# Patient Record
Sex: FEMALE | Race: WHITE | Employment: OTHER
[De-identification: names, ages, dates, MRNs, and addresses within clinical notes are randomized per-mention and may not be internally consistent; named-entity substitution may affect disease eponyms.]

---

## 2017-01-01 ENCOUNTER — HOME CARE VISIT (OUTPATIENT)
Dept: SCHEDULING | Facility: HOME HEALTH | Age: 82
End: 2017-01-01
Payer: MEDICARE

## 2017-01-01 ENCOUNTER — APPOINTMENT (OUTPATIENT)
Dept: GENERAL RADIOLOGY | Age: 82
DRG: 252 | End: 2017-01-01
Attending: EMERGENCY MEDICINE
Payer: MEDICARE

## 2017-01-01 ENCOUNTER — HOSPITAL ENCOUNTER (INPATIENT)
Age: 82
LOS: 6 days | Discharge: HOME | DRG: 480 | End: 2017-08-01
Attending: EMERGENCY MEDICINE | Admitting: HOSPITALIST
Payer: MEDICARE

## 2017-01-01 ENCOUNTER — HOME CARE VISIT (OUTPATIENT)
Dept: HOSPICE | Facility: HOSPICE | Age: 82
End: 2017-01-01
Payer: MEDICARE

## 2017-01-01 ENCOUNTER — APPOINTMENT (OUTPATIENT)
Dept: GENERAL RADIOLOGY | Age: 82
DRG: 480 | End: 2017-01-01
Attending: ORTHOPAEDIC SURGERY
Payer: MEDICARE

## 2017-01-01 ENCOUNTER — APPOINTMENT (OUTPATIENT)
Dept: CT IMAGING | Age: 82
DRG: 252 | End: 2017-01-01
Attending: INTERNAL MEDICINE
Payer: MEDICARE

## 2017-01-01 ENCOUNTER — HOSPITAL ENCOUNTER (INPATIENT)
Age: 82
LOS: 3 days | Discharge: NURSING, REHAB OR LONG-TERM CARE | DRG: 252 | End: 2017-04-21
Attending: EMERGENCY MEDICINE | Admitting: INTERNAL MEDICINE
Payer: MEDICARE

## 2017-01-01 ENCOUNTER — APPOINTMENT (OUTPATIENT)
Dept: GENERAL RADIOLOGY | Age: 82
DRG: 480 | End: 2017-01-01
Attending: PHYSICIAN ASSISTANT
Payer: MEDICARE

## 2017-01-01 ENCOUNTER — APPOINTMENT (OUTPATIENT)
Dept: GENERAL RADIOLOGY | Age: 82
DRG: 480 | End: 2017-01-01
Attending: EMERGENCY MEDICINE
Payer: MEDICARE

## 2017-01-01 ENCOUNTER — HOSPICE ADMISSION (OUTPATIENT)
Dept: HOSPICE | Facility: HOSPICE | Age: 82
End: 2017-01-01
Payer: MEDICARE

## 2017-01-01 ENCOUNTER — APPOINTMENT (OUTPATIENT)
Dept: INTERVENTIONAL RADIOLOGY/VASCULAR | Age: 82
DRG: 252 | End: 2017-01-01
Attending: HOSPITALIST
Payer: MEDICARE

## 2017-01-01 ENCOUNTER — APPOINTMENT (OUTPATIENT)
Dept: GENERAL RADIOLOGY | Age: 82
DRG: 252 | End: 2017-01-01
Attending: ORTHOPAEDIC SURGERY
Payer: MEDICARE

## 2017-01-01 ENCOUNTER — APPOINTMENT (OUTPATIENT)
Dept: CT IMAGING | Age: 82
DRG: 480 | End: 2017-01-01
Attending: PHYSICIAN ASSISTANT
Payer: MEDICARE

## 2017-01-01 ENCOUNTER — APPOINTMENT (OUTPATIENT)
Dept: CT IMAGING | Age: 82
End: 2017-01-01
Attending: EMERGENCY MEDICINE
Payer: MEDICARE

## 2017-01-01 ENCOUNTER — HOSPITAL ENCOUNTER (OUTPATIENT)
Age: 82
Setting detail: OBSERVATION
Discharge: HOME | End: 2017-01-20
Attending: EMERGENCY MEDICINE | Admitting: HOSPITALIST
Payer: MEDICARE

## 2017-01-01 ENCOUNTER — ANESTHESIA EVENT (OUTPATIENT)
Dept: SURGERY | Age: 82
DRG: 480 | End: 2017-01-01
Payer: MEDICARE

## 2017-01-01 ENCOUNTER — ANESTHESIA (OUTPATIENT)
Dept: SURGERY | Age: 82
DRG: 480 | End: 2017-01-01
Payer: MEDICARE

## 2017-01-01 VITALS — RESPIRATION RATE: 16 BRPM | DIASTOLIC BLOOD PRESSURE: 60 MMHG | SYSTOLIC BLOOD PRESSURE: 110 MMHG | HEART RATE: 76 BPM

## 2017-01-01 VITALS
SYSTOLIC BLOOD PRESSURE: 110 MMHG | HEART RATE: 86 BPM | DIASTOLIC BLOOD PRESSURE: 60 MMHG | OXYGEN SATURATION: 96 % | RESPIRATION RATE: 18 BRPM

## 2017-01-01 VITALS
DIASTOLIC BLOOD PRESSURE: 55 MMHG | TEMPERATURE: 98.2 F | RESPIRATION RATE: 20 BRPM | BODY MASS INDEX: 20.39 KG/M2 | OXYGEN SATURATION: 99 % | HEIGHT: 60 IN | WEIGHT: 103.84 LBS | SYSTOLIC BLOOD PRESSURE: 143 MMHG | HEART RATE: 99 BPM

## 2017-01-01 VITALS
OXYGEN SATURATION: 97 % | BODY MASS INDEX: 15.36 KG/M2 | RESPIRATION RATE: 18 BRPM | SYSTOLIC BLOOD PRESSURE: 90 MMHG | HEIGHT: 64 IN | HEART RATE: 88 BPM | DIASTOLIC BLOOD PRESSURE: 62 MMHG | WEIGHT: 90 LBS | TEMPERATURE: 98.9 F

## 2017-01-01 VITALS
OXYGEN SATURATION: 98 % | DIASTOLIC BLOOD PRESSURE: 80 MMHG | TEMPERATURE: 98 F | HEART RATE: 71 BPM | SYSTOLIC BLOOD PRESSURE: 148 MMHG | WEIGHT: 101 LBS | RESPIRATION RATE: 18 BRPM | BODY MASS INDEX: 19.83 KG/M2 | HEIGHT: 60 IN

## 2017-01-01 VITALS
RESPIRATION RATE: 16 BRPM | DIASTOLIC BLOOD PRESSURE: 66 MMHG | HEART RATE: 83 BPM | TEMPERATURE: 97.7 F | SYSTOLIC BLOOD PRESSURE: 117 MMHG

## 2017-01-01 VITALS
DIASTOLIC BLOOD PRESSURE: 58 MMHG | TEMPERATURE: 97.2 F | RESPIRATION RATE: 18 BRPM | SYSTOLIC BLOOD PRESSURE: 91 MMHG | HEART RATE: 106 BPM

## 2017-01-01 VITALS
RESPIRATION RATE: 17 BRPM | HEIGHT: 64 IN | BODY MASS INDEX: 15.36 KG/M2 | TEMPERATURE: 98.7 F | WEIGHT: 90 LBS | OXYGEN SATURATION: 98 % | SYSTOLIC BLOOD PRESSURE: 121 MMHG | HEART RATE: 87 BPM | DIASTOLIC BLOOD PRESSURE: 62 MMHG

## 2017-01-01 VITALS
SYSTOLIC BLOOD PRESSURE: 122 MMHG | OXYGEN SATURATION: 98 % | HEART RATE: 72 BPM | DIASTOLIC BLOOD PRESSURE: 64 MMHG | RESPIRATION RATE: 16 BRPM

## 2017-01-01 VITALS — RESPIRATION RATE: 16 BRPM | SYSTOLIC BLOOD PRESSURE: 106 MMHG | DIASTOLIC BLOOD PRESSURE: 60 MMHG | HEART RATE: 78 BPM

## 2017-01-01 VITALS
HEART RATE: 76 BPM | OXYGEN SATURATION: 99 % | RESPIRATION RATE: 18 BRPM | SYSTOLIC BLOOD PRESSURE: 102 MMHG | DIASTOLIC BLOOD PRESSURE: 64 MMHG

## 2017-01-01 VITALS
SYSTOLIC BLOOD PRESSURE: 120 MMHG | DIASTOLIC BLOOD PRESSURE: 80 MMHG | OXYGEN SATURATION: 97 % | HEART RATE: 79 BPM | RESPIRATION RATE: 18 BRPM

## 2017-01-01 VITALS — RESPIRATION RATE: 16 BRPM | SYSTOLIC BLOOD PRESSURE: 120 MMHG | HEART RATE: 78 BPM | DIASTOLIC BLOOD PRESSURE: 70 MMHG

## 2017-01-01 VITALS
RESPIRATION RATE: 16 BRPM | OXYGEN SATURATION: 97 % | SYSTOLIC BLOOD PRESSURE: 140 MMHG | HEART RATE: 86 BPM | DIASTOLIC BLOOD PRESSURE: 80 MMHG

## 2017-01-01 VITALS — SYSTOLIC BLOOD PRESSURE: 120 MMHG | HEART RATE: 76 BPM | RESPIRATION RATE: 16 BRPM | DIASTOLIC BLOOD PRESSURE: 80 MMHG

## 2017-01-01 VITALS — HEART RATE: 74 BPM | SYSTOLIC BLOOD PRESSURE: 80 MMHG | RESPIRATION RATE: 16 BRPM | DIASTOLIC BLOOD PRESSURE: 50 MMHG

## 2017-01-01 VITALS
HEART RATE: 70 BPM | DIASTOLIC BLOOD PRESSURE: 61 MMHG | TEMPERATURE: 97.3 F | SYSTOLIC BLOOD PRESSURE: 93 MMHG | RESPIRATION RATE: 18 BRPM

## 2017-01-01 VITALS — DIASTOLIC BLOOD PRESSURE: 68 MMHG | HEART RATE: 76 BPM | SYSTOLIC BLOOD PRESSURE: 106 MMHG | RESPIRATION RATE: 16 BRPM

## 2017-01-01 VITALS — RESPIRATION RATE: 18 BRPM | DIASTOLIC BLOOD PRESSURE: 70 MMHG | SYSTOLIC BLOOD PRESSURE: 120 MMHG | HEART RATE: 78 BPM

## 2017-01-01 VITALS — DIASTOLIC BLOOD PRESSURE: 60 MMHG | SYSTOLIC BLOOD PRESSURE: 110 MMHG | HEART RATE: 74 BPM | RESPIRATION RATE: 18 BRPM

## 2017-01-01 VITALS
DIASTOLIC BLOOD PRESSURE: 78 MMHG | SYSTOLIC BLOOD PRESSURE: 120 MMHG | OXYGEN SATURATION: 98 % | RESPIRATION RATE: 18 BRPM | HEART RATE: 64 BPM

## 2017-01-01 VITALS — HEART RATE: 86 BPM | SYSTOLIC BLOOD PRESSURE: 120 MMHG | DIASTOLIC BLOOD PRESSURE: 80 MMHG | RESPIRATION RATE: 16 BRPM

## 2017-01-01 DIAGNOSIS — Z71.89 GOALS OF CARE, COUNSELING/DISCUSSION: ICD-10-CM

## 2017-01-01 DIAGNOSIS — F02.80 ALZHEIMER'S DEMENTIA WITHOUT BEHAVIORAL DISTURBANCE, UNSPECIFIED TIMING OF DEMENTIA ONSET: ICD-10-CM

## 2017-01-01 DIAGNOSIS — E86.0 DEHYDRATION: Primary | ICD-10-CM

## 2017-01-01 DIAGNOSIS — R55 SYNCOPE AND COLLAPSE: ICD-10-CM

## 2017-01-01 DIAGNOSIS — R53.81 DEBILITY: ICD-10-CM

## 2017-01-01 DIAGNOSIS — Z71.89 DNR (DO NOT RESUSCITATE) DISCUSSION: ICD-10-CM

## 2017-01-01 DIAGNOSIS — R19.7 DIARRHEA, UNSPECIFIED TYPE: ICD-10-CM

## 2017-01-01 DIAGNOSIS — G30.9 ALZHEIMER'S DEMENTIA WITHOUT BEHAVIORAL DISTURBANCE, UNSPECIFIED TIMING OF DEMENTIA ONSET: ICD-10-CM

## 2017-01-01 DIAGNOSIS — R53.1 WEAKNESS: ICD-10-CM

## 2017-01-01 DIAGNOSIS — S72.332A CLOSED DISPLACED OBLIQUE FRACTURE OF SHAFT OF LEFT FEMUR, INITIAL ENCOUNTER (HCC): Primary | ICD-10-CM

## 2017-01-01 DIAGNOSIS — S72.092A OTHER CLOSED FRACTURE OF HEAD OR NECK OF LEFT FEMUR, INITIAL ENCOUNTER (HCC): Primary | ICD-10-CM

## 2017-01-01 DIAGNOSIS — R11.10 VOMITING, INTRACTABILITY OF VOMITING NOT SPECIFIED, PRESENCE OF NAUSEA NOT SPECIFIED, UNSPECIFIED VOMITING TYPE: ICD-10-CM

## 2017-01-01 DIAGNOSIS — M79.605 LEFT LEG PAIN: ICD-10-CM

## 2017-01-01 DIAGNOSIS — N39.0 URINARY TRACT INFECTION WITHOUT HEMATURIA, SITE UNSPECIFIED: ICD-10-CM

## 2017-01-01 LAB
ABO + RH BLD: NORMAL
ALBUMIN SERPL BCP-MCNC: 1.9 G/DL (ref 3.5–5)
ALBUMIN SERPL BCP-MCNC: 1.9 G/DL (ref 3.5–5)
ALBUMIN SERPL BCP-MCNC: 2.3 G/DL (ref 3.5–5)
ALBUMIN SERPL BCP-MCNC: 3.2 G/DL (ref 3.5–5)
ALBUMIN SERPL BCP-MCNC: 4 G/DL (ref 3.5–5)
ALBUMIN/GLOB SERPL: 0.6 {RATIO} (ref 1.1–2.2)
ALBUMIN/GLOB SERPL: 0.6 {RATIO} (ref 1.1–2.2)
ALBUMIN/GLOB SERPL: 0.7 {RATIO} (ref 1.1–2.2)
ALBUMIN/GLOB SERPL: 0.8 {RATIO} (ref 1.1–2.2)
ALBUMIN/GLOB SERPL: 1.1 {RATIO} (ref 1.1–2.2)
ALP SERPL-CCNC: 111 U/L (ref 45–117)
ALP SERPL-CCNC: 136 U/L (ref 45–117)
ALP SERPL-CCNC: 172 U/L (ref 45–117)
ALP SERPL-CCNC: 189 U/L (ref 45–117)
ALP SERPL-CCNC: 93 U/L (ref 45–117)
ALT SERPL-CCNC: 24 U/L (ref 12–78)
ALT SERPL-CCNC: 25 U/L (ref 12–78)
ALT SERPL-CCNC: 26 U/L (ref 12–78)
ALT SERPL-CCNC: 30 U/L (ref 12–78)
ALT SERPL-CCNC: 37 U/L (ref 12–78)
AMORPH CRY URNS QL MICRO: ABNORMAL
AMORPH CRY URNS QL MICRO: ABNORMAL
ANION GAP BLD CALC-SCNC: 10 MMOL/L (ref 5–15)
ANION GAP BLD CALC-SCNC: 18 MMOL/L (ref 5–15)
ANION GAP BLD CALC-SCNC: 2 MMOL/L (ref 5–15)
ANION GAP BLD CALC-SCNC: 4 MMOL/L (ref 5–15)
ANION GAP BLD CALC-SCNC: 5 MMOL/L (ref 5–15)
ANION GAP BLD CALC-SCNC: 6 MMOL/L (ref 5–15)
ANION GAP BLD CALC-SCNC: 6 MMOL/L (ref 5–15)
ANION GAP BLD CALC-SCNC: 7 MMOL/L (ref 5–15)
ANION GAP BLD CALC-SCNC: 8 MMOL/L (ref 5–15)
ANION GAP BLD CALC-SCNC: 8 MMOL/L (ref 5–15)
APPEARANCE UR: ABNORMAL
AST SERPL W P-5'-P-CCNC: 24 U/L (ref 15–37)
AST SERPL W P-5'-P-CCNC: 24 U/L (ref 15–37)
AST SERPL W P-5'-P-CCNC: 26 U/L (ref 15–37)
AST SERPL W P-5'-P-CCNC: 29 U/L (ref 15–37)
AST SERPL W P-5'-P-CCNC: 51 U/L (ref 15–37)
ATRIAL RATE: 91 BPM
BACTERIA SPEC CULT: ABNORMAL
BACTERIA SPEC CULT: NORMAL
BACTERIA URNS QL MICRO: ABNORMAL /HPF
BACTERIA URNS QL MICRO: ABNORMAL /HPF
BACTERIA URNS QL MICRO: NEGATIVE /HPF
BASOPHILS # BLD AUTO: 0 K/UL (ref 0–0.1)
BASOPHILS # BLD: 0 % (ref 0–1)
BILIRUB SERPL-MCNC: 0.5 MG/DL (ref 0.2–1)
BILIRUB SERPL-MCNC: 0.6 MG/DL (ref 0.2–1)
BILIRUB SERPL-MCNC: 0.6 MG/DL (ref 0.2–1)
BILIRUB SERPL-MCNC: 0.7 MG/DL (ref 0.2–1)
BILIRUB SERPL-MCNC: 0.7 MG/DL (ref 0.2–1)
BILIRUB UR QL CFM: NEGATIVE
BILIRUB UR QL CFM: NEGATIVE
BILIRUB UR QL: NEGATIVE
BLD PROD TYP BPU: NORMAL
BLD PROD TYP BPU: NORMAL
BLOOD GROUP ANTIBODIES SERPL: NORMAL
BPU ID: NORMAL
BPU ID: NORMAL
BUN BLD-MCNC: 48 MG/DL (ref 9–20)
BUN SERPL-MCNC: 14 MG/DL (ref 6–20)
BUN SERPL-MCNC: 17 MG/DL (ref 6–20)
BUN SERPL-MCNC: 17 MG/DL (ref 6–20)
BUN SERPL-MCNC: 23 MG/DL (ref 6–20)
BUN SERPL-MCNC: 25 MG/DL (ref 6–20)
BUN SERPL-MCNC: 29 MG/DL (ref 6–20)
BUN SERPL-MCNC: 36 MG/DL (ref 6–20)
BUN SERPL-MCNC: 41 MG/DL (ref 6–20)
BUN SERPL-MCNC: 41 MG/DL (ref 6–20)
BUN SERPL-MCNC: 46 MG/DL (ref 6–20)
BUN SERPL-MCNC: 59 MG/DL (ref 6–20)
BUN SERPL-MCNC: 9 MG/DL (ref 6–20)
BUN/CREAT SERPL: 20 (ref 12–20)
BUN/CREAT SERPL: 36 (ref 12–20)
BUN/CREAT SERPL: 41 (ref 12–20)
BUN/CREAT SERPL: 44 (ref 12–20)
BUN/CREAT SERPL: 44 (ref 12–20)
BUN/CREAT SERPL: 47 (ref 12–20)
BUN/CREAT SERPL: 62 (ref 12–20)
BUN/CREAT SERPL: 67 (ref 12–20)
BUN/CREAT SERPL: 68 (ref 12–20)
BUN/CREAT SERPL: 81 (ref 12–20)
BUN/CREAT SERPL: 85 (ref 12–20)
BUN/CREAT SERPL: 98 (ref 12–20)
CA-I BLD-MCNC: 1.16 MMOL/L (ref 1.12–1.32)
CALCIUM SERPL-MCNC: 7.7 MG/DL (ref 8.5–10.1)
CALCIUM SERPL-MCNC: 7.8 MG/DL (ref 8.5–10.1)
CALCIUM SERPL-MCNC: 7.9 MG/DL (ref 8.5–10.1)
CALCIUM SERPL-MCNC: 8 MG/DL (ref 8.5–10.1)
CALCIUM SERPL-MCNC: 8.1 MG/DL (ref 8.5–10.1)
CALCIUM SERPL-MCNC: 8.3 MG/DL (ref 8.5–10.1)
CALCIUM SERPL-MCNC: 8.6 MG/DL (ref 8.5–10.1)
CALCIUM SERPL-MCNC: 8.8 MG/DL (ref 8.5–10.1)
CALCIUM SERPL-MCNC: 9.5 MG/DL (ref 8.5–10.1)
CALCULATED P AXIS, ECG09: -6 DEGREES
CALCULATED R AXIS, ECG10: -13 DEGREES
CALCULATED T AXIS, ECG11: 14 DEGREES
CC UR VC: ABNORMAL
CC UR VC: NORMAL
CHLORIDE BLD-SCNC: 113 MMOL/L (ref 98–107)
CHLORIDE SERPL-SCNC: 100 MMOL/L (ref 97–108)
CHLORIDE SERPL-SCNC: 106 MMOL/L (ref 97–108)
CHLORIDE SERPL-SCNC: 108 MMOL/L (ref 97–108)
CHLORIDE SERPL-SCNC: 108 MMOL/L (ref 97–108)
CHLORIDE SERPL-SCNC: 110 MMOL/L (ref 97–108)
CHLORIDE SERPL-SCNC: 111 MMOL/L (ref 97–108)
CHLORIDE SERPL-SCNC: 111 MMOL/L (ref 97–108)
CHLORIDE SERPL-SCNC: 112 MMOL/L (ref 97–108)
CHLORIDE SERPL-SCNC: 114 MMOL/L (ref 97–108)
CHLORIDE SERPL-SCNC: 119 MMOL/L (ref 97–108)
CHLORIDE SERPL-SCNC: 119 MMOL/L (ref 97–108)
CHLORIDE SERPL-SCNC: 121 MMOL/L (ref 97–108)
CK MB CFR SERPL CALC: 1.6 % (ref 0–2.5)
CK MB CFR SERPL CALC: 1.9 % (ref 0–2.5)
CK MB SERPL-MCNC: 4 NG/ML (ref 5–25)
CK MB SERPL-MCNC: 6.4 NG/ML (ref 5–25)
CK SERPL-CCNC: 211 U/L (ref 26–192)
CK SERPL-CCNC: 392 U/L (ref 26–192)
CO2 BLD-SCNC: 25 MMOL/L (ref 21–32)
CO2 SERPL-SCNC: 26 MMOL/L (ref 21–32)
CO2 SERPL-SCNC: 27 MMOL/L (ref 21–32)
CO2 SERPL-SCNC: 27 MMOL/L (ref 21–32)
CO2 SERPL-SCNC: 28 MMOL/L (ref 21–32)
CO2 SERPL-SCNC: 30 MMOL/L (ref 21–32)
CO2 SERPL-SCNC: 32 MMOL/L (ref 21–32)
CO2 SERPL-SCNC: 35 MMOL/L (ref 21–32)
COLOR UR: ABNORMAL
CREAT BLD-MCNC: 0.7 MG/DL (ref 0.6–1.3)
CREAT SERPL-MCNC: 0.32 MG/DL (ref 0.55–1.02)
CREAT SERPL-MCNC: 0.36 MG/DL (ref 0.55–1.02)
CREAT SERPL-MCNC: 0.37 MG/DL (ref 0.55–1.02)
CREAT SERPL-MCNC: 0.43 MG/DL (ref 0.55–1.02)
CREAT SERPL-MCNC: 0.44 MG/DL (ref 0.55–1.02)
CREAT SERPL-MCNC: 0.47 MG/DL (ref 0.55–1.02)
CREAT SERPL-MCNC: 0.47 MG/DL (ref 0.55–1.02)
CREAT SERPL-MCNC: 0.48 MG/DL (ref 0.55–1.02)
CREAT SERPL-MCNC: 0.52 MG/DL (ref 0.55–1.02)
CREAT SERPL-MCNC: 0.58 MG/DL (ref 0.55–1.02)
CREAT SERPL-MCNC: 0.73 MG/DL (ref 0.55–1.02)
CREAT SERPL-MCNC: 0.99 MG/DL (ref 0.55–1.02)
CROSSMATCH RESULT,%XM: NORMAL
CROSSMATCH RESULT,%XM: NORMAL
DIAGNOSIS, 93000: NORMAL
DIFFERENTIAL METHOD BLD: ABNORMAL
EOSINOPHIL # BLD: 0 K/UL (ref 0–0.4)
EOSINOPHIL # BLD: 0.1 K/UL (ref 0–0.4)
EOSINOPHIL NFR BLD: 0 % (ref 0–7)
EOSINOPHIL NFR BLD: 1 % (ref 0–7)
EOSINOPHIL NFR BLD: 2 % (ref 0–7)
EOSINOPHIL NFR BLD: 2 % (ref 0–7)
EPITH CASTS URNS QL MICRO: ABNORMAL /LPF
ERYTHROCYTE [DISTWIDTH] IN BLOOD BY AUTOMATED COUNT: 14.8 % (ref 11.5–14.5)
ERYTHROCYTE [DISTWIDTH] IN BLOOD BY AUTOMATED COUNT: 14.9 % (ref 11.5–14.5)
ERYTHROCYTE [DISTWIDTH] IN BLOOD BY AUTOMATED COUNT: 15.9 % (ref 11.5–14.5)
ERYTHROCYTE [DISTWIDTH] IN BLOOD BY AUTOMATED COUNT: 16 % (ref 11.5–14.5)
ERYTHROCYTE [DISTWIDTH] IN BLOOD BY AUTOMATED COUNT: 16.5 % (ref 11.5–14.5)
ERYTHROCYTE [DISTWIDTH] IN BLOOD BY AUTOMATED COUNT: 16.5 % (ref 11.5–14.5)
ERYTHROCYTE [DISTWIDTH] IN BLOOD BY AUTOMATED COUNT: 17.5 % (ref 11.5–14.5)
ERYTHROCYTE [DISTWIDTH] IN BLOOD BY AUTOMATED COUNT: 17.6 % (ref 11.5–14.5)
ERYTHROCYTE [DISTWIDTH] IN BLOOD BY AUTOMATED COUNT: 18.5 % (ref 11.5–14.5)
ERYTHROCYTE [DISTWIDTH] IN BLOOD BY AUTOMATED COUNT: 19.6 % (ref 11.5–14.5)
FERRITIN SERPL-MCNC: 178 NG/ML (ref 8–252)
FOLATE SERPL-MCNC: 11.8 NG/ML (ref 5–21)
GLOBULIN SER CALC-MCNC: 2.7 G/DL (ref 2–4)
GLOBULIN SER CALC-MCNC: 3.3 G/DL (ref 2–4)
GLOBULIN SER CALC-MCNC: 3.8 G/DL (ref 2–4)
GLOBULIN SER CALC-MCNC: 3.9 G/DL (ref 2–4)
GLOBULIN SER CALC-MCNC: 4.1 G/DL (ref 2–4)
GLUCOSE BLD-MCNC: 121 MG/DL (ref 65–100)
GLUCOSE SERPL-MCNC: 104 MG/DL (ref 65–100)
GLUCOSE SERPL-MCNC: 117 MG/DL (ref 65–100)
GLUCOSE SERPL-MCNC: 123 MG/DL (ref 65–100)
GLUCOSE SERPL-MCNC: 125 MG/DL (ref 65–100)
GLUCOSE SERPL-MCNC: 144 MG/DL (ref 65–100)
GLUCOSE SERPL-MCNC: 74 MG/DL (ref 65–100)
GLUCOSE SERPL-MCNC: 82 MG/DL (ref 65–100)
GLUCOSE SERPL-MCNC: 88 MG/DL (ref 65–100)
GLUCOSE SERPL-MCNC: 88 MG/DL (ref 65–100)
GLUCOSE SERPL-MCNC: 91 MG/DL (ref 65–100)
GLUCOSE SERPL-MCNC: 95 MG/DL (ref 65–100)
GLUCOSE SERPL-MCNC: 96 MG/DL (ref 65–100)
GLUCOSE UR STRIP.AUTO-MCNC: NEGATIVE MG/DL
GRAN CASTS URNS QL MICRO: ABNORMAL /LPF
HCT VFR BLD AUTO: 21.5 % (ref 35–47)
HCT VFR BLD AUTO: 21.7 % (ref 35–47)
HCT VFR BLD AUTO: 28.8 % (ref 35–47)
HCT VFR BLD AUTO: 28.9 % (ref 35–47)
HCT VFR BLD AUTO: 29.9 % (ref 35–47)
HCT VFR BLD AUTO: 30.8 % (ref 35–47)
HCT VFR BLD AUTO: 32.8 % (ref 35–47)
HCT VFR BLD AUTO: 34.4 % (ref 35–47)
HCT VFR BLD AUTO: 39.3 % (ref 35–47)
HCT VFR BLD AUTO: 41.5 % (ref 35–47)
HCT VFR BLD AUTO: 43.9 % (ref 35–47)
HCT VFR BLD CALC: 23 % (ref 35–47)
HEMOCCULT STL QL: NEGATIVE
HGB BLD-MCNC: 10.5 G/DL (ref 11.5–16)
HGB BLD-MCNC: 11.1 G/DL (ref 11.5–16)
HGB BLD-MCNC: 12.3 G/DL (ref 11.5–16)
HGB BLD-MCNC: 13.3 G/DL (ref 11.5–16)
HGB BLD-MCNC: 14.3 G/DL (ref 11.5–16)
HGB BLD-MCNC: 6.6 G/DL (ref 11.5–16)
HGB BLD-MCNC: 6.7 G/DL (ref 11.5–16)
HGB BLD-MCNC: 7.8 GM/DL (ref 11.5–16)
HGB BLD-MCNC: 9 G/DL (ref 11.5–16)
HGB BLD-MCNC: 9.5 G/DL (ref 11.5–16)
HGB BLD-MCNC: 9.6 G/DL (ref 11.5–16)
HGB BLD-MCNC: 9.7 G/DL (ref 11.5–16)
HGB BLD-MCNC: 9.9 G/DL (ref 11.5–16)
HGB UR QL STRIP: ABNORMAL
HGB UR QL STRIP: ABNORMAL
HGB UR QL STRIP: NEGATIVE
HYALINE CASTS URNS QL MICRO: ABNORMAL /LPF (ref 0–5)
INR PPP: 1 (ref 0.9–1.1)
IRON SATN MFR SERPL: 17 % (ref 20–50)
IRON SERPL-MCNC: 34 UG/DL (ref 35–150)
IRON SERPL-MCNC: 35 UG/DL (ref 35–150)
KETONES UR QL STRIP.AUTO: ABNORMAL MG/DL
KETONES UR QL STRIP.AUTO: NEGATIVE MG/DL
KETONES UR QL STRIP.AUTO: NEGATIVE MG/DL
LEUKOCYTE ESTERASE UR QL STRIP.AUTO: ABNORMAL
LEUKOCYTE ESTERASE UR QL STRIP.AUTO: NEGATIVE
LEUKOCYTE ESTERASE UR QL STRIP.AUTO: NEGATIVE
LIPASE SERPL-CCNC: 184 U/L (ref 73–393)
LYMPHOCYTES # BLD AUTO: 1 % (ref 12–49)
LYMPHOCYTES # BLD AUTO: 11 % (ref 12–49)
LYMPHOCYTES # BLD AUTO: 17 % (ref 12–49)
LYMPHOCYTES # BLD AUTO: 20 % (ref 12–49)
LYMPHOCYTES # BLD AUTO: 25 % (ref 12–49)
LYMPHOCYTES # BLD AUTO: 33 % (ref 12–49)
LYMPHOCYTES # BLD: 0.1 K/UL (ref 0.8–3.5)
LYMPHOCYTES # BLD: 1.1 K/UL (ref 0.8–3.5)
LYMPHOCYTES # BLD: 1.3 K/UL (ref 0.8–3.5)
LYMPHOCYTES # BLD: 1.5 K/UL (ref 0.8–3.5)
LYMPHOCYTES # BLD: 1.6 K/UL (ref 0.8–3.5)
LYMPHOCYTES # BLD: 2 K/UL (ref 0.8–3.5)
LYMPHOCYTES # BLD: 2.2 K/UL (ref 0.8–3.5)
LYMPHOCYTES # BLD: 2.9 K/UL (ref 0.8–3.5)
MAGNESIUM SERPL-MCNC: 2.2 MG/DL (ref 1.6–2.4)
MAGNESIUM SERPL-MCNC: 2.6 MG/DL (ref 1.6–2.4)
MCH RBC QN AUTO: 29.2 PG (ref 26–34)
MCH RBC QN AUTO: 29.5 PG (ref 26–34)
MCH RBC QN AUTO: 29.7 PG (ref 26–34)
MCH RBC QN AUTO: 30.3 PG (ref 26–34)
MCH RBC QN AUTO: 30.5 PG (ref 26–34)
MCH RBC QN AUTO: 30.7 PG (ref 26–34)
MCH RBC QN AUTO: 30.8 PG (ref 26–34)
MCH RBC QN AUTO: 30.9 PG (ref 26–34)
MCH RBC QN AUTO: 31.2 PG (ref 26–34)
MCH RBC QN AUTO: 31.2 PG (ref 26–34)
MCHC RBC AUTO-ENTMCNC: 31.2 G/DL (ref 30–36.5)
MCHC RBC AUTO-ENTMCNC: 31.3 G/DL (ref 30–36.5)
MCHC RBC AUTO-ENTMCNC: 31.3 G/DL (ref 30–36.5)
MCHC RBC AUTO-ENTMCNC: 31.5 G/DL (ref 30–36.5)
MCHC RBC AUTO-ENTMCNC: 31.8 G/DL (ref 30–36.5)
MCHC RBC AUTO-ENTMCNC: 32 G/DL (ref 30–36.5)
MCHC RBC AUTO-ENTMCNC: 32 G/DL (ref 30–36.5)
MCHC RBC AUTO-ENTMCNC: 32.3 G/DL (ref 30–36.5)
MCHC RBC AUTO-ENTMCNC: 32.6 G/DL (ref 30–36.5)
MCHC RBC AUTO-ENTMCNC: 33.2 G/DL (ref 30–36.5)
MCV RBC AUTO: 92.6 FL (ref 80–99)
MCV RBC AUTO: 92.8 FL (ref 80–99)
MCV RBC AUTO: 92.9 FL (ref 80–99)
MCV RBC AUTO: 94.8 FL (ref 80–99)
MCV RBC AUTO: 94.8 FL (ref 80–99)
MCV RBC AUTO: 94.9 FL (ref 80–99)
MCV RBC AUTO: 96.6 FL (ref 80–99)
MCV RBC AUTO: 97.4 FL (ref 80–99)
MCV RBC AUTO: 97.7 FL (ref 80–99)
MCV RBC AUTO: 98.3 FL (ref 80–99)
MONOCYTES # BLD: 0.1 K/UL (ref 0–1)
MONOCYTES # BLD: 0.4 K/UL (ref 0–1)
MONOCYTES # BLD: 0.5 K/UL (ref 0–1)
MONOCYTES # BLD: 0.5 K/UL (ref 0–1)
MONOCYTES # BLD: 0.6 K/UL (ref 0–1)
MONOCYTES # BLD: 0.6 K/UL (ref 0–1)
MONOCYTES # BLD: 0.7 K/UL (ref 0–1)
MONOCYTES # BLD: 0.7 K/UL (ref 0–1)
MONOCYTES NFR BLD AUTO: 1 % (ref 5–13)
MONOCYTES NFR BLD AUTO: 5 % (ref 5–13)
MONOCYTES NFR BLD AUTO: 6 % (ref 5–13)
MONOCYTES NFR BLD AUTO: 8 % (ref 5–13)
MONOCYTES NFR BLD AUTO: 9 % (ref 5–13)
MUCOUS THREADS URNS QL MICRO: ABNORMAL /LPF
NEUTS BAND NFR BLD MANUAL: 9 % (ref 0–6)
NEUTS SEG # BLD: 12.5 K/UL (ref 1.8–8)
NEUTS SEG # BLD: 4 K/UL (ref 1.8–8)
NEUTS SEG # BLD: 5.4 K/UL (ref 1.8–8)
NEUTS SEG # BLD: 5.8 K/UL (ref 1.8–8)
NEUTS SEG # BLD: 5.9 K/UL (ref 1.8–8)
NEUTS SEG # BLD: 6.8 K/UL (ref 1.8–8)
NEUTS SEG # BLD: 7.6 K/UL (ref 1.8–8)
NEUTS SEG # BLD: 9.4 K/UL (ref 1.8–8)
NEUTS SEG NFR BLD AUTO: 61 % (ref 32–75)
NEUTS SEG NFR BLD AUTO: 67 % (ref 32–75)
NEUTS SEG NFR BLD AUTO: 69 % (ref 32–75)
NEUTS SEG NFR BLD AUTO: 71 % (ref 32–75)
NEUTS SEG NFR BLD AUTO: 73 % (ref 32–75)
NEUTS SEG NFR BLD AUTO: 77 % (ref 32–75)
NEUTS SEG NFR BLD AUTO: 83 % (ref 32–75)
NEUTS SEG NFR BLD AUTO: 89 % (ref 32–75)
NITRITE UR QL STRIP.AUTO: NEGATIVE
NITRITE UR QL STRIP.AUTO: NEGATIVE
NITRITE UR QL STRIP.AUTO: POSITIVE
NRBC # BLD: 0 K/UL (ref 0–0.01)
NRBC BLD-RTO: 0 PER 100 WBC
P-R INTERVAL, ECG05: 162 MS
PH UR STRIP: 5 [PH] (ref 5–8)
PH UR STRIP: 5 [PH] (ref 5–8)
PH UR STRIP: 5.5 [PH] (ref 5–8)
PHOSPHATE SERPL-MCNC: 2.4 MG/DL (ref 2.6–4.7)
PHOSPHATE SERPL-MCNC: 2.9 MG/DL (ref 2.6–4.7)
PLATELET # BLD AUTO: 143 K/UL (ref 150–400)
PLATELET # BLD AUTO: 155 K/UL (ref 150–400)
PLATELET # BLD AUTO: 170 K/UL (ref 150–400)
PLATELET # BLD AUTO: 187 K/UL (ref 150–400)
PLATELET # BLD AUTO: 207 K/UL (ref 150–400)
PLATELET # BLD AUTO: 244 K/UL (ref 150–400)
PLATELET # BLD AUTO: 419 K/UL (ref 150–400)
PLATELET # BLD AUTO: 426 K/UL (ref 150–400)
PLATELET # BLD AUTO: 438 K/UL (ref 150–400)
PLATELET # BLD AUTO: 545 K/UL (ref 150–400)
POTASSIUM BLD-SCNC: 3.9 MMOL/L (ref 3.5–5.1)
POTASSIUM SERPL-SCNC: 2.9 MMOL/L (ref 3.5–5.1)
POTASSIUM SERPL-SCNC: 3.1 MMOL/L (ref 3.5–5.1)
POTASSIUM SERPL-SCNC: 3.4 MMOL/L (ref 3.5–5.1)
POTASSIUM SERPL-SCNC: 3.5 MMOL/L (ref 3.5–5.1)
POTASSIUM SERPL-SCNC: 3.6 MMOL/L (ref 3.5–5.1)
POTASSIUM SERPL-SCNC: 3.6 MMOL/L (ref 3.5–5.1)
POTASSIUM SERPL-SCNC: 3.7 MMOL/L (ref 3.5–5.1)
POTASSIUM SERPL-SCNC: 3.7 MMOL/L (ref 3.5–5.1)
POTASSIUM SERPL-SCNC: 3.8 MMOL/L (ref 3.5–5.1)
POTASSIUM SERPL-SCNC: 3.9 MMOL/L (ref 3.5–5.1)
POTASSIUM SERPL-SCNC: 3.9 MMOL/L (ref 3.5–5.1)
POTASSIUM SERPL-SCNC: 4.2 MMOL/L (ref 3.5–5.1)
PROT SERPL-MCNC: 4.6 G/DL (ref 6.4–8.2)
PROT SERPL-MCNC: 5.2 G/DL (ref 6.4–8.2)
PROT SERPL-MCNC: 6.4 G/DL (ref 6.4–8.2)
PROT SERPL-MCNC: 7.1 G/DL (ref 6.4–8.2)
PROT SERPL-MCNC: 7.8 G/DL (ref 6.4–8.2)
PROT UR STRIP-MCNC: 100 MG/DL
PROT UR STRIP-MCNC: 30 MG/DL
PROT UR STRIP-MCNC: ABNORMAL MG/DL
PROTHROMBIN TIME: 10.2 SEC (ref 9–11.1)
Q-T INTERVAL, ECG07: 332 MS
QRS DURATION, ECG06: 72 MS
QTC CALCULATION (BEZET), ECG08: 408 MS
RBC # BLD AUTO: 2.2 M/UL (ref 3.8–5.2)
RBC # BLD AUTO: 2.93 M/UL (ref 3.8–5.2)
RBC # BLD AUTO: 3.12 M/UL (ref 3.8–5.2)
RBC # BLD AUTO: 3.22 M/UL (ref 3.8–5.2)
RBC # BLD AUTO: 3.32 M/UL (ref 3.8–5.2)
RBC # BLD AUTO: 3.46 M/UL (ref 3.8–5.2)
RBC # BLD AUTO: 3.56 M/UL (ref 3.8–5.2)
RBC # BLD AUTO: 4.14 M/UL (ref 3.8–5.2)
RBC # BLD AUTO: 4.26 M/UL (ref 3.8–5.2)
RBC # BLD AUTO: 4.63 M/UL (ref 3.8–5.2)
RBC #/AREA URNS HPF: ABNORMAL /HPF (ref 0–5)
RBC MORPH BLD: ABNORMAL
SERVICE CMNT-IMP: ABNORMAL
SERVICE CMNT-IMP: ABNORMAL
SERVICE CMNT-IMP: NORMAL
SODIUM BLD-SCNC: 151 MMOL/L (ref 136–145)
SODIUM SERPL-SCNC: 137 MMOL/L (ref 136–145)
SODIUM SERPL-SCNC: 141 MMOL/L (ref 136–145)
SODIUM SERPL-SCNC: 142 MMOL/L (ref 136–145)
SODIUM SERPL-SCNC: 143 MMOL/L (ref 136–145)
SODIUM SERPL-SCNC: 144 MMOL/L (ref 136–145)
SODIUM SERPL-SCNC: 145 MMOL/L (ref 136–145)
SODIUM SERPL-SCNC: 146 MMOL/L (ref 136–145)
SODIUM SERPL-SCNC: 148 MMOL/L (ref 136–145)
SODIUM SERPL-SCNC: 148 MMOL/L (ref 136–145)
SODIUM SERPL-SCNC: 152 MMOL/L (ref 136–145)
SODIUM SERPL-SCNC: 152 MMOL/L (ref 136–145)
SODIUM SERPL-SCNC: 153 MMOL/L (ref 136–145)
SP GR UR REFRACTOMETRY: 1.03 (ref 1–1.03)
SP GR UR REFRACTOMETRY: 1.03 (ref 1–1.03)
SP GR UR REFRACTOMETRY: >1.03 (ref 1–1.03)
SPECIMEN EXP DATE BLD: NORMAL
STATUS OF UNIT,%ST: NORMAL
STATUS OF UNIT,%ST: NORMAL
TIBC SERPL-MCNC: 205 UG/DL (ref 250–450)
TROPONIN I SERPL-MCNC: 0.07 NG/ML
TROPONIN I SERPL-MCNC: <0.04 NG/ML
TSH SERPL DL<=0.05 MIU/L-ACNC: 1.42 UIU/ML (ref 0.36–3.74)
UA: UC IF INDICATED,UAUC: ABNORMAL
UNIT DIVISION, %UDIV: 0
UNIT DIVISION, %UDIV: 0
UROBILINOGEN UR QL STRIP.AUTO: 0.2 EU/DL (ref 0.2–1)
UROBILINOGEN UR QL STRIP.AUTO: 0.2 EU/DL (ref 0.2–1)
UROBILINOGEN UR QL STRIP.AUTO: 1 EU/DL (ref 0.2–1)
VENTRICULAR RATE, ECG03: 91 BPM
VIT B12 SERPL-MCNC: 248 PG/ML (ref 211–911)
WBC # BLD AUTO: 10.3 K/UL (ref 3.6–11)
WBC # BLD AUTO: 11.2 K/UL (ref 3.6–11)
WBC # BLD AUTO: 11.4 K/UL (ref 3.6–11)
WBC # BLD AUTO: 12.7 K/UL (ref 3.6–11)
WBC # BLD AUTO: 5.7 K/UL (ref 3.6–11)
WBC # BLD AUTO: 7.2 K/UL (ref 3.6–11)
WBC # BLD AUTO: 8.2 K/UL (ref 3.6–11)
WBC # BLD AUTO: 8.7 K/UL (ref 3.6–11)
WBC # BLD AUTO: 8.8 K/UL (ref 3.6–11)
WBC # BLD AUTO: 8.9 K/UL (ref 3.6–11)
WBC URNS QL MICRO: ABNORMAL /HPF (ref 0–4)

## 2017-01-01 PROCEDURE — HOSPICE MEDICATION HC HH HOSPICE MEDICATION

## 2017-01-01 PROCEDURE — 65270000029 HC RM PRIVATE

## 2017-01-01 PROCEDURE — 82553 CREATINE MB FRACTION: CPT | Performed by: EMERGENCY MEDICINE

## 2017-01-01 PROCEDURE — 74011250636 HC RX REV CODE- 250/636: Performed by: EMERGENCY MEDICINE

## 2017-01-01 PROCEDURE — 0651 HSPC ROUTINE HOME CARE

## 2017-01-01 PROCEDURE — A6250 SKIN SEAL PROTECT MOISTURIZR: HCPCS

## 2017-01-01 PROCEDURE — 96374 THER/PROPH/DIAG INJ IV PUSH: CPT

## 2017-01-01 PROCEDURE — 97166 OT EVAL MOD COMPLEX 45 MIN: CPT

## 2017-01-01 PROCEDURE — G0299 HHS/HOSPICE OF RN EA 15 MIN: HCPCS

## 2017-01-01 PROCEDURE — 74011250636 HC RX REV CODE- 250/636: Performed by: PHYSICIAN ASSISTANT

## 2017-01-01 PROCEDURE — 84484 ASSAY OF TROPONIN QUANT: CPT | Performed by: INTERNAL MEDICINE

## 2017-01-01 PROCEDURE — 74011250636 HC RX REV CODE- 250/636: Performed by: HOSPITALIST

## 2017-01-01 PROCEDURE — 74011250637 HC RX REV CODE- 250/637: Performed by: HOSPITALIST

## 2017-01-01 PROCEDURE — A4927 NON-STERILE GLOVES: HCPCS

## 2017-01-01 PROCEDURE — 74011250636 HC RX REV CODE- 250/636: Performed by: INTERNAL MEDICINE

## 2017-01-01 PROCEDURE — 99285 EMERGENCY DEPT VISIT HI MDM: CPT

## 2017-01-01 PROCEDURE — 77030018846 HC SOL IRR STRL H20 ICUM -A: Performed by: ORTHOPAEDIC SURGERY

## 2017-01-01 PROCEDURE — 77030026438 HC STYL ET INTUB CARD -A: Performed by: ANESTHESIOLOGY

## 2017-01-01 PROCEDURE — 74011250636 HC RX REV CODE- 250/636: Performed by: ORTHOPAEDIC SURGERY

## 2017-01-01 PROCEDURE — A9150 MISC/EXPER NON-PRESCRIPT DRU: HCPCS

## 2017-01-01 PROCEDURE — 99218 HC RM OBSERVATION: CPT

## 2017-01-01 PROCEDURE — 86900 BLOOD TYPING SEROLOGIC ABO: CPT | Performed by: HOSPITALIST

## 2017-01-01 PROCEDURE — A9270 NON-COVERED ITEM OR SERVICE: HCPCS

## 2017-01-01 PROCEDURE — T4523 ADULT SIZE BRIEF/DIAPER LG: HCPCS

## 2017-01-01 PROCEDURE — T4541 LARGE DISPOSABLE UNDERPAD: HCPCS

## 2017-01-01 PROCEDURE — 72170 X-RAY EXAM OF PELVIS: CPT

## 2017-01-01 PROCEDURE — 73552 X-RAY EXAM OF FEMUR 2/>: CPT

## 2017-01-01 PROCEDURE — 83540 ASSAY OF IRON: CPT | Performed by: INTERNAL MEDICINE

## 2017-01-01 PROCEDURE — 82550 ASSAY OF CK (CPK): CPT | Performed by: EMERGENCY MEDICINE

## 2017-01-01 PROCEDURE — 30233N1 TRANSFUSION OF NONAUTOLOGOUS RED BLOOD CELLS INTO PERIPHERAL VEIN, PERCUTANEOUS APPROACH: ICD-10-PCS | Performed by: HOSPITALIST

## 2017-01-01 PROCEDURE — A4928 SURGICAL MASK: HCPCS

## 2017-01-01 PROCEDURE — G0155 HHCP-SVS OF CSW,EA 15 MIN: HCPCS

## 2017-01-01 PROCEDURE — 96375 TX/PRO/DX INJ NEW DRUG ADDON: CPT

## 2017-01-01 PROCEDURE — S9470 NUTRITIONAL COUNSELING, DIET: HCPCS

## 2017-01-01 PROCEDURE — 74011250637 HC RX REV CODE- 250/637: Performed by: NURSE PRACTITIONER

## 2017-01-01 PROCEDURE — 36415 COLL VENOUS BLD VENIPUNCTURE: CPT | Performed by: EMERGENCY MEDICINE

## 2017-01-01 PROCEDURE — 82746 ASSAY OF FOLIC ACID SERUM: CPT | Performed by: INTERNAL MEDICINE

## 2017-01-01 PROCEDURE — G8979 MOBILITY GOAL STATUS: HCPCS

## 2017-01-01 PROCEDURE — 74011000250 HC RX REV CODE- 250: Performed by: INTERNAL MEDICINE

## 2017-01-01 PROCEDURE — 77030008684 HC TU ET CUF COVD -B: Performed by: ANESTHESIOLOGY

## 2017-01-01 PROCEDURE — 36430 TRANSFUSION BLD/BLD COMPNT: CPT

## 2017-01-01 PROCEDURE — 84484 ASSAY OF TROPONIN QUANT: CPT | Performed by: EMERGENCY MEDICINE

## 2017-01-01 PROCEDURE — G8997 SWALLOW GOAL STATUS: HCPCS | Performed by: SPEECH-LANGUAGE PATHOLOGIST

## 2017-01-01 PROCEDURE — 65660000000 HC RM CCU STEPDOWN

## 2017-01-01 PROCEDURE — C9113 INJ PANTOPRAZOLE SODIUM, VIA: HCPCS | Performed by: INTERNAL MEDICINE

## 2017-01-01 PROCEDURE — 74011250637 HC RX REV CODE- 250/637: Performed by: PHYSICIAN ASSISTANT

## 2017-01-01 PROCEDURE — 85025 COMPLETE CBC W/AUTO DIFF WBC: CPT | Performed by: INTERNAL MEDICINE

## 2017-01-01 PROCEDURE — 74011250637 HC RX REV CODE- 250/637: Performed by: ORTHOPAEDIC SURGERY

## 2017-01-01 PROCEDURE — 83735 ASSAY OF MAGNESIUM: CPT | Performed by: INTERNAL MEDICINE

## 2017-01-01 PROCEDURE — HHS11660

## 2017-01-01 PROCEDURE — G0300 HHS/HOSPICE OF LPN EA 15 MIN: HCPCS

## 2017-01-01 PROCEDURE — 80048 BASIC METABOLIC PNL TOTAL CA: CPT | Performed by: INTERNAL MEDICINE

## 2017-01-01 PROCEDURE — 74011250637 HC RX REV CODE- 250/637: Performed by: INTERNAL MEDICINE

## 2017-01-01 PROCEDURE — HHS11496

## 2017-01-01 PROCEDURE — 65660000001 HC RM ICU INTERMED STEPDOWN

## 2017-01-01 PROCEDURE — 74011000258 HC RX REV CODE- 258: Performed by: HOSPITALIST

## 2017-01-01 PROCEDURE — 06H03DZ INSERTION OF INTRALUMINAL DEVICE INTO INFERIOR VENA CAVA, PERCUTANEOUS APPROACH: ICD-10-PCS | Performed by: RADIOLOGY

## 2017-01-01 PROCEDURE — 80048 BASIC METABOLIC PNL TOTAL CA: CPT | Performed by: NURSE PRACTITIONER

## 2017-01-01 PROCEDURE — 51702 INSERT TEMP BLADDER CATH: CPT

## 2017-01-01 PROCEDURE — 84443 ASSAY THYROID STIM HORMONE: CPT | Performed by: INTERNAL MEDICINE

## 2017-01-01 PROCEDURE — T4526 ADULT SIZE PULL-ON MED: HCPCS

## 2017-01-01 PROCEDURE — 37191 INS ENDOVAS VENA CAVA FILTR: CPT

## 2017-01-01 PROCEDURE — 36415 COLL VENOUS BLD VENIPUNCTURE: CPT | Performed by: INTERNAL MEDICINE

## 2017-01-01 PROCEDURE — 73502 X-RAY EXAM HIP UNI 2-3 VIEWS: CPT

## 2017-01-01 PROCEDURE — 74011000250 HC RX REV CODE- 250: Performed by: RADIOLOGY

## 2017-01-01 PROCEDURE — 77030000031 HC BIT DRL QC SYNT -C: Performed by: ORTHOPAEDIC SURGERY

## 2017-01-01 PROCEDURE — 87077 CULTURE AEROBIC IDENTIFY: CPT | Performed by: HOSPITALIST

## 2017-01-01 PROCEDURE — 74011000258 HC RX REV CODE- 258: Performed by: ORTHOPAEDIC SURGERY

## 2017-01-01 PROCEDURE — 51701 INSERT BLADDER CATHETER: CPT

## 2017-01-01 PROCEDURE — 80053 COMPREHEN METABOLIC PANEL: CPT | Performed by: EMERGENCY MEDICINE

## 2017-01-01 PROCEDURE — G8978 MOBILITY CURRENT STATUS: HCPCS

## 2017-01-01 PROCEDURE — 80047 BASIC METABLC PNL IONIZED CA: CPT

## 2017-01-01 PROCEDURE — 70450 CT HEAD/BRAIN W/O DYE: CPT

## 2017-01-01 PROCEDURE — 86923 COMPATIBILITY TEST ELECTRIC: CPT | Performed by: HOSPITALIST

## 2017-01-01 PROCEDURE — 76060000035 HC ANESTHESIA 2 TO 2.5 HR: Performed by: ORTHOPAEDIC SURGERY

## 2017-01-01 PROCEDURE — 77030008467 HC STPLR SKN COVD -B: Performed by: ORTHOPAEDIC SURGERY

## 2017-01-01 PROCEDURE — C1892 INTRO/SHEATH,FIXED,PEEL-AWAY: HCPCS

## 2017-01-01 PROCEDURE — 77030018836 HC SOL IRR NACL ICUM -A: Performed by: ORTHOPAEDIC SURGERY

## 2017-01-01 PROCEDURE — 87086 URINE CULTURE/COLONY COUNT: CPT | Performed by: EMERGENCY MEDICINE

## 2017-01-01 PROCEDURE — 36415 COLL VENOUS BLD VENIPUNCTURE: CPT | Performed by: HOSPITALIST

## 2017-01-01 PROCEDURE — 85025 COMPLETE CBC W/AUTO DIFF WBC: CPT | Performed by: EMERGENCY MEDICINE

## 2017-01-01 PROCEDURE — 74011636320 HC RX REV CODE- 636/320: Performed by: RADIOLOGY

## 2017-01-01 PROCEDURE — 85025 COMPLETE CBC W/AUTO DIFF WBC: CPT | Performed by: HOSPITALIST

## 2017-01-01 PROCEDURE — 84100 ASSAY OF PHOSPHORUS: CPT | Performed by: HOSPITALIST

## 2017-01-01 PROCEDURE — C1713 ANCHOR/SCREW BN/BN,TIS/BN: HCPCS | Performed by: ORTHOPAEDIC SURGERY

## 2017-01-01 PROCEDURE — 77030018903: Performed by: ORTHOPAEDIC SURGERY

## 2017-01-01 PROCEDURE — G8996 SWALLOW CURRENT STATUS: HCPCS | Performed by: SPEECH-LANGUAGE PATHOLOGIST

## 2017-01-01 PROCEDURE — 80053 COMPREHEN METABOLIC PANEL: CPT | Performed by: HOSPITALIST

## 2017-01-01 PROCEDURE — 76010000131 HC OR TIME 2 TO 2.5 HR: Performed by: ORTHOPAEDIC SURGERY

## 2017-01-01 PROCEDURE — 77030005514 HC CATH URETH FOL14 BARD -A

## 2017-01-01 PROCEDURE — 87086 URINE CULTURE/COLONY COUNT: CPT | Performed by: HOSPITALIST

## 2017-01-01 PROCEDURE — G8998 SWALLOW D/C STATUS: HCPCS | Performed by: SPEECH-LANGUAGE PATHOLOGIST

## 2017-01-01 PROCEDURE — 94762 N-INVAS EAR/PLS OXIMTRY CONT: CPT

## 2017-01-01 PROCEDURE — 74011250636 HC RX REV CODE- 250/636

## 2017-01-01 PROCEDURE — 80048 BASIC METABOLIC PNL TOTAL CA: CPT | Performed by: HOSPITALIST

## 2017-01-01 PROCEDURE — G8988 SELF CARE GOAL STATUS: HCPCS

## 2017-01-01 PROCEDURE — P9016 RBC LEUKOCYTES REDUCED: HCPCS | Performed by: HOSPITALIST

## 2017-01-01 PROCEDURE — 77030006619 HC BLD FEM RETRO SYNT -F: Performed by: ORTHOPAEDIC SURGERY

## 2017-01-01 PROCEDURE — 85027 COMPLETE CBC AUTOMATED: CPT | Performed by: HOSPITALIST

## 2017-01-01 PROCEDURE — 81001 URINALYSIS AUTO W/SCOPE: CPT | Performed by: EMERGENCY MEDICINE

## 2017-01-01 PROCEDURE — 76210000006 HC OR PH I REC 0.5 TO 1 HR: Performed by: ORTHOPAEDIC SURGERY

## 2017-01-01 PROCEDURE — 90471 IMMUNIZATION ADMIN: CPT

## 2017-01-01 PROCEDURE — 85018 HEMOGLOBIN: CPT | Performed by: HOSPITALIST

## 2017-01-01 PROCEDURE — C1880 VENA CAVA FILTER: HCPCS

## 2017-01-01 PROCEDURE — 96365 THER/PROPH/DIAG IV INF INIT: CPT

## 2017-01-01 PROCEDURE — 96361 HYDRATE IV INFUSION ADD-ON: CPT

## 2017-01-01 PROCEDURE — 97535 SELF CARE MNGMENT TRAINING: CPT

## 2017-01-01 PROCEDURE — 90686 IIV4 VACC NO PRSV 0.5 ML IM: CPT | Performed by: HOSPITALIST

## 2017-01-01 PROCEDURE — 0QS906Z REPOSITION LEFT FEMORAL SHAFT WITH INTRAMEDULLARY INTERNAL FIXATION DEVICE, OPEN APPROACH: ICD-10-PCS | Performed by: ORTHOPAEDIC SURGERY

## 2017-01-01 PROCEDURE — 74011000250 HC RX REV CODE- 250

## 2017-01-01 PROCEDURE — 74011000258 HC RX REV CODE- 258: Performed by: EMERGENCY MEDICINE

## 2017-01-01 PROCEDURE — 36415 COLL VENOUS BLD VENIPUNCTURE: CPT | Performed by: NURSE PRACTITIONER

## 2017-01-01 PROCEDURE — 74177 CT ABD & PELVIS W/CONTRAST: CPT

## 2017-01-01 PROCEDURE — 77030016474 HC BIT DRL QC3 SYNT -C: Performed by: ORTHOPAEDIC SURGERY

## 2017-01-01 PROCEDURE — 74011000258 HC RX REV CODE- 258: Performed by: INTERNAL MEDICINE

## 2017-01-01 PROCEDURE — 71010 XR CHEST SNGL V: CPT

## 2017-01-01 PROCEDURE — 73700 CT LOWER EXTREMITY W/O DYE: CPT

## 2017-01-01 PROCEDURE — 74011250636 HC RX REV CODE- 250/636: Performed by: NURSE PRACTITIONER

## 2017-01-01 PROCEDURE — 77030011640 HC PAD GRND REM COVD -A: Performed by: ORTHOPAEDIC SURGERY

## 2017-01-01 PROCEDURE — 77030028224 HC PDNG CST BSNM -A: Performed by: ORTHOPAEDIC SURGERY

## 2017-01-01 PROCEDURE — 77030014405 HC GD ROD RMR SYNT -C: Performed by: ORTHOPAEDIC SURGERY

## 2017-01-01 PROCEDURE — 85610 PROTHROMBIN TIME: CPT | Performed by: EMERGENCY MEDICINE

## 2017-01-01 PROCEDURE — 77030029131 HC ADMN ST IV BLD N DEHP ICUM -B

## 2017-01-01 PROCEDURE — 77030005563 HC CATH URETH INT MMGH -A

## 2017-01-01 PROCEDURE — G8987 SELF CARE CURRENT STATUS: HCPCS

## 2017-01-01 PROCEDURE — 97165 OT EVAL LOW COMPLEX 30 MIN: CPT

## 2017-01-01 PROCEDURE — 73590 X-RAY EXAM OF LOWER LEG: CPT

## 2017-01-01 PROCEDURE — HHS10554 SHAMPOO/BODY WASH 8 OZ ALOE VESTA

## 2017-01-01 PROCEDURE — 75810000301 HC ER LEVEL 1 CLOSED TREATMNT FRACTURE/DISLOCATION

## 2017-01-01 PROCEDURE — 93306 TTE W/DOPPLER COMPLETE: CPT

## 2017-01-01 PROCEDURE — 77030031139 HC SUT VCRL2 J&J -A: Performed by: ORTHOPAEDIC SURGERY

## 2017-01-01 PROCEDURE — 83735 ASSAY OF MAGNESIUM: CPT | Performed by: HOSPITALIST

## 2017-01-01 PROCEDURE — 97161 PT EVAL LOW COMPLEX 20 MIN: CPT

## 2017-01-01 PROCEDURE — 80053 COMPREHEN METABOLIC PANEL: CPT | Performed by: INTERNAL MEDICINE

## 2017-01-01 PROCEDURE — C1769 GUIDE WIRE: HCPCS

## 2017-01-01 PROCEDURE — 3336500001 HSPC ELECTION

## 2017-01-01 PROCEDURE — A6260 WOUND CLEANSER ANY TYPE/SIZE: HCPCS

## 2017-01-01 PROCEDURE — 92610 EVALUATE SWALLOWING FUNCTION: CPT | Performed by: SPEECH-LANGUAGE PATHOLOGIST

## 2017-01-01 PROCEDURE — 82728 ASSAY OF FERRITIN: CPT | Performed by: INTERNAL MEDICINE

## 2017-01-01 PROCEDURE — 77030019908 HC STETH ESOPH SIMS -A: Performed by: ANESTHESIOLOGY

## 2017-01-01 PROCEDURE — 82272 OCCULT BLD FECES 1-3 TESTS: CPT | Performed by: INTERNAL MEDICINE

## 2017-01-01 PROCEDURE — C1769 GUIDE WIRE: HCPCS | Performed by: ORTHOPAEDIC SURGERY

## 2017-01-01 PROCEDURE — 76450000000

## 2017-01-01 PROCEDURE — 76001 XR FLUOROSCOPY OVER 60 MINUTES: CPT

## 2017-01-01 PROCEDURE — 93971 EXTREMITY STUDY: CPT

## 2017-01-01 PROCEDURE — 97530 THERAPEUTIC ACTIVITIES: CPT

## 2017-01-01 PROCEDURE — 82550 ASSAY OF CK (CPK): CPT | Performed by: INTERNAL MEDICINE

## 2017-01-01 PROCEDURE — 85025 COMPLETE CBC W/AUTO DIFF WBC: CPT | Performed by: NURSE PRACTITIONER

## 2017-01-01 PROCEDURE — 2W3MX1Z IMMOBILIZATION OF LEFT LOWER EXTREMITY USING SPLINT: ICD-10-PCS | Performed by: EMERGENCY MEDICINE

## 2017-01-01 PROCEDURE — 82607 VITAMIN B-12: CPT | Performed by: INTERNAL MEDICINE

## 2017-01-01 PROCEDURE — 87186 SC STD MICRODIL/AGAR DIL: CPT | Performed by: HOSPITALIST

## 2017-01-01 PROCEDURE — 74011636320 HC RX REV CODE- 636/320: Performed by: EMERGENCY MEDICINE

## 2017-01-01 PROCEDURE — 93005 ELECTROCARDIOGRAM TRACING: CPT

## 2017-01-01 PROCEDURE — 72100 X-RAY EXAM L-S SPINE 2/3 VWS: CPT

## 2017-01-01 PROCEDURE — 83690 ASSAY OF LIPASE: CPT | Performed by: EMERGENCY MEDICINE

## 2017-01-01 DEVICE — SCREW BNE L66MM DIA5MM TIB LT GRN TI ST CANN LOK FULL THRD: Type: IMPLANTABLE DEVICE | Site: LEG | Status: FUNCTIONAL

## 2017-01-01 DEVICE — SCREW BNE L32MM DIA5MM NONSTERILE TIB LT GRN TI ST CANN LOK: Type: IMPLANTABLE DEVICE | Site: LEG | Status: FUNCTIONAL

## 2017-01-01 DEVICE — ENDCAP ORTH DIA12MM 0MM EXTN ST TI SPRL BLDE LOK T40: Type: IMPLANTABLE DEVICE | Site: LEG | Status: FUNCTIONAL

## 2017-01-01 RX ORDER — LIDOCAINE HYDROCHLORIDE 20 MG/ML
20 INJECTION, SOLUTION INFILTRATION; PERINEURAL
Status: COMPLETED | OUTPATIENT
Start: 2017-01-01 | End: 2017-01-01

## 2017-01-01 RX ORDER — MORPHINE SULFATE 2 MG/ML
1-2 INJECTION, SOLUTION INTRAMUSCULAR; INTRAVENOUS
Status: DISCONTINUED | OUTPATIENT
Start: 2017-01-01 | End: 2017-01-01 | Stop reason: HOSPADM

## 2017-01-01 RX ORDER — SODIUM CHLORIDE 9 MG/ML
75 INJECTION, SOLUTION INTRAVENOUS CONTINUOUS
Status: DISCONTINUED | OUTPATIENT
Start: 2017-01-01 | End: 2017-01-01

## 2017-01-01 RX ORDER — SODIUM CHLORIDE 9 MG/ML
250 INJECTION, SOLUTION INTRAVENOUS AS NEEDED
Status: DISCONTINUED | OUTPATIENT
Start: 2017-01-01 | End: 2017-01-01 | Stop reason: HOSPADM

## 2017-01-01 RX ORDER — TRAMADOL HYDROCHLORIDE 50 MG/1
50 TABLET ORAL
Status: DISCONTINUED | OUTPATIENT
Start: 2017-01-01 | End: 2017-01-01 | Stop reason: HOSPADM

## 2017-01-01 RX ORDER — CEFAZOLIN SODIUM IN 0.9 % NACL 2 G/100 ML
2 PLASTIC BAG, INJECTION (ML) INTRAVENOUS
Status: DISCONTINUED | OUTPATIENT
Start: 2017-01-01 | End: 2017-01-01 | Stop reason: HOSPADM

## 2017-01-01 RX ORDER — ONDANSETRON 2 MG/ML
4 INJECTION INTRAMUSCULAR; INTRAVENOUS
Status: DISCONTINUED | OUTPATIENT
Start: 2017-01-01 | End: 2017-01-01 | Stop reason: HOSPADM

## 2017-01-01 RX ORDER — MORPHINE SULFATE 10 MG/ML
2 INJECTION, SOLUTION INTRAMUSCULAR; INTRAVENOUS
Status: DISCONTINUED | OUTPATIENT
Start: 2017-01-01 | End: 2017-01-01 | Stop reason: HOSPADM

## 2017-01-01 RX ORDER — DEXTROSE MONOHYDRATE AND SODIUM CHLORIDE 5; .45 G/100ML; G/100ML
100 INJECTION, SOLUTION INTRAVENOUS AS NEEDED
Status: DISCONTINUED | OUTPATIENT
Start: 2017-01-01 | End: 2017-01-01 | Stop reason: HOSPADM

## 2017-01-01 RX ORDER — PROPOFOL 10 MG/ML
0.5 INJECTION, EMULSION INTRAVENOUS
Status: COMPLETED | OUTPATIENT
Start: 2017-01-01 | End: 2017-01-01

## 2017-01-01 RX ORDER — ENOXAPARIN SODIUM 150 MG/ML
111 INJECTION SUBCUTANEOUS EVERY 12 HOURS
Status: DISCONTINUED | OUTPATIENT
Start: 2017-01-01 | End: 2017-01-01

## 2017-01-01 RX ORDER — POLYETHYLENE GLYCOL 3350 17 G/17G
17 POWDER, FOR SOLUTION ORAL DAILY
Qty: 30 EACH | Refills: 1 | Status: SHIPPED | OUTPATIENT
Start: 2017-01-01

## 2017-01-01 RX ORDER — DEXTROSE, SODIUM CHLORIDE, AND POTASSIUM CHLORIDE 5; .45; .075 G/100ML; G/100ML; G/100ML
75 INJECTION INTRAVENOUS CONTINUOUS
Status: DISCONTINUED | OUTPATIENT
Start: 2017-01-01 | End: 2017-01-01 | Stop reason: HOSPADM

## 2017-01-01 RX ORDER — PANTOPRAZOLE SODIUM 40 MG/10ML
40 INJECTION, POWDER, LYOPHILIZED, FOR SOLUTION INTRAVENOUS DAILY
Status: DISCONTINUED | OUTPATIENT
Start: 2017-01-01 | End: 2017-01-01 | Stop reason: SDUPTHER

## 2017-01-01 RX ORDER — FENTANYL CITRATE 50 UG/ML
INJECTION, SOLUTION INTRAMUSCULAR; INTRAVENOUS AS NEEDED
Status: DISCONTINUED | OUTPATIENT
Start: 2017-01-01 | End: 2017-01-01

## 2017-01-01 RX ORDER — CEFUROXIME AXETIL 500 MG/1
500 TABLET ORAL 2 TIMES DAILY
Qty: 10 TAB | Refills: 0 | Status: SHIPPED | OUTPATIENT
Start: 2017-01-01 | End: 2017-01-01

## 2017-01-01 RX ORDER — ONDANSETRON 2 MG/ML
4 INJECTION INTRAMUSCULAR; INTRAVENOUS
Status: DISCONTINUED | OUTPATIENT
Start: 2017-01-01 | End: 2017-01-01 | Stop reason: SDUPTHER

## 2017-01-01 RX ORDER — NEOSTIGMINE METHYLSULFATE 1 MG/ML
INJECTION INTRAVENOUS AS NEEDED
Status: DISCONTINUED | OUTPATIENT
Start: 2017-01-01 | End: 2017-01-01 | Stop reason: HOSPADM

## 2017-01-01 RX ORDER — SODIUM CHLORIDE 0.9 % (FLUSH) 0.9 %
10 SYRINGE (ML) INJECTION
Status: COMPLETED | OUTPATIENT
Start: 2017-01-01 | End: 2017-01-01

## 2017-01-01 RX ORDER — SODIUM CHLORIDE 9 MG/ML
75 INJECTION, SOLUTION INTRAVENOUS CONTINUOUS
Status: DISCONTINUED | OUTPATIENT
Start: 2017-01-01 | End: 2017-01-01 | Stop reason: HOSPADM

## 2017-01-01 RX ORDER — DEXTROSE MONOHYDRATE AND SODIUM CHLORIDE 5; .9 G/100ML; G/100ML
75 INJECTION, SOLUTION INTRAVENOUS CONTINUOUS
Status: DISCONTINUED | OUTPATIENT
Start: 2017-01-01 | End: 2017-01-01

## 2017-01-01 RX ORDER — OXYCODONE HYDROCHLORIDE 5 MG/1
2.5 TABLET ORAL
Status: DISCONTINUED | OUTPATIENT
Start: 2017-01-01 | End: 2017-01-01 | Stop reason: HOSPADM

## 2017-01-01 RX ORDER — SODIUM CHLORIDE 0.9 % (FLUSH) 0.9 %
5-10 SYRINGE (ML) INJECTION EVERY 8 HOURS
Status: DISCONTINUED | OUTPATIENT
Start: 2017-01-01 | End: 2017-01-01 | Stop reason: HOSPADM

## 2017-01-01 RX ORDER — NALOXONE HYDROCHLORIDE 0.4 MG/ML
0.4 INJECTION, SOLUTION INTRAMUSCULAR; INTRAVENOUS; SUBCUTANEOUS AS NEEDED
Status: DISCONTINUED | OUTPATIENT
Start: 2017-01-01 | End: 2017-01-01 | Stop reason: HOSPADM

## 2017-01-01 RX ORDER — FENTANYL CITRATE 50 UG/ML
INJECTION, SOLUTION INTRAMUSCULAR; INTRAVENOUS AS NEEDED
Status: DISCONTINUED | OUTPATIENT
Start: 2017-01-01 | End: 2017-01-01 | Stop reason: HOSPADM

## 2017-01-01 RX ORDER — SODIUM CHLORIDE 0.9 % (FLUSH) 0.9 %
5-10 SYRINGE (ML) INJECTION EVERY 8 HOURS
Status: DISCONTINUED | OUTPATIENT
Start: 2017-01-01 | End: 2017-01-01 | Stop reason: SDUPTHER

## 2017-01-01 RX ORDER — POLYETHYLENE GLYCOL 3350 17 G/17G
17 POWDER, FOR SOLUTION ORAL DAILY
Status: DISCONTINUED | OUTPATIENT
Start: 2017-01-01 | End: 2017-01-01 | Stop reason: HOSPADM

## 2017-01-01 RX ORDER — FERROUS SULFATE, DRIED 160(50) MG
1 TABLET, EXTENDED RELEASE ORAL
Qty: 90 TAB | Refills: 0 | Status: SHIPPED | OUTPATIENT
Start: 2017-01-01

## 2017-01-01 RX ORDER — DONEPEZIL HYDROCHLORIDE 5 MG/1
5 TABLET, FILM COATED ORAL
Status: DISCONTINUED | OUTPATIENT
Start: 2017-01-01 | End: 2017-01-01 | Stop reason: HOSPADM

## 2017-01-01 RX ORDER — DOCUSATE SODIUM 100 MG/1
100 CAPSULE, LIQUID FILLED ORAL 2 TIMES DAILY
Qty: 60 CAP | Refills: 0 | Status: SHIPPED | OUTPATIENT
Start: 2017-01-01 | End: 2017-01-01

## 2017-01-01 RX ORDER — DIPHENHYDRAMINE HYDROCHLORIDE 50 MG/ML
12.5 INJECTION, SOLUTION INTRAMUSCULAR; INTRAVENOUS AS NEEDED
Status: DISCONTINUED | OUTPATIENT
Start: 2017-01-01 | End: 2017-01-01 | Stop reason: HOSPADM

## 2017-01-01 RX ORDER — ACETAMINOPHEN 500 MG
500 TABLET ORAL
Status: DISCONTINUED | OUTPATIENT
Start: 2017-01-01 | End: 2017-01-01 | Stop reason: HOSPADM

## 2017-01-01 RX ORDER — AMOXICILLIN 250 MG
1 CAPSULE ORAL
COMMUNITY
End: 2017-01-01

## 2017-01-01 RX ORDER — ONDANSETRON 2 MG/ML
2 INJECTION INTRAMUSCULAR; INTRAVENOUS ONCE
Status: COMPLETED | OUTPATIENT
Start: 2017-01-01 | End: 2017-01-01

## 2017-01-01 RX ORDER — ACETAMINOPHEN 325 MG/1
650 TABLET ORAL EVERY 6 HOURS
Status: DISCONTINUED | OUTPATIENT
Start: 2017-01-01 | End: 2017-01-01 | Stop reason: HOSPADM

## 2017-01-01 RX ORDER — ACETAMINOPHEN 325 MG/1
650 TABLET ORAL EVERY 6 HOURS
Status: DISCONTINUED | OUTPATIENT
Start: 2017-01-01 | End: 2017-01-01

## 2017-01-01 RX ORDER — TRAMADOL HYDROCHLORIDE 50 MG/1
50 TABLET ORAL
Qty: 20 TAB | Refills: 0 | Status: SHIPPED | OUTPATIENT
Start: 2017-01-01 | End: 2017-01-01

## 2017-01-01 RX ORDER — MORPHINE SULFATE 4 MG/ML
1 INJECTION, SOLUTION INTRAMUSCULAR; INTRAVENOUS
Status: DISPENSED | OUTPATIENT
Start: 2017-01-01 | End: 2017-01-01

## 2017-01-01 RX ORDER — OXYCODONE HYDROCHLORIDE 5 MG/1
5 TABLET ORAL
Status: DISCONTINUED | OUTPATIENT
Start: 2017-01-01 | End: 2017-01-01 | Stop reason: HOSPADM

## 2017-01-01 RX ORDER — FENTANYL CITRATE 50 UG/ML
25 INJECTION, SOLUTION INTRAMUSCULAR; INTRAVENOUS
Status: COMPLETED | OUTPATIENT
Start: 2017-01-01 | End: 2017-01-01

## 2017-01-01 RX ORDER — SODIUM CHLORIDE, SODIUM LACTATE, POTASSIUM CHLORIDE, CALCIUM CHLORIDE 600; 310; 30; 20 MG/100ML; MG/100ML; MG/100ML; MG/100ML
INJECTION, SOLUTION INTRAVENOUS
Status: DISCONTINUED | OUTPATIENT
Start: 2017-01-01 | End: 2017-01-01 | Stop reason: HOSPADM

## 2017-01-01 RX ORDER — FENTANYL CITRATE 50 UG/ML
25 INJECTION, SOLUTION INTRAMUSCULAR; INTRAVENOUS ONCE
Status: COMPLETED | OUTPATIENT
Start: 2017-01-01 | End: 2017-01-01

## 2017-01-01 RX ORDER — SODIUM CHLORIDE, SODIUM LACTATE, POTASSIUM CHLORIDE, CALCIUM CHLORIDE 600; 310; 30; 20 MG/100ML; MG/100ML; MG/100ML; MG/100ML
25 INJECTION, SOLUTION INTRAVENOUS CONTINUOUS
Status: DISCONTINUED | OUTPATIENT
Start: 2017-01-01 | End: 2017-01-01 | Stop reason: HOSPADM

## 2017-01-01 RX ORDER — SODIUM CHLORIDE 0.9 % (FLUSH) 0.9 %
5-10 SYRINGE (ML) INJECTION AS NEEDED
Status: DISCONTINUED | OUTPATIENT
Start: 2017-01-01 | End: 2017-01-01 | Stop reason: HOSPADM

## 2017-01-01 RX ORDER — AMOXICILLIN 250 MG
1 CAPSULE ORAL 2 TIMES DAILY
Status: DISCONTINUED | OUTPATIENT
Start: 2017-01-01 | End: 2017-01-01

## 2017-01-01 RX ORDER — ROCURONIUM BROMIDE 10 MG/ML
INJECTION, SOLUTION INTRAVENOUS AS NEEDED
Status: DISCONTINUED | OUTPATIENT
Start: 2017-01-01 | End: 2017-01-01 | Stop reason: HOSPADM

## 2017-01-01 RX ORDER — OXYCODONE HYDROCHLORIDE 5 MG/1
5 TABLET ORAL
Qty: 30 TAB | Refills: 0 | Status: SHIPPED | OUTPATIENT
Start: 2017-01-01 | End: 2017-01-01

## 2017-01-01 RX ORDER — SODIUM CHLORIDE 9 MG/ML
1000 INJECTION, SOLUTION INTRAVENOUS
Status: COMPLETED | OUTPATIENT
Start: 2017-01-01 | End: 2017-01-01

## 2017-01-01 RX ORDER — METOPROLOL TARTRATE 25 MG/1
25 TABLET, FILM COATED ORAL 2 TIMES DAILY
COMMUNITY
End: 2017-01-01

## 2017-01-01 RX ORDER — POTASSIUM CHLORIDE 7.45 MG/ML
10 INJECTION INTRAVENOUS
Status: DISCONTINUED | OUTPATIENT
Start: 2017-01-01 | End: 2017-01-01 | Stop reason: SDUPTHER

## 2017-01-01 RX ORDER — FAMOTIDINE 20 MG/1
20 TABLET, FILM COATED ORAL 2 TIMES DAILY
Status: DISCONTINUED | OUTPATIENT
Start: 2017-01-01 | End: 2017-01-01 | Stop reason: HOSPADM

## 2017-01-01 RX ORDER — ACETAMINOPHEN 325 MG/1
650 TABLET ORAL
Qty: 40 TAB | Refills: 0 | Status: SHIPPED | OUTPATIENT
Start: 2017-01-01

## 2017-01-01 RX ORDER — DONEPEZIL HYDROCHLORIDE 5 MG/1
10 TABLET, FILM COATED ORAL
COMMUNITY
Start: 2017-01-01

## 2017-01-01 RX ORDER — HEPARIN SODIUM 5000 [USP'U]/ML
5000 INJECTION, SOLUTION INTRAVENOUS; SUBCUTANEOUS EVERY 8 HOURS
Status: DISCONTINUED | OUTPATIENT
Start: 2017-01-01 | End: 2017-01-01

## 2017-01-01 RX ORDER — HYDROMORPHONE HYDROCHLORIDE 1 MG/ML
.2-.5 INJECTION, SOLUTION INTRAMUSCULAR; INTRAVENOUS; SUBCUTANEOUS
Status: DISCONTINUED | OUTPATIENT
Start: 2017-01-01 | End: 2017-01-01 | Stop reason: HOSPADM

## 2017-01-01 RX ORDER — PROPOFOL 10 MG/ML
INJECTION, EMULSION INTRAVENOUS AS NEEDED
Status: DISCONTINUED | OUTPATIENT
Start: 2017-01-01 | End: 2017-01-01 | Stop reason: HOSPADM

## 2017-01-01 RX ORDER — SODIUM CHLORIDE 0.9 % (FLUSH) 0.9 %
SYRINGE (ML) INJECTION
Status: DISCONTINUED
Start: 2017-01-01 | End: 2017-01-01 | Stop reason: HOSPADM

## 2017-01-01 RX ORDER — ENOXAPARIN SODIUM 100 MG/ML
20 INJECTION SUBCUTANEOUS DAILY
Status: DISCONTINUED | OUTPATIENT
Start: 2017-01-01 | End: 2017-01-01 | Stop reason: HOSPADM

## 2017-01-01 RX ORDER — LIDOCAINE HYDROCHLORIDE 20 MG/ML
INJECTION, SOLUTION EPIDURAL; INFILTRATION; INTRACAUDAL; PERINEURAL AS NEEDED
Status: DISCONTINUED | OUTPATIENT
Start: 2017-01-01 | End: 2017-01-01 | Stop reason: HOSPADM

## 2017-01-01 RX ORDER — POTASSIUM CHLORIDE 7.45 MG/ML
10 INJECTION INTRAVENOUS
Status: COMPLETED | OUTPATIENT
Start: 2017-01-01 | End: 2017-01-01

## 2017-01-01 RX ORDER — GLYCOPYRROLATE 0.2 MG/ML
INJECTION INTRAMUSCULAR; INTRAVENOUS AS NEEDED
Status: DISCONTINUED | OUTPATIENT
Start: 2017-01-01 | End: 2017-01-01 | Stop reason: HOSPADM

## 2017-01-01 RX ORDER — LIDOCAINE HYDROCHLORIDE 10 MG/ML
0.1 INJECTION, SOLUTION EPIDURAL; INFILTRATION; INTRACAUDAL; PERINEURAL AS NEEDED
Status: DISCONTINUED | OUTPATIENT
Start: 2017-01-01 | End: 2017-01-01 | Stop reason: HOSPADM

## 2017-01-01 RX ORDER — METOPROLOL SUCCINATE 25 MG/1
25 TABLET, EXTENDED RELEASE ORAL DAILY
Status: DISCONTINUED | OUTPATIENT
Start: 2017-01-01 | End: 2017-01-01 | Stop reason: HOSPADM

## 2017-01-01 RX ORDER — FERROUS SULFATE, DRIED 160(50) MG
1 TABLET, EXTENDED RELEASE ORAL
Status: DISCONTINUED | OUTPATIENT
Start: 2017-01-01 | End: 2017-01-01 | Stop reason: HOSPADM

## 2017-01-01 RX ORDER — CEFAZOLIN SODIUM IN 0.9 % NACL 2 G/100 ML
PLASTIC BAG, INJECTION (ML) INTRAVENOUS AS NEEDED
Status: DISCONTINUED | OUTPATIENT
Start: 2017-01-01 | End: 2017-01-01 | Stop reason: HOSPADM

## 2017-01-01 RX ORDER — SODIUM CHLORIDE 9 MG/ML
125 INJECTION, SOLUTION INTRAVENOUS CONTINUOUS
Status: DISCONTINUED | OUTPATIENT
Start: 2017-01-01 | End: 2017-01-01

## 2017-01-01 RX ORDER — PHENYLEPHRINE HCL IN 0.9% NACL 0.4MG/10ML
SYRINGE (ML) INTRAVENOUS AS NEEDED
Status: DISCONTINUED | OUTPATIENT
Start: 2017-01-01 | End: 2017-01-01 | Stop reason: HOSPADM

## 2017-01-01 RX ORDER — DOCUSATE SODIUM 100 MG/1
100 CAPSULE, LIQUID FILLED ORAL 2 TIMES DAILY
Status: DISCONTINUED | OUTPATIENT
Start: 2017-01-01 | End: 2017-01-01 | Stop reason: HOSPADM

## 2017-01-01 RX ORDER — DONEPEZIL HYDROCHLORIDE 5 MG/1
10 TABLET, FILM COATED ORAL
Status: DISCONTINUED | OUTPATIENT
Start: 2017-01-01 | End: 2017-01-01 | Stop reason: HOSPADM

## 2017-01-01 RX ORDER — FACIAL-BODY WIPES
10 EACH TOPICAL DAILY PRN
Status: DISCONTINUED | OUTPATIENT
Start: 2017-01-01 | End: 2017-01-01 | Stop reason: HOSPADM

## 2017-01-01 RX ORDER — FACIAL-BODY WIPES
10 EACH TOPICAL DAILY PRN
Status: DISCONTINUED | OUTPATIENT
Start: 2017-01-01 | End: 2017-01-01 | Stop reason: SDUPTHER

## 2017-01-01 RX ORDER — ACETAMINOPHEN 500 MG
500 TABLET ORAL
COMMUNITY
End: 2017-01-01

## 2017-01-01 RX ORDER — HYDROMORPHONE HYDROCHLORIDE 2 MG/ML
INJECTION, SOLUTION INTRAMUSCULAR; INTRAVENOUS; SUBCUTANEOUS AS NEEDED
Status: DISCONTINUED | OUTPATIENT
Start: 2017-01-01 | End: 2017-01-01 | Stop reason: HOSPADM

## 2017-01-01 RX ORDER — ACETAMINOPHEN 10 MG/ML
INJECTION, SOLUTION INTRAVENOUS AS NEEDED
Status: DISCONTINUED | OUTPATIENT
Start: 2017-01-01 | End: 2017-01-01 | Stop reason: HOSPADM

## 2017-01-01 RX ORDER — FENTANYL CITRATE 50 UG/ML
25 INJECTION, SOLUTION INTRAMUSCULAR; INTRAVENOUS
Status: DISCONTINUED | OUTPATIENT
Start: 2017-01-01 | End: 2017-01-01 | Stop reason: HOSPADM

## 2017-01-01 RX ORDER — HEPARIN SODIUM 5000 [USP'U]/ML
INJECTION, SOLUTION INTRAVENOUS; SUBCUTANEOUS
Status: COMPLETED
Start: 2017-01-01 | End: 2017-01-01

## 2017-01-01 RX ORDER — FAMOTIDINE 20 MG/1
20 TABLET, FILM COATED ORAL 2 TIMES DAILY
Qty: 60 TAB | Refills: 1 | Status: SHIPPED | OUTPATIENT
Start: 2017-01-01

## 2017-01-01 RX ORDER — FENTANYL CITRATE 50 UG/ML
50 INJECTION, SOLUTION INTRAMUSCULAR; INTRAVENOUS AS NEEDED
Status: DISCONTINUED | OUTPATIENT
Start: 2017-01-01 | End: 2017-01-01 | Stop reason: HOSPADM

## 2017-01-01 RX ORDER — OXYCODONE HYDROCHLORIDE 5 MG/1
2.5 TABLET ORAL EVERY 12 HOURS
Status: DISCONTINUED | OUTPATIENT
Start: 2017-01-01 | End: 2017-01-01 | Stop reason: HOSPADM

## 2017-01-01 RX ORDER — MIDAZOLAM HYDROCHLORIDE 1 MG/ML
0.5 INJECTION, SOLUTION INTRAMUSCULAR; INTRAVENOUS
Status: DISCONTINUED | OUTPATIENT
Start: 2017-01-01 | End: 2017-01-01 | Stop reason: HOSPADM

## 2017-01-01 RX ORDER — AMOXICILLIN 250 MG
2 CAPSULE ORAL 2 TIMES DAILY
Status: DISCONTINUED | OUTPATIENT
Start: 2017-01-01 | End: 2017-01-01 | Stop reason: HOSPADM

## 2017-01-01 RX ORDER — OXYCODONE HYDROCHLORIDE 5 MG/1
2.5 TABLET ORAL
Qty: 30 TAB | Refills: 0 | Status: SHIPPED | OUTPATIENT
Start: 2017-01-01

## 2017-01-01 RX ORDER — ENOXAPARIN SODIUM 100 MG/ML
20 INJECTION SUBCUTANEOUS DAILY
Qty: 4.2 ML | Refills: 0 | Status: SHIPPED | OUTPATIENT
Start: 2017-01-01 | End: 2017-01-01

## 2017-01-01 RX ORDER — CEFAZOLIN SODIUM IN 0.9 % NACL 2 G/50 ML
2 INTRAVENOUS SOLUTION, PIGGYBACK (ML) INTRAVENOUS ONCE
Status: CANCELLED | OUTPATIENT
Start: 2017-01-01 | End: 2017-01-01

## 2017-01-01 RX ORDER — METOPROLOL SUCCINATE 25 MG/1
25 TABLET, EXTENDED RELEASE ORAL
COMMUNITY
End: 2017-01-01

## 2017-01-01 RX ORDER — ONDANSETRON 4 MG/1
4 TABLET, ORALLY DISINTEGRATING ORAL
Status: DISCONTINUED | OUTPATIENT
Start: 2017-01-01 | End: 2017-01-01 | Stop reason: SDUPTHER

## 2017-01-01 RX ORDER — POLYETHYLENE GLYCOL 3350 17 G/17G
17 POWDER, FOR SOLUTION ORAL DAILY
Status: DISCONTINUED | OUTPATIENT
Start: 2017-01-01 | End: 2017-01-01 | Stop reason: SDUPTHER

## 2017-01-01 RX ORDER — SODIUM CHLORIDE 9 MG/ML
75 INJECTION, SOLUTION INTRAVENOUS CONTINUOUS
Status: DISPENSED | OUTPATIENT
Start: 2017-01-01 | End: 2017-01-01

## 2017-01-01 RX ADMIN — ENOXAPARIN SODIUM 20 MG: 60 INJECTION SUBCUTANEOUS at 08:15

## 2017-01-01 RX ADMIN — Medication 10 ML: at 17:41

## 2017-01-01 RX ADMIN — POTASSIUM CHLORIDE 10 MEQ: 10 INJECTION, SOLUTION INTRAVENOUS at 22:10

## 2017-01-01 RX ADMIN — SODIUM CHLORIDE 75 ML/HR: 900 INJECTION, SOLUTION INTRAVENOUS at 12:50

## 2017-01-01 RX ADMIN — ENOXAPARIN SODIUM 111 MG: 120 INJECTION SUBCUTANEOUS at 05:53

## 2017-01-01 RX ADMIN — SODIUM CHLORIDE 125 ML/HR: 900 INJECTION, SOLUTION INTRAVENOUS at 21:40

## 2017-01-01 RX ADMIN — HEPARIN SODIUM 5000 UNITS: 5000 INJECTION, SOLUTION INTRAVENOUS; SUBCUTANEOUS at 01:33

## 2017-01-01 RX ADMIN — SODIUM CHLORIDE 250 ML: 900 INJECTION, SOLUTION INTRAVENOUS at 12:11

## 2017-01-01 RX ADMIN — IOPAMIDOL 100 ML: 755 INJECTION, SOLUTION INTRAVENOUS at 08:04

## 2017-01-01 RX ADMIN — ACETAMINOPHEN 650 MG: 325 TABLET, FILM COATED ORAL at 13:19

## 2017-01-01 RX ADMIN — CALCIUM CARBONATE 500 MG (1,250 MG)-VITAMIN D3 200 UNIT TABLET 1 TABLET: at 09:10

## 2017-01-01 RX ADMIN — TRAMADOL HYDROCHLORIDE 50 MG: 50 TABLET, FILM COATED ORAL at 10:20

## 2017-01-01 RX ADMIN — DONEPEZIL HYDROCHLORIDE 10 MG: 5 TABLET, FILM COATED ORAL at 21:58

## 2017-01-01 RX ADMIN — FAMOTIDINE 20 MG: 20 TABLET ORAL at 09:10

## 2017-01-01 RX ADMIN — ACETAMINOPHEN 650 MG: 325 TABLET, FILM COATED ORAL at 13:34

## 2017-01-01 RX ADMIN — Medication 10 ML: at 21:44

## 2017-01-01 RX ADMIN — FAMOTIDINE 20 MG: 20 TABLET ORAL at 18:10

## 2017-01-01 RX ADMIN — ACETAMINOPHEN 650 MG: 325 TABLET, FILM COATED ORAL at 13:23

## 2017-01-01 RX ADMIN — Medication 100 MCG: at 17:00

## 2017-01-01 RX ADMIN — ACETAMINOPHEN 650 MG: 325 TABLET, FILM COATED ORAL at 00:29

## 2017-01-01 RX ADMIN — SODIUM CHLORIDE, SODIUM LACTATE, POTASSIUM CHLORIDE, CALCIUM CHLORIDE: 600; 310; 30; 20 INJECTION, SOLUTION INTRAVENOUS at 16:19

## 2017-01-01 RX ADMIN — FENTANYL CITRATE 25 MCG: 50 INJECTION, SOLUTION INTRAMUSCULAR; INTRAVENOUS at 16:45

## 2017-01-01 RX ADMIN — OXYCODONE HYDROCHLORIDE 2.5 MG: 5 TABLET ORAL at 09:11

## 2017-01-01 RX ADMIN — DONEPEZIL HYDROCHLORIDE 10 MG: 5 TABLET, FILM COATED ORAL at 21:15

## 2017-01-01 RX ADMIN — TRAMADOL HYDROCHLORIDE 50 MG: 50 TABLET, FILM COATED ORAL at 17:00

## 2017-01-01 RX ADMIN — PROPOFOL 120 MG: 10 INJECTION, EMULSION INTRAVENOUS at 16:15

## 2017-01-01 RX ADMIN — ACETAMINOPHEN 500 MG: 500 TABLET, FILM COATED ORAL at 20:04

## 2017-01-01 RX ADMIN — DOCUSATE SODIUM AND SENNOSIDES 2 TABLET: 8.6; 5 TABLET, FILM COATED ORAL at 10:19

## 2017-01-01 RX ADMIN — ENOXAPARIN SODIUM 111 MG: 120 INJECTION SUBCUTANEOUS at 19:42

## 2017-01-01 RX ADMIN — FENTANYL CITRATE 50 MCG: 50 INJECTION, SOLUTION INTRAMUSCULAR; INTRAVENOUS at 16:24

## 2017-01-01 RX ADMIN — ACETAMINOPHEN 650 MG: 325 TABLET, FILM COATED ORAL at 05:37

## 2017-01-01 RX ADMIN — DOCUSATE SODIUM 100 MG: 100 CAPSULE, LIQUID FILLED ORAL at 17:34

## 2017-01-01 RX ADMIN — DOCUSATE SODIUM 100 MG: 100 CAPSULE, LIQUID FILLED ORAL at 10:19

## 2017-01-01 RX ADMIN — Medication 10 ML: at 13:15

## 2017-01-01 RX ADMIN — POLYETHYLENE GLYCOL 3350 17 G: 17 POWDER, FOR SOLUTION ORAL at 08:13

## 2017-01-01 RX ADMIN — ACETAMINOPHEN 650 MG: 325 TABLET, FILM COATED ORAL at 01:06

## 2017-01-01 RX ADMIN — OXYCODONE HYDROCHLORIDE 2.5 MG: 5 TABLET ORAL at 17:00

## 2017-01-01 RX ADMIN — CALCIUM CARBONATE 500 MG (1,250 MG)-VITAMIN D3 200 UNIT TABLET 1 TABLET: at 17:00

## 2017-01-01 RX ADMIN — LIDOCAINE HYDROCHLORIDE 200 MG: 20 INJECTION, SOLUTION INFILTRATION; PERINEURAL at 17:23

## 2017-01-01 RX ADMIN — ACETAMINOPHEN 650 MG: 325 TABLET, FILM COATED ORAL at 07:50

## 2017-01-01 RX ADMIN — SODIUM CHLORIDE 40 MG: 9 INJECTION INTRAMUSCULAR; INTRAVENOUS; SUBCUTANEOUS at 09:18

## 2017-01-01 RX ADMIN — Medication 10 ML: at 05:37

## 2017-01-01 RX ADMIN — POTASSIUM CHLORIDE, DEXTROSE MONOHYDRATE AND SODIUM CHLORIDE 75 ML/HR: 75; 5; 450 INJECTION, SOLUTION INTRAVENOUS at 18:37

## 2017-01-01 RX ADMIN — ACETAMINOPHEN 650 MG: 325 TABLET, FILM COATED ORAL at 06:10

## 2017-01-01 RX ADMIN — ROCURONIUM BROMIDE 10 MG: 10 INJECTION, SOLUTION INTRAVENOUS at 17:44

## 2017-01-01 RX ADMIN — CALCIUM CARBONATE 500 MG (1,250 MG)-VITAMIN D3 200 UNIT TABLET 1 TABLET: at 18:11

## 2017-01-01 RX ADMIN — CEFTRIAXONE 1 G: 1 INJECTION, POWDER, FOR SOLUTION INTRAMUSCULAR; INTRAVENOUS at 17:20

## 2017-01-01 RX ADMIN — DOCUSATE SODIUM AND SENNOSIDES 1 TABLET: 8.6; 5 TABLET, FILM COATED ORAL at 08:40

## 2017-01-01 RX ADMIN — Medication 200 MCG: at 16:26

## 2017-01-01 RX ADMIN — DONEPEZIL HYDROCHLORIDE 10 MG: 5 TABLET, FILM COATED ORAL at 21:17

## 2017-01-01 RX ADMIN — Medication 10 ML: at 21:19

## 2017-01-01 RX ADMIN — POTASSIUM CHLORIDE, DEXTROSE MONOHYDRATE AND SODIUM CHLORIDE 75 ML/HR: 75; 5; 450 INJECTION, SOLUTION INTRAVENOUS at 05:13

## 2017-01-01 RX ADMIN — CALCIUM CARBONATE 500 MG (1,250 MG)-VITAMIN D3 200 UNIT TABLET 1 TABLET: at 08:14

## 2017-01-01 RX ADMIN — Medication 10 ML: at 06:25

## 2017-01-01 RX ADMIN — POTASSIUM CHLORIDE, DEXTROSE MONOHYDRATE AND SODIUM CHLORIDE 75 ML/HR: 75; 5; 450 INJECTION, SOLUTION INTRAVENOUS at 07:09

## 2017-01-01 RX ADMIN — POTASSIUM CHLORIDE 10 MEQ: 10 INJECTION, SOLUTION INTRAVENOUS at 23:36

## 2017-01-01 RX ADMIN — DEXTROSE MONOHYDRATE AND SODIUM CHLORIDE 75 ML/HR: 5; .9 INJECTION, SOLUTION INTRAVENOUS at 07:17

## 2017-01-01 RX ADMIN — DEXTROSE MONOHYDRATE AND SODIUM CHLORIDE 75 ML/HR: 5; .9 INJECTION, SOLUTION INTRAVENOUS at 19:11

## 2017-01-01 RX ADMIN — FENTANYL CITRATE 25 MCG: 50 INJECTION, SOLUTION INTRAMUSCULAR; INTRAVENOUS at 15:07

## 2017-01-01 RX ADMIN — ROCURONIUM BROMIDE 30 MG: 10 INJECTION, SOLUTION INTRAVENOUS at 16:24

## 2017-01-01 RX ADMIN — OXYCODONE HYDROCHLORIDE 2.5 MG: 5 TABLET ORAL at 12:40

## 2017-01-01 RX ADMIN — CALCIUM CARBONATE 500 MG (1,250 MG)-VITAMIN D3 200 UNIT TABLET 1 TABLET: at 08:40

## 2017-01-01 RX ADMIN — ACETAMINOPHEN 650 MG: 325 TABLET, FILM COATED ORAL at 06:20

## 2017-01-01 RX ADMIN — DOCUSATE SODIUM AND SENNOSIDES 1 TABLET: 8.6; 5 TABLET, FILM COATED ORAL at 17:41

## 2017-01-01 RX ADMIN — DOCUSATE SODIUM 100 MG: 100 CAPSULE, LIQUID FILLED ORAL at 09:18

## 2017-01-01 RX ADMIN — SODIUM CHLORIDE 75 ML/HR: 900 INJECTION, SOLUTION INTRAVENOUS at 03:44

## 2017-01-01 RX ADMIN — CALCIUM CARBONATE 500 MG (1,250 MG)-VITAMIN D3 200 UNIT TABLET 1 TABLET: at 17:41

## 2017-01-01 RX ADMIN — DONEPEZIL HYDROCHLORIDE 10 MG: 5 TABLET, FILM COATED ORAL at 21:21

## 2017-01-01 RX ADMIN — Medication 10 ML: at 15:40

## 2017-01-01 RX ADMIN — Medication 10 ML: at 21:24

## 2017-01-01 RX ADMIN — Medication 100 MCG: at 17:02

## 2017-01-01 RX ADMIN — ACETAMINOPHEN 650 MG: 325 TABLET, FILM COATED ORAL at 17:00

## 2017-01-01 RX ADMIN — ENOXAPARIN SODIUM 20 MG: 60 INJECTION SUBCUTANEOUS at 09:11

## 2017-01-01 RX ADMIN — ACETAMINOPHEN 650 MG: 325 TABLET, FILM COATED ORAL at 17:41

## 2017-01-01 RX ADMIN — CALCIUM CARBONATE 500 MG (1,250 MG)-VITAMIN D3 200 UNIT TABLET 1 TABLET: at 09:11

## 2017-01-01 RX ADMIN — IOPAMIDOL 20 ML: 612 INJECTION, SOLUTION INTRAVENOUS at 17:25

## 2017-01-01 RX ADMIN — CALCIUM CARBONATE 500 MG (1,250 MG)-VITAMIN D3 200 UNIT TABLET 1 TABLET: at 17:32

## 2017-01-01 RX ADMIN — Medication 1 MG: at 23:50

## 2017-01-01 RX ADMIN — ACETAMINOPHEN 650 MG: 325 TABLET, FILM COATED ORAL at 17:32

## 2017-01-01 RX ADMIN — ONDANSETRON 2 MG: 2 INJECTION INTRAMUSCULAR; INTRAVENOUS at 06:29

## 2017-01-01 RX ADMIN — FENTANYL CITRATE 25 MCG: 50 INJECTION, SOLUTION INTRAMUSCULAR; INTRAVENOUS at 08:12

## 2017-01-01 RX ADMIN — CALCIUM CARBONATE 500 MG (1,250 MG)-VITAMIN D3 200 UNIT TABLET 1 TABLET: at 13:19

## 2017-01-01 RX ADMIN — ACETAMINOPHEN 650 MG: 325 TABLET, FILM COATED ORAL at 12:39

## 2017-01-01 RX ADMIN — DOCUSATE SODIUM 100 MG: 100 CAPSULE, LIQUID FILLED ORAL at 22:25

## 2017-01-01 RX ADMIN — Medication 10 ML: at 13:24

## 2017-01-01 RX ADMIN — FAMOTIDINE 20 MG: 20 TABLET ORAL at 17:41

## 2017-01-01 RX ADMIN — CEFTRIAXONE 1 G: 1 INJECTION, POWDER, FOR SOLUTION INTRAMUSCULAR; INTRAVENOUS at 18:11

## 2017-01-01 RX ADMIN — SODIUM CHLORIDE 75 ML/HR: 900 INJECTION, SOLUTION INTRAVENOUS at 15:05

## 2017-01-01 RX ADMIN — FAMOTIDINE 20 MG: 20 TABLET ORAL at 08:48

## 2017-01-01 RX ADMIN — HYDROMORPHONE HYDROCHLORIDE 0.2 MG: 2 INJECTION, SOLUTION INTRAMUSCULAR; INTRAVENOUS; SUBCUTANEOUS at 18:20

## 2017-01-01 RX ADMIN — TRAMADOL HYDROCHLORIDE 50 MG: 50 TABLET, FILM COATED ORAL at 21:36

## 2017-01-01 RX ADMIN — TRAMADOL HYDROCHLORIDE 50 MG: 50 TABLET, FILM COATED ORAL at 17:34

## 2017-01-01 RX ADMIN — ENOXAPARIN SODIUM 20 MG: 60 INJECTION SUBCUTANEOUS at 08:12

## 2017-01-01 RX ADMIN — Medication 10 ML: at 06:47

## 2017-01-01 RX ADMIN — ACETAMINOPHEN 650 MG: 325 TABLET, FILM COATED ORAL at 15:39

## 2017-01-01 RX ADMIN — Medication 10 ML: at 15:39

## 2017-01-01 RX ADMIN — MORPHINE SULFATE 1 MG: 4 INJECTION, SOLUTION INTRAMUSCULAR; INTRAVENOUS at 20:23

## 2017-01-01 RX ADMIN — CEFTRIAXONE 1 G: 1 INJECTION, POWDER, FOR SOLUTION INTRAMUSCULAR; INTRAVENOUS at 17:41

## 2017-01-01 RX ADMIN — ACETAMINOPHEN 650 MG: 325 TABLET, FILM COATED ORAL at 18:10

## 2017-01-01 RX ADMIN — CALCIUM CARBONATE 500 MG (1,250 MG)-VITAMIN D3 200 UNIT TABLET 1 TABLET: at 13:23

## 2017-01-01 RX ADMIN — FAMOTIDINE 20 MG: 20 TABLET ORAL at 08:14

## 2017-01-01 RX ADMIN — Medication 1 MG: at 19:35

## 2017-01-01 RX ADMIN — DOCUSATE SODIUM 100 MG: 100 CAPSULE, LIQUID FILLED ORAL at 08:15

## 2017-01-01 RX ADMIN — APIXABAN 10 MG: 5 TABLET, FILM COATED ORAL at 09:02

## 2017-01-01 RX ADMIN — DEXTROSE MONOHYDRATE AND SODIUM CHLORIDE 75 ML/HR: 5; .9 INJECTION, SOLUTION INTRAVENOUS at 01:28

## 2017-01-01 RX ADMIN — Medication 1 MG: at 17:04

## 2017-01-01 RX ADMIN — APIXABAN 10 MG: 5 TABLET, FILM COATED ORAL at 17:34

## 2017-01-01 RX ADMIN — DOCUSATE SODIUM AND SENNOSIDES 1 TABLET: 8.6; 5 TABLET, FILM COATED ORAL at 08:13

## 2017-01-01 RX ADMIN — MORPHINE SULFATE 1 MG: 4 INJECTION, SOLUTION INTRAMUSCULAR; INTRAVENOUS at 12:01

## 2017-01-01 RX ADMIN — Medication 10 ML: at 07:51

## 2017-01-01 RX ADMIN — ACETAMINOPHEN 650 MG: 325 TABLET, FILM COATED ORAL at 23:44

## 2017-01-01 RX ADMIN — OXYCODONE HYDROCHLORIDE 2.5 MG: 5 TABLET ORAL at 21:21

## 2017-01-01 RX ADMIN — OXYCODONE HYDROCHLORIDE 5 MG: 5 TABLET ORAL at 20:23

## 2017-01-01 RX ADMIN — DOCUSATE SODIUM 100 MG: 100 CAPSULE, LIQUID FILLED ORAL at 10:20

## 2017-01-01 RX ADMIN — CALCIUM CARBONATE 500 MG (1,250 MG)-VITAMIN D3 200 UNIT TABLET 1 TABLET: at 13:35

## 2017-01-01 RX ADMIN — DONEPEZIL HYDROCHLORIDE 5 MG: 5 TABLET, FILM COATED ORAL at 21:36

## 2017-01-01 RX ADMIN — LIDOCAINE HYDROCHLORIDE 60 MG: 20 INJECTION, SOLUTION EPIDURAL; INFILTRATION; INTRACAUDAL; PERINEURAL at 16:24

## 2017-01-01 RX ADMIN — SODIUM CHLORIDE 1000 ML: 900 INJECTION, SOLUTION INTRAVENOUS at 23:04

## 2017-01-01 RX ADMIN — OXYCODONE HYDROCHLORIDE 2.5 MG: 5 TABLET ORAL at 17:40

## 2017-01-01 RX ADMIN — DOCUSATE SODIUM AND SENNOSIDES 1 TABLET: 8.6; 5 TABLET, FILM COATED ORAL at 21:17

## 2017-01-01 RX ADMIN — CEFTRIAXONE 1 G: 1 INJECTION, POWDER, FOR SOLUTION INTRAMUSCULAR; INTRAVENOUS at 17:50

## 2017-01-01 RX ADMIN — Medication 10 ML: at 21:15

## 2017-01-01 RX ADMIN — NEOSTIGMINE METHYLSULFATE 2 MG: 1 INJECTION INTRAVENOUS at 18:16

## 2017-01-01 RX ADMIN — POTASSIUM CHLORIDE 10 MEQ: 10 INJECTION, SOLUTION INTRAVENOUS at 20:49

## 2017-01-01 RX ADMIN — OXYCODONE HYDROCHLORIDE 2.5 MG: 5 TABLET ORAL at 21:18

## 2017-01-01 RX ADMIN — FENTANYL CITRATE 25 MCG: 50 INJECTION, SOLUTION INTRAMUSCULAR; INTRAVENOUS at 17:52

## 2017-01-01 RX ADMIN — Medication 100 MCG: at 17:19

## 2017-01-01 RX ADMIN — DOCUSATE SODIUM 100 MG: 100 CAPSULE, LIQUID FILLED ORAL at 08:40

## 2017-01-01 RX ADMIN — ACETAMINOPHEN 500 MG: 500 TABLET, FILM COATED ORAL at 06:34

## 2017-01-01 RX ADMIN — SODIUM CHLORIDE 75 ML/HR: 900 INJECTION, SOLUTION INTRAVENOUS at 15:07

## 2017-01-01 RX ADMIN — ENOXAPARIN SODIUM 20 MG: 60 INJECTION SUBCUTANEOUS at 09:09

## 2017-01-01 RX ADMIN — FAMOTIDINE 20 MG: 20 TABLET ORAL at 09:11

## 2017-01-01 RX ADMIN — OXYCODONE HYDROCHLORIDE 5 MG: 5 TABLET ORAL at 07:09

## 2017-01-01 RX ADMIN — DONEPEZIL HYDROCHLORIDE 10 MG: 5 TABLET, FILM COATED ORAL at 21:19

## 2017-01-01 RX ADMIN — GLYCOPYRROLATE 0.3 MG: 0.2 INJECTION INTRAMUSCULAR; INTRAVENOUS at 18:16

## 2017-01-01 RX ADMIN — Medication 10 ML: at 06:11

## 2017-01-01 RX ADMIN — ACETAMINOPHEN 650 MG: 325 TABLET, FILM COATED ORAL at 20:19

## 2017-01-01 RX ADMIN — ACETAMINOPHEN 650 MG: 325 TABLET, FILM COATED ORAL at 23:47

## 2017-01-01 RX ADMIN — SODIUM CHLORIDE 40 MG: 9 INJECTION INTRAMUSCULAR; INTRAVENOUS; SUBCUTANEOUS at 08:59

## 2017-01-01 RX ADMIN — SODIUM CHLORIDE 100 ML: 900 INJECTION, SOLUTION INTRAVENOUS at 08:04

## 2017-01-01 RX ADMIN — Medication 10 ML: at 08:15

## 2017-01-01 RX ADMIN — ACETAMINOPHEN 1000 MG: 10 INJECTION, SOLUTION INTRAVENOUS at 17:51

## 2017-01-01 RX ADMIN — POTASSIUM CHLORIDE 10 MEQ: 10 INJECTION, SOLUTION INTRAVENOUS at 00:41

## 2017-01-01 RX ADMIN — CALCIUM CARBONATE 500 MG (1,250 MG)-VITAMIN D3 200 UNIT TABLET 1 TABLET: at 15:39

## 2017-01-01 RX ADMIN — SODIUM CHLORIDE 40 MG: 9 INJECTION INTRAMUSCULAR; INTRAVENOUS; SUBCUTANEOUS at 11:12

## 2017-01-01 RX ADMIN — SODIUM CHLORIDE 1000 ML/HR: 900 INJECTION, SOLUTION INTRAVENOUS at 01:29

## 2017-01-01 RX ADMIN — OXYCODONE HYDROCHLORIDE 2.5 MG: 5 TABLET ORAL at 08:14

## 2017-01-01 RX ADMIN — OXYCODONE HYDROCHLORIDE 5 MG: 5 TABLET ORAL at 10:19

## 2017-01-01 RX ADMIN — CEFAZOLIN SODIUM 1 G: 1 INJECTION, POWDER, FOR SOLUTION INTRAMUSCULAR; INTRAVENOUS at 08:39

## 2017-01-01 RX ADMIN — DONEPEZIL HYDROCHLORIDE 5 MG: 5 TABLET, FILM COATED ORAL at 21:02

## 2017-01-01 RX ADMIN — ACETAMINOPHEN 650 MG: 325 TABLET, FILM COATED ORAL at 08:14

## 2017-01-01 RX ADMIN — METOPROLOL SUCCINATE 25 MG: 25 TABLET, FILM COATED, EXTENDED RELEASE ORAL at 10:50

## 2017-01-01 RX ADMIN — ACETAMINOPHEN 650 MG: 325 TABLET, FILM COATED ORAL at 01:44

## 2017-01-01 RX ADMIN — Medication 10 ML: at 13:19

## 2017-01-01 RX ADMIN — SODIUM CHLORIDE 40 MG: 9 INJECTION INTRAMUSCULAR; INTRAVENOUS; SUBCUTANEOUS at 08:29

## 2017-01-01 RX ADMIN — DOCUSATE SODIUM 100 MG: 100 CAPSULE, LIQUID FILLED ORAL at 12:40

## 2017-01-01 RX ADMIN — DONEPEZIL HYDROCHLORIDE 10 MG: 5 TABLET, FILM COATED ORAL at 21:44

## 2017-01-01 RX ADMIN — DOCUSATE SODIUM 100 MG: 100 CAPSULE, LIQUID FILLED ORAL at 08:59

## 2017-01-01 RX ADMIN — FAMOTIDINE 20 MG: 20 TABLET ORAL at 17:32

## 2017-01-01 RX ADMIN — POLYETHYLENE GLYCOL 3350 17 G: 17 POWDER, FOR SOLUTION ORAL at 08:39

## 2017-01-01 RX ADMIN — PROPOFOL 20 MG: 10 INJECTION, EMULSION INTRAVENOUS at 18:44

## 2017-01-01 RX ADMIN — FAMOTIDINE 20 MG: 20 TABLET ORAL at 17:50

## 2017-01-01 RX ADMIN — CALCIUM CARBONATE 500 MG (1,250 MG)-VITAMIN D3 200 UNIT TABLET 1 TABLET: at 12:39

## 2017-01-01 RX ADMIN — CEFAZOLIN SODIUM 1 G: 1 INJECTION, POWDER, FOR SOLUTION INTRAMUSCULAR; INTRAVENOUS at 23:47

## 2017-01-01 RX ADMIN — ENOXAPARIN SODIUM 20 MG: 60 INJECTION SUBCUTANEOUS at 08:40

## 2017-01-01 RX ADMIN — DONEPEZIL HYDROCHLORIDE 5 MG: 5 TABLET, FILM COATED ORAL at 22:25

## 2017-01-01 RX ADMIN — INFLUENZA VIRUS VACCINE 0.5 ML: 15; 15; 15; 15 SUSPENSION INTRAMUSCULAR at 14:02

## 2017-01-01 RX ADMIN — SODIUM CHLORIDE 1000 ML: 900 INJECTION, SOLUTION INTRAVENOUS at 05:48

## 2017-01-01 RX ADMIN — Medication 10 ML: at 01:44

## 2017-01-01 RX ADMIN — ACETAMINOPHEN 650 MG: 325 TABLET, FILM COATED ORAL at 00:30

## 2017-01-01 RX ADMIN — ACETAMINOPHEN 650 MG: 325 TABLET, FILM COATED ORAL at 06:46

## 2017-01-01 RX ADMIN — Medication 2 G: at 16:25

## 2017-01-01 RX ADMIN — DONEPEZIL HYDROCHLORIDE 5 MG: 5 TABLET, FILM COATED ORAL at 22:27

## 2017-01-01 RX ADMIN — Medication 10 ML: at 13:38

## 2017-01-01 RX ADMIN — Medication 200 MCG: at 16:28

## 2017-01-01 RX ADMIN — ONDANSETRON HYDROCHLORIDE 4 MG: 2 INJECTION, SOLUTION INTRAVENOUS at 17:12

## 2017-01-01 RX ADMIN — SODIUM CHLORIDE 75 ML/HR: 900 INJECTION, SOLUTION INTRAVENOUS at 20:12

## 2017-01-01 RX ADMIN — Medication 10 ML: at 08:04

## 2017-01-01 RX ADMIN — POTASSIUM CHLORIDE, DEXTROSE MONOHYDRATE AND SODIUM CHLORIDE 75 ML/HR: 75; 5; 450 INJECTION, SOLUTION INTRAVENOUS at 09:09

## 2017-01-19 PROBLEM — F03.90 DEMENTIA (HCC): Chronic | Status: ACTIVE | Noted: 2017-01-01

## 2017-01-19 PROBLEM — R19.7 NAUSEA VOMITING AND DIARRHEA: Status: ACTIVE | Noted: 2017-01-01

## 2017-01-19 PROBLEM — R11.2 NAUSEA VOMITING AND DIARRHEA: Status: ACTIVE | Noted: 2017-01-01

## 2017-01-19 NOTE — IP AVS SNAPSHOT
Current Discharge Medication List  
  
Take these medications at their scheduled times Dose & Instructions Dispensing Information Comments Morning Noon Evening Bedtime CENTRUM SILVER PO Your next dose is: Today, Tomorrow Other:  ____________ Dose:  1 Tab Take 1 Tab by mouth daily. Refills:  0  
     
   
   
   
  
 donepezil 5 mg tablet Commonly known as:  ARICEPT Your next dose is: Today, Tomorrow Other:  ____________ Dose:  5 mg Take 5 mg by mouth nightly. Refills:  0  
     
   
   
   
  
 ENSURE PO Your next dose is: Today, Tomorrow Other:  ____________ Take  by mouth three (3) times daily (with meals). Refills:  0  
     
   
   
   
  
 metoprolol succinate 25 mg XL tablet Commonly known as:  TOPROL-XL Your next dose is: Today, Tomorrow Other:  ____________ Dose:  25 mg Take 25 mg by mouth daily. Refills:  0

## 2017-01-19 NOTE — ED PROVIDER NOTES
HPI Comments: 80 y.o. female with past medical history significant for Alzheimer's, HTN, colon lipoma, colonic polyps who presents accompanied by niece with chief complaint of abdominal pain. Niece states patient woke up this morning with sudden onset of vomiting \"6 or 7 times,\" diarrhea \"at least 10 times,\" and mid abdominal pain. Niece states patient was hunched over from the pain and needing to vomit. Niece states patient was having simultaneous episodes of vomiting and diarrhea though she has not had any since being in the ED. Niece states patient's emesis and stool have all been nonbloody. Niece states patient was complaining of chills and was very pale at onset though her pallor has since improved. Niece states patient lives at home with her. Niece states she and her  were sick with similar sx 1 week ago. Per niece, patient is oriented to person only at baseline. Niece denies any fever. There are no other acute medical concerns at this time. Social hx: never smoker, no alcohol  PCP: George Liriano MD    Full history, physical exam, and ROS unable to be obtained due to:  dementia. Note written by Bull Jensen, as dictated by Madina Tyler MD 6:23 AM     The history is provided by a relative (niece). No  was used. Past Medical History:   Diagnosis Date    Alzheimer's dementia      Diagnosed while in rehab recovering from colon resection.  Chronic pain      lower back    History of colonic polyps      Tubular adenoma excised colonoscopically on 2016.  Hypertension     Lipoma of colon      Resected on 2016. Past Surgical History:   Procedure Laterality Date    Hx  section      Hx colonoscopy  2016     Dr. Gerry Corado.     Hx other surgical  2016     Reduction of intussusception, sigmoid colon resection, and creation of end colostomy and Osito's pouch to treat colonic obstruction secondary to colorectal intussusception of a sigmoid colon lipoma; Dr. Jackie Darnell. Family History:   Problem Relation Age of Onset    Cancer Sister      pancreatic    Anesth Problems Neg Hx        Social History     Social History    Marital status:      Spouse name: N/A    Number of children: N/A    Years of education: N/A     Occupational History    Not on file. Social History Main Topics    Smoking status: Never Smoker    Smokeless tobacco: Never Used    Alcohol use No    Drug use: No    Sexual activity: Not on file     Other Topics Concern    Not on file     Social History Narrative         ALLERGIES: Review of patient's allergies indicates no known allergies. Review of Systems   Unable to perform ROS: Dementia       Vitals:    01/19/17 0730 01/19/17 0745 01/19/17 0830 01/19/17 0900   BP: 109/52 115/53 116/42 112/58   Pulse:       Resp:       Temp:       SpO2: 97% 97% 100% 99%   Weight:       Height:                Physical Exam   Constitutional: She is oriented to person, place, and time. No distress. elderly   HENT:   Head: Normocephalic and atraumatic. Mouth/Throat: Oropharynx is clear and moist. No oropharyngeal exudate. Eyes: Conjunctivae are normal. Right eye exhibits no discharge. Left eye exhibits no discharge. No scleral icterus. Neck: Normal range of motion. Neck supple. Cardiovascular: Normal rate, regular rhythm and normal heart sounds. Exam reveals no gallop and no friction rub. No murmur heard. Pulmonary/Chest: Effort normal and breath sounds normal. No respiratory distress. She has no wheezes. She has no rales. Abdominal: Soft. Bowel sounds are normal. She exhibits no distension. There is tenderness (mild periumbilical). There is no guarding. Musculoskeletal: Normal range of motion. She exhibits no edema or tenderness. Lymphadenopathy:     She has no cervical adenopathy. Neurological: She is alert and oriented to person, place, and time. No cranial nerve deficit. Coordination normal.   Skin: Skin is warm and dry. No rash noted. No pallor. Nursing note and vitals reviewed. MDM  Number of Diagnoses or Management Options  Diagnosis management comments: 72-year-old female with a prior history of partial colectomy for T2: Lipoma and other medical problems presents today with nausea, vomiting, diarrhea. Emesis and diarrhea is not bloody. She complained earlier of mid abdominal pain. Patient is a poor historian given her dementia. She does have some mild periumbilical tenderness. Differential diagnosis includes viral gastroenteritis, enteric pathogens, partial obstruction, C. difficile, electrolytes and others. Check labs, give IV fluids, give Zofran, CT abdomen and pelvis for the abdominal pain given her past medical history. ED Course       Procedures    CONSULT NOTE:  10:18 AM Rhys Carmona MD spoke with Dr. Alfonso Esparza, Consult for Hospitalist.  Discussed available diagnostic tests and clinical findings. He is in agreement with care plans as outlined. Dr. Alfonso Esparza will admit for dehydration. Pt with elevated BUN, frail and clearly dehydrated on exam.  Would benefit from continued IVF hydration.

## 2017-01-19 NOTE — ED TRIAGE NOTES
Patient arriving for c/o N/V/D starting 0330. Pain is mid abdomen. No medications PTA. Patient AOx0, (alzheimers at baseline). Accompanied by daughter.

## 2017-01-19 NOTE — H&P
History & Physical  Clinical Observation Unit    Date of admission: 1/19/2017    Patient name: Dede Allen  MRN: [id number]  YOB: 1935  Age: 80 y.o. Primary care provider:  Laurence Keller MD     Source of Information: patient, medical records and family                                  Chief complain: n/v/d    History of present illness  Dede Allen is a 80 y.o. female with pmh of dementia, hypertension, colon  resection from sigmoid lipoma with colostomy take down and resection done by Dr Jason Hackett on 6/14/216 who presented with n/v/d. Patient has history of dementia and at baseline is only oriented to person and place and is a limited historian. Per nephew's wife who the patient lives with this morning patient has acute onset of n/v/d. Pt reportly woke up this morning with vomiting \"6 or 7 times\" with diarrhea \"at least 10\". During episodes patient did complain of abdominal pain however it was relieved by the v/d. Family denies any blood in the stool. Pt reportingly had [place] food last night however family had the same thing with no current sxs. Niece reports that her and her  had the stomach bug last week and their daughter has had diarrhea for the last few days. Denies any chills or fevers, difficulty with urination, chest pain, abdominal pain, blood in the stool or emesis, or any other acute sxs. Ct of the abdomen in ED showed no acute findings. Per ED staff, patient has had no recurrent sxs since arrival to the ED. Patient admitted for observation. Past Medical History   Diagnosis Date    Alzheimer's dementia      Diagnosed while in rehab recovering from colon resection.  Chronic pain      lower back    History of colonic polyps      Tubular adenoma excised colonoscopically on 4/20/2016.  Hypertension     Lipoma of colon      Resected on 1/5/2016.       Past Surgical History Procedure Laterality Date    Hx  section      Hx colonoscopy  2016     Dr. Mortimer Capes.  Hx other surgical  2016     Reduction of intussusception, sigmoid colon resection, and creation of end colostomy and Osito's pouch to treat colonic obstruction secondary to colorectal intussusception of a sigmoid colon lipoma; Dr. Mortimer Capes. Prior to Admission medications    Medication Sig Start Date End Date Taking? Authorizing Provider   metoprolol succinate (TOPROL-XL) 25 mg XL tablet Take 25 mg by mouth daily. Yes Historical Provider   FOLIC ACID/MULTIVIT-MIN/LUTEIN (CENTRUM SILVER PO) Take 1 Tab by mouth daily. Yes Historical Provider   donepezil (ARICEPT) 5 mg tablet Take 5 mg by mouth nightly. Yes Historical Provider   LACTOSE-REDUCED FOOD (ENSURE PO) Take  by mouth three (3) times daily (with meals). Yes Phys Other, MD     No Known Allergies   Family History   Problem Relation Age of Onset    Cancer Sister      pancreatic    Anesth Problems Neg Hx       Family history reviewed and non-contributory. Social history  Patient resides    Independently    X  With family care- lives with nephew and his wife     Assisted living      SNF    Ambulates  X  Independently      With cane       Assisted walker         Alcohol history     None     Social     Chronic   Smoking history    None     Former smoker     Current smoker       Code status  X  Full code     DNR/DNI        Code status discussed with the patient/caregivers. Prior    Review of systems  The patient unable to provide ROS d/t orientation states     Per family pt has denied any fever, chills, chest pain, cough, congestion, recent illness, palpitations, or dysuria. Gastrointestinal: positive for nausea, vomiting, diarrhea and abdominal pain   The remainder of the review of systems was reviewed and is noncontributory.     Physical Examination   Visit Vitals    /58    Pulse 92    Temp 98 °F (36.7 °C)    Resp 16  Ht 5' (1.524 m)    Wt 45.8 kg (101 lb)    SpO2 99%    BMI 19.73 kg/m2          O2 Device: Room air    General:  Thin elderly female, Alert, cooperative, no distress   Head:  Normocephalic, without obvious abnormality, atraumatic   Eyes:  Conjunctivae/corneas clear. PERRL, EOMs intact   E/N/M/T: Nares normal. Septum midline. No nasal drainage or sinus tenderness  Lips, mucosa, and tongue normal   Teeth and gums normal  Clear oropharynx   Neck: Normal appearance and movements, symmetrical, trachea midline  No palpable adenopathy  No thyroid enlargement, tenderness or nodules  No carotid bruit   Normal JVP   Lungs:   Symmetrical chest expansion and respiratory effort  Clear to auscultation bilaterally   Chest wall:  No tenderness or deformity   Heart:  Regular rhythm   Sounds normal; no murmur, click, rub or gallop   Abdomen:   Soft, no tenderness  Bowel sounds normal  No masses or hepatosplenomegaly.  Old scarring  No hernias present   Back: No CVA tenderness   Extremities: Extremities normal, atraumatic  No cyanosis or edema  No DVT signs   Pulses 2+ and symmetric all extremities   Skin: No rashes or ulcers   Musculo-      skeletal: Gait not tested  Normal symmetry, ROM, strength and tone   Neuro: Normal cranial nerves  Normal reflexes and sensation   Psych: Alert, oriented to person and place, disoriented to time (per family this is baseline)  Normal affect         Data Review    24 Hour Results:  Recent Results (from the past 24 hour(s))   CBC WITH AUTOMATED DIFF    Collection Time: 01/19/17  5:49 AM   Result Value Ref Range    WBC 12.7 (H) 3.6 - 11.0 K/uL    RBC 4.26 3.80 - 5.20 M/uL    HGB 13.3 11.5 - 16.0 g/dL    HCT 41.5 35.0 - 47.0 %    MCV 97.4 80.0 - 99.0 FL    MCH 31.2 26.0 - 34.0 PG    MCHC 32.0 30.0 - 36.5 g/dL    RDW 14.9 (H) 11.5 - 14.5 %    PLATELET 625 781 - 994 K/uL    NEUTROPHILS 89 (H) 32 - 75 %    BANDS 9 (H) 0 - 6 %    LYMPHOCYTES 1 (L) 12 - 49 %    MONOCYTES 1 (L) 5 - 13 %    EOSINOPHILS 0 0 - 7 %    BASOPHILS 0 0 - 1 %    ABS. NEUTROPHILS 12.5 (H) 1.8 - 8.0 K/UL    ABS. LYMPHOCYTES 0.1 (L) 0.8 - 3.5 K/UL    ABS. MONOCYTES 0.1 0.0 - 1.0 K/UL    ABS. EOSINOPHILS 0.0 0.0 - 0.4 K/UL    ABS. BASOPHILS 0.0 0.0 - 0.1 K/UL    DF MANUAL      RBC COMMENTS ANISOCYTOSIS  1+       METABOLIC PANEL, COMPREHENSIVE    Collection Time: 01/19/17  5:49 AM   Result Value Ref Range    Sodium 146 (H) 136 - 145 mmol/L    Potassium 3.4 (L) 3.5 - 5.1 mmol/L    Chloride 106 97 - 108 mmol/L    CO2 30 21 - 32 mmol/L    Anion gap 10 5 - 15 mmol/L    Glucose 125 (H) 65 - 100 mg/dL    BUN 41 (H) 6 - 20 MG/DL    Creatinine 0.99 0.55 - 1.02 MG/DL    BUN/Creatinine ratio 41 (H) 12 - 20      GFR est AA >60 >60 ml/min/1.73m2    GFR est non-AA 54 (L) >60 ml/min/1.73m2    Calcium 9.5 8.5 - 10.1 MG/DL    Bilirubin, total 0.7 0.2 - 1.0 MG/DL    ALT 25 12 - 78 U/L    AST 24 15 - 37 U/L    Alk.  phosphatase 93 45 - 117 U/L    Protein, total 7.8 6.4 - 8.2 g/dL    Albumin 4.0 3.5 - 5.0 g/dL    Globulin 3.8 2.0 - 4.0 g/dL    A-G Ratio 1.1 1.1 - 2.2     LIPASE    Collection Time: 01/19/17  5:49 AM   Result Value Ref Range    Lipase 184 73 - 393 U/L   URINALYSIS W/ REFLEX CULTURE    Collection Time: 01/19/17  8:24 AM   Result Value Ref Range    Color YELLOW/STRAW      Appearance CLOUDY (A) CLEAR      Specific gravity >1.030 (H) 1.003 - 1.030    pH (UA) 5.0 5.0 - 8.0      Protein 30 (A) NEG mg/dL    Glucose NEGATIVE  NEG mg/dL    Ketone NEGATIVE  NEG mg/dL    Blood NEGATIVE  NEG      Urobilinogen 0.2 0.2 - 1.0 EU/dL    Nitrites NEGATIVE  NEG      Leukocyte Esterase NEGATIVE  NEG      Bilirubin UA, confirm NEGATIVE  NEG      WBC 0-4 0 - 4 /hpf    RBC 0-5 0 - 5 /hpf    Epithelial cells FEW FEW /lpf    Bacteria 4+ (A) NEG /hpf    UA:UC IF INDICATED URINE CULTURE ORDERED (A) CNI      Mucus 3+ (A) NEG /lpf    Hyaline Cast 5-10 0 - 5 /lpf    Granular Cast 0-2 (A) NEG /lpf    Epithelial Cast 0-2 (A) NEG /lpf     Recent Labs      01/19/17   0549   WBC 12.7*   HGB  13.3   HCT  41.5   PLT  244     Recent Labs      01/19/17   0549   NA  146*   K  3.4*   CL  106   CO2  30   GLU  125*   BUN  41*   CREA  0.99   CA  9.5   ALB  4.0   TBILI  0.7   SGOT  24   ALT  25       Imaging  TECHNIQUE:   Following the uneventful intravenous administration of 100 cc Isovue-370, thin  axial images were obtained through the abdomen and pelvis. Coronal and sagittal  reconstructions were generated. Oral contrast was not administered. CT dose  reduction was achieved through use of a standardized protocol tailored for this  examination and automatic exposure control for dose modulation.     FINDINGS:   LUNG BASES: Clear. INCIDENTALLY IMAGED HEART AND MEDIASTINUM: Unremarkable. LIVER: No mass or biliary dilatation. GALLBLADDER: There is a gallstone. SPLEEN: No mass. PANCREAS: No mass or ductal dilatation. ADRENALS: Unremarkable. KIDNEYS: No mass, , or hydronephrosis. There are few tiny low densities in the  kidneys most likely subcentimeter cyst. There is a tiny nonobstructing calculus  left kidney  STOMACH: Unremarkable. SMALL BOWEL: No dilatation or wall thickening. COLON: Patient status post sigmoid colectomy. APPENDIX: Is not identified. A distended appendix is not visualized. PERITONEUM: No ascites or pneumoperitoneum. RETROPERITONEUM: No lymphadenopathy or aortic aneurysm. REPRODUCTIVE ORGANS:  URINARY BLADDER: No mass or calculus. BONES: No destructive bone lesion. There is a left rib fracture. ADDITIONAL COMMENTS: N/A     IMPRESSION  IMPRESSION:  No acute abnormality is identified. Assessment and Plan   1. N/V/D likely gastroenteritis  -admit for observation   -sxs currently resolved   -gentle hydrate   -send stool for cdiff, culture, and ova and parasites   -zofran prn  -likely am discharge. 2. Hypertension-POA   -continue home Metoprolol  -monitor BP     3.  Dementia   -continue home meds   -supportive care  -consult PT   -consult CM- pt rakesh Kahn Kristopher Polk at [phone]) asking about power of /medical proxy information.       Diet: Clear liquid advance as tolerated  Activity: up with assistance  Consultations: none  Anticipated disposition: Appropriate for COU observation       Signed by: Jamas Alpers, NP    January 19, 2017 at 10:49 AM

## 2017-01-19 NOTE — ED NOTES
Pt and niece counseled on the need for a urine and stool specimen. Adult briefs checked to see if fecal occult test could be done; briefs were not soiled. Instructed patient to use call bell if she feels the need to use the restroom. Specimen containers and fecal occult test in pt's room.

## 2017-01-19 NOTE — PROGRESS NOTES
Admission Medication Reconciliation:    Information obtained from: Family member (niece) and Rx Query    Significant PMH/Disease States:   Past Medical History   Diagnosis Date    Alzheimer's dementia      Diagnosed while in rehab recovering from colon resection.  Chronic pain      lower back    History of colonic polyps      Tubular adenoma excised colonoscopically on 4/20/2016.  Hypertension     Lipoma of colon      Resected on 1/5/2016. Chief Complaint for this Admission:    Chief Complaint   Patient presents with    Abdominal Pain         Allergies:  Review of patient's allergies indicates no known allergies. Prior to Admission Medications:   Prior to Admission Medications   Prescriptions Last Dose Informant Patient Reported? Taking? FOLIC ACID/MULTIVIT-MIN/LUTEIN (CENTRUM SILVER PO) 1/18/2017 at Unknown time  Yes Yes   Sig: Take 1 Tab by mouth daily. LACTOSE-REDUCED FOOD (ENSURE PO) 1/18/2017 at Unknown time  Yes Yes   Sig: Take  by mouth three (3) times daily (with meals). donepezil (ARICEPT) 5 mg tablet 1/18/2017 at Unknown time  Yes Yes   Sig: Take 5 mg by mouth nightly. metoprolol succinate (TOPROL-XL) 25 mg XL tablet 1/18/2017 at Unknown time  Yes Yes   Sig: Take 25 mg by mouth daily. Facility-Administered Medications: None         Comments/Recommendations: Updated PTA meds and confirmed NKDA. 1. Updated directions on metoprolol  2.  Removed oxycodone

## 2017-01-19 NOTE — ED NOTES
Patient returned from CT and placed back on monitor x2. Call bell within reach and family at bedside. No further needs at this time. 1054: Patient assisted on and off the bedpan.

## 2017-01-19 NOTE — IP AVS SNAPSHOT
[address] [facility] [address] [place] [address] 
[phone] Patient: Aline De La Fuente MRN: [id number] ZPN:[id number]/50/9445 You are allergic to the following No active allergies Immunizations Administered for This Admission Name Date Influenza Vaccine (Quad) PF 1/20/2017 Recent Documentation Height Weight Breastfeeding? BMI OB Status Smoking Status 1.524 m 45.8 kg No 19.73 kg/m2 Menopause Never Smoker Emergency Contacts Name Discharge Info Relation Home Work Mobile Franchesca Kilpatrick DISCHARGE CAREGIVER [3] Other Relative [6]   [phone] Abdirashid Dallas  Other Relative [6]   [phone] About your hospitalization You were admitted on:  January 19, 2017 You last received care in the:  [address] You were discharged on:  January 20, 2017 Unit phone number:  [phone] Why you were hospitalized Your primary diagnosis was:  Nausea Vomiting And Diarrhea Your diagnoses also included:  Dementia Providers Seen During Your Hospitalizations Provider Role Specialty Primary office phone Madina Tyler MD Attending Provider Emergency Medicine [phone] Amalia Llamas MD Attending Provider Internal Medicine [phone] Cyril Avina MD Attending Provider Internal Medicine [phone] Your Primary Care Physician (PCP) Primary Care Physician Office Phone Office Fax Laurent Call [phone] Follow-up Information Follow up With Details Comments Contact Info George Liriano MD In 1 week Hospital follow up 2301 [facility] [address] 
[phone] Current Discharge Medication List  
  
CONTINUE these medications which have NOT CHANGED Dose & Instructions Dispensing Information Comments Morning Noon Evening Bedtime  CENTRUM SILVER PO  
   
 Your next dose is: Today, Tomorrow Other:  _________ Dose:  1 Tab Take 1 Tab by mouth daily. Refills:  0  
     
   
   
   
  
 donepezil 5 mg tablet Commonly known as:  ARICEPT Your next dose is: Today, Tomorrow Other:  _________ Dose:  5 mg Take 5 mg by mouth nightly. Refills:  0  
     
   
   
   
  
 ENSURE PO Your next dose is: Today, Tomorrow Other:  _________ Take  by mouth three (3) times daily (with meals). Refills:  0  
     
   
   
   
  
 metoprolol succinate 25 mg XL tablet Commonly known as:  TOPROL-XL Your next dose is: Today, Tomorrow Other:  _________ Dose:  25 mg Take 25 mg by mouth daily. Refills:  0 Discharge Instructions Discharge Instructions PATIENT ID: Rosa Chong MRN: [id number] YOB: 1935 DATE OF ADMISSION: 1/19/2017  5:34 AM   
DATE OF DISCHARGE: 1/20/2017 PRIMARY CARE PROVIDER: Ward Reynoso MD  
 
ATTENDING PHYSICIAN: Rafael Gomez MD 
DISCHARGING PROVIDER: Pat Ramos NP To contact this individual call [phone] and ask the  to page. If unavailable ask to be transferred the Adult Hospitalist Department. DISCHARGE DIAGNOSES Gastroenteritis CONSULTATIONS: IP CONSULT TO HOSPITALIST 
 
PROCEDURES/SURGERIES: * No surgery found * PENDING TEST RESULTS:  
At the time of discharge the following test results are still pending: none FOLLOW UP APPOINTMENTS:  
Follow-up Information Follow up With Details Comments Contact Los Reynoso MD In 1 week Hospital follow up 2301 [facility] [address] [place] [address] 
[phone] ADDITIONAL CARE RECOMMENDATIONS:  
1. Continue your home medications as prescribed. 2. Follow up with your primary care provider within 1 week. DIET: Gi lite diet. Drink plenty of fluid ACTIVITY: as tolerated WOUND CARE: none EQUIPMENT needed: none DISCHARGE MEDICATIONS: 
 See Medication Reconciliation Form · It is important that you take the medication exactly as they are prescribed. · Keep your medication in the bottles provided by the pharmacist and keep a list of the medication names, dosages, and times to be taken in your wallet. · Do not take other medications without consulting your doctor. NOTIFY YOUR PHYSICIAN FOR ANY OF THE FOLLOWING:  
Fever over 101 degrees for 24 hours. Chest pain, shortness of breath, fever, chills, nausea, vomiting, diarrhea, change in mentation, falling, weakness, bleeding. Severe pain or pain not relieved by medications. Or, any other signs or symptoms that you may have questions about. DISPOSITION: 
 X Home With: 
 OT  PT  [facility]  RN  
  
 SNF/Inpatient Rehab/LTAC Independent/assisted living Hospice Other: CDMP Checked: Yes X PROBLEM LIST Updated: Yes X Signed:  
Sherren Craven, NP 
1/20/2017 
8:27 AM 
 
 
Discharge Instructions Attachments/References GASTROENTERITIS (ENGLISH) Discharge Orders None [employer] Announcement We are excited to announce that we are making your provider's discharge notes available to you in Wandrian. You will see these notes when they are completed and signed by the physician that discharged you from your recent hospital stay. If you have any questions or concerns about any information you see in Wandrian, please call the Health Information Department where you were seen or reach out to your Primary Care Provider for more information about your plan of care. Introducing [facility]! Татьяна Alvarado introduces Wandrian patient portal. Now you can access parts of your medical record, email your doctor's office, and request medication refills online. 1. In your internet browser, go to [email or web address] 2. Click on the First Time User? Click Here link in the Sign In box. You will see the New Member Sign Up page. 3. Enter your Top Rops Access Code exactly as it appears below. You will not need to use this code after youve completed the sign-up process. If you do not sign up before the expiration date, you must request a new code. · Top Rops Access Code: [email or web address] Expires: 4/19/2017  6:27 AM 
 
4. Enter the last four digits of your Social Security Number (xxxx) and Date of Birth (mm/dd/yyyy) as indicated and click Submit. You will be taken to the next sign-up page. 5. Create a Top Rops ID. This will be your Top Rops login ID and cannot be changed, so think of one that is secure and easy to remember. 6. Create a Top Rops password. You can change your password at any time. 7. Enter your Password Reset Question and Answer. This can be used at a later time if you forget your password. 8. Enter your e-mail address. You will receive e-mail notification when new information is available in [address] 9. Click Sign Up. You can now view and download portions of your medical record. 10. Click the Download Summary menu link to download a portable copy of your medical information. If you have questions, please visit the Frequently Asked Questions section of the Top Rops website. Remember, [employer]ops is NOT to be used for urgent needs. For medical emergencies, dial 911. Now available from your iPhone and Android! General Information Please provide this summary of care documentation to your next provider. Patient Signature:  ____________________________________________________________ Date:  ____________________________________________________________  
  
Zulma Lewisck Provider Signature:  ____________________________________________________________ Date:  ____________________________________________________________ More Information Gastroenteritis: Care Instructions Your Care Instructions Gastroenteritis is an illness that may cause nausea, vomiting, and diarrhea. It is sometimes called \"stomach flu. \" It can be caused by bacteria or a virus. You will probably begin to feel better in 1 to 2 days. In the meantime, get plenty of rest and make sure you do not become dehydrated. Dehydration occurs when your body loses too much fluid. Follow-up care is a key part of your treatment and safety. Be sure to make and go to all appointments, and call your doctor if you are having problems. Its also a good idea to know your test results and keep a list of the medicines you take. How can you care for yourself at home? · If your doctor prescribed antibiotics, take them as directed. Do not stop taking them just because you feel better. You need to take the full course of antibiotics. · Drink plenty of fluids to prevent dehydration, enough so that your urine is light yellow or clear like water. Choose water and other caffeine-free clear liquids until you feel better. If you have kidney, heart, or liver disease and have to limit fluids, talk with your doctor before you increase your fluid intake. · Drink fluids slowly, in frequent, small amounts, because drinking too much too fast can cause vomiting. · Begin eating mild foods, such as dry toast, yogurt, applesauce, bananas, and rice. Avoid spicy, hot, or high-fat foods, and do not drink alcohol or caffeine for a day or two. Do not drink milk or eat ice cream until you are feeling better. How to prevent gastroenteritis · Keep hot foods hot and cold foods cold. · Do not eat meats, dressings, salads, or other foods that have been kept at room temperature for more than 2 hours. · Use a thermometer to check your refrigerator. It should be between 34°F and 40°F. 
· Defrost meats in the refrigerator or microwave, not on the kitchen counter. · Keep your hands and your kitchen clean. Wash your hands, cutting boards, and countertops with hot soapy water frequently. · Cook meat until it is well done. · Do not eat raw eggs or uncooked sauces made with raw eggs. · Do not take chances. If food looks or tastes spoiled, throw it out. When should you call for help? Call 911 anytime you think you may need emergency care. For example, call if: 
· You vomit blood or what looks like coffee grounds. · You passed out (lost consciousness). · You pass maroon or very bloody stools. Call your doctor now or seek immediate medical care if: 
· You have severe belly pain. · You have signs of needing more fluids. You have sunken eyes, a dry mouth, and pass only a little dark urine. · You feel like you are going to faint. · You have increased belly pain that does not go away in 1 to 2 days. · You have new or increased nausea, or you are vomiting. · You have a new or higher fever. · Your stools are black and tarlike or have streaks of blood. Watch closely for changes in your health, and be sure to contact your doctor if: 
· You are dizzy or lightheaded. · You urinate less than usual, or your urine is dark yellow or brown. · You do not feel better with each day that goes by. Where can you learn more? Go to http://eros-isidro.info/. Enter N142 in the search box to learn more about \"Gastroenteritis: Care Instructions. \" Current as of: May 24, 2016 Content Version: 11.1 © 1770-3644 [employer]. Care instructions adapted under license by [employer] (which disclaims liability or warranty for this information). If you have questions about a medical condition or this instruction, always ask your healthcare professional. [address] any warranty or liability for your use of this information.

## 2017-01-19 NOTE — ROUTINE PROCESS
TRANSFER - OUT REPORT:    Verbal report given to [facility] RN(name) on [employer]  being transferred to Room 350(unit) for routine progression of care       Report consisted of patients Situation, Background, Assessment and   Recommendations(SBAR). Information from the following report(s) SBAR, Kardex, ED Summary, [facility] and Recent Results was reviewed with the receiving nurse. Lines:   Peripheral IV 01/19/17 Left Antecubital (Active)   Site Assessment Clean, dry, & intact 1/19/2017  6:01 AM   Phlebitis Assessment 0 1/19/2017  6:01 AM   Infiltration Assessment 0 1/19/2017  6:01 AM   Dressing Status Clean, dry, & intact 1/19/2017  6:01 AM   Dressing Type Transparent 1/19/2017  6:01 AM   Hub Color/Line Status Pink 1/19/2017  6:01 AM   Action Taken Catheter retaped 1/19/2017  6:01 AM        Opportunity for questions and clarification was provided.       Patient transported with:   [employer]

## 2017-01-20 NOTE — PROGRESS NOTES
Cm reviewed chart and received consult that family was asking about becoming POA. Pt lives with her nephew and his wife, cm spoke with them and explained that pt must be oriented to appoint and signed [facility] paperwork. At this time, due to pt's confusion, family would have to get a  and go through the courts to get guardianship or conservator if needed. CM explained order of designated healthcare proxy. Family expressed understanding. Pt is being discharged home today, family confirmed that they would provide transportation home.   Sandra Larios, BELINDAW, ACM

## 2017-01-20 NOTE — PROGRESS NOTES
Called Cj Mcnulty, patient's family about the time frame that they will  the patient. Stated \"trying to get there,\" and also mentioned nursing that she already spoke to the case management this morning. Notified Cj Mcnulty that patient will be ready by noon.

## 2017-01-20 NOTE — PROGRESS NOTES
Discharge instructions discussed with patient's son. Verbalized understanding. Opportunity to ask questions given. Signature obtained. Copies given.

## 2017-01-20 NOTE — DISCHARGE INSTRUCTIONS
Discharge Instructions       PATIENT ID: Gregory Mejía  MRN: [id number]   YOB: 1935    DATE OF ADMISSION: 1/19/2017  5:34 AM    DATE OF DISCHARGE: 1/20/2017    PRIMARY CARE PROVIDER: Donny Magaña MD     ATTENDING PHYSICIAN: Katherine Etienne MD  DISCHARGING PROVIDER: Tahir Pandey NP    To contact this individual call [phone] and ask the  to page. If unavailable ask to be transferred the Adult Hospitalist Department. DISCHARGE DIAGNOSES Gastroenteritis     CONSULTATIONS: IP CONSULT TO HOSPITALIST    PROCEDURES/SURGERIES: * No surgery found *    PENDING TEST RESULTS:   At the time of discharge the following test results are still pending: none    FOLLOW UP APPOINTMENTS:   Follow-up Information     Follow up With Details Comments [address] Melendez MD In 1 week Hospital follow up [facility] [facility]  [phone]             ADDITIONAL CARE RECOMMENDATIONS:   1. Continue your home medications as prescribed. 2. Follow up with your primary care provider within 1 week. DIET: Gi lite diet. Drink plenty of fluid    ACTIVITY: as tolerated    WOUND CARE: none    EQUIPMENT needed: none    DISCHARGE MEDICATIONS:   See Medication Reconciliation Form    · It is important that you take the medication exactly as they are prescribed. · Keep your medication in the bottles provided by the pharmacist and keep a list of the medication names, dosages, and times to be taken in your wallet. · Do not take other medications without consulting your doctor. NOTIFY YOUR PHYSICIAN FOR ANY OF THE FOLLOWING:   Fever over 101 degrees for 24 hours. Chest pain, shortness of breath, fever, chills, nausea, vomiting, diarrhea, change in mentation, falling, weakness, bleeding. Severe pain or pain not relieved by medications. Or, any other signs or symptoms that you may have questions about.       DISPOSITION:   X Home With:   OT  PT  [facility]  RN SNF/Inpatient Rehab/LTAC    Independent/assisted living    Hospice    Other:     CDMP Checked: Yes X     PROBLEM LIST Updated:   Yes X       Signed:   Tahir Pandey NP  1/20/2017  8:27 AM

## 2017-04-17 PROBLEM — S72.142A CLOSED INTERTROCHANTERIC FRACTURE OF LEFT FEMUR (HCC): Status: ACTIVE | Noted: 2017-01-01

## 2017-04-17 NOTE — ED TRIAGE NOTES
Pt nephew reports pt has had left leg swelling for the past week. Pt has dementia and had had several falls with last one being 2 seeks ago.   Nephew also reports pt has been passing out a lot and has been told she needs to see a cardiologist.

## 2017-04-17 NOTE — ED PROVIDER NOTES
Patient is a 80 y.o. female presenting with leg pain, foot pain, and swelling. Leg Pain      Foot Pain      Swelling          81y F with hx of dementia and HTN here with LLE swelling, inability to walk, and syncope. Family reports that over the past 2-3 weeks, she's had episodes where she just passed out and falls down. Has seen her neurologist for this who advised she see a cardiologist. Family also reports that for the past week her LLE has been swollen and she is no longer able to put weight on the LLE. Pt doesn't have any complaints but doesn't usually say much due to underlying dementia. Past Medical History:   Diagnosis Date    Alzheimer's dementia     Diagnosed while in rehab recovering from colon resection.  Chronic pain     lower back    History of colonic polyps     Tubular adenoma excised colonoscopically on 2016.  Hypertension     Lipoma of colon     Resected on 2016. Past Surgical History:   Procedure Laterality Date    HX  SECTION      HX COLONOSCOPY  2016    Dr. Cynthia Roy.  HX OTHER SURGICAL  2016    Reduction of intussusception, sigmoid colon resection, and creation of end colostomy and Osito's pouch to treat colonic obstruction secondary to colorectal intussusception of a sigmoid colon lipoma; Dr. Cynthia Roy. Family History:   Problem Relation Age of Onset    Cancer Sister      pancreatic    Anesth Problems Neg Hx        Social History     Social History    Marital status:      Spouse name: N/A    Number of children: N/A    Years of education: N/A     Occupational History    Not on file. Social History Main Topics    Smoking status: Never Smoker    Smokeless tobacco: Never Used    Alcohol use No    Drug use: No    Sexual activity: Not on file     Other Topics Concern    Not on file     Social History Narrative         ALLERGIES: Review of patient's allergies indicates no known allergies.     Review of Systems  See hpi, otherwise unable to obtain due to pt's dementia    Vitals:    04/17/17 1540   BP: 95/51   Pulse: 96   Resp: 16   Temp: 97.5 °F (36.4 °C)   SpO2: 100%   Weight: 4.536 kg (10 lb)   Height: 5' (1.524 m)            Physical Exam Nursing note and vitals reviewed. Constitutional: demented (baseline) appears elderly and frail. No distress. Head: Normocephalic and atraumatic. Sclera anicteric  Nose: No rhinorrhea  Mouth/Throat: Oropharynx is clear and moist. Pharynx normal  Eyes: Conjunctivae are normal. Pupils are equal, round, and reactive to light. Right eye exhibits no discharge. Left eye exhibits no discharge. Neck: Painless normal range of motion. Neck supple. No LAD. Cardiovascular: Normal rate, regular rhythm, normal heart sounds and intact distal pulses. Exam reveals no gallop and no friction rub. No murmur heard. Pulmonary/Chest:  No respiratory distress. No wheezes. No rales. No rhonchi. No increased work of breathing. No accessory muscle use. Good air exchange throughout. Abdominal: soft, non-tender, no rebound or guarding. No hepatosplenomegaly. Normal bowel sounds throughout. Back: no tenderness to palpation, no deformities, no CVA tenderness  Extremities/Musculoskeletal: Normal range of motion. no tenderness. LLE with marked edema compared to R extending the entire length of the leg. Distal extremities are neurovasc intact. Lymphadenopathy:   No adenopathy. Neurological:  Awake. Coordination normal. CN 2-12 intact. Motor and sensory function intact. Skin: Skin is warm and dry. No rash noted. No pallor. MDM 68y F here with LLE swelling, difficulty with ambulation, and syncope. Will need labs, ultrasound, ecg. Likely admit. ED Course       Procedures        ED EKG interpretation:  Rhythm: normal sinus rhythm; and regular . Rate (approx.): 91;  Axis: normal; P wave: normal; QRS interval: normal ; ST/T wave: normal;  This EKG was interpreted by Italo Lo MD,ED Provider.

## 2017-04-17 NOTE — IP AVS SNAPSHOT
[address] [place] [address] 
[phone] Patient: Ac Adrian MRN: [id number] You are allergic to the following No active allergies Recent Documentation Height Weight BMI OB Status Smoking Status 1.524 m 47.1 kg 20.28 kg/m2 Menopause Never Smoker Unresulted Labs Order Current Status SAMPLE TO BLOOD BANK In process Emergency Contacts Name Discharge Info Relation Home Work Mobile [facility] DISCHARGE CAREGIVER [3] Other Relative [6] [phone] Shen Ramirez  Other Relative [6]   [phone] About your hospitalization You were admitted on:  April 18, 2017 You last received care in the:  [address] You were discharged on:  April 21, 2017 Unit phone number:  [phone] Why you were hospitalized Your primary diagnosis was:  Closed Intertrochanteric Fracture Of Left Femur (Hcc) Your diagnoses also included:  Hip Fracture Requiring Operative Repair (Hcc) Providers Seen During Your Hospitalizations Provider Role Specialty Primary office phone Jonathon Rdz MD Attending Provider Emergency Medicine [phone] Gunnar Beasley MD Attending Provider Internal Medicine [phone] Denisha Sánchez MD Attending Provider Internal Medicine [phone] Giovanni Villegas MD Attending Provider Internal Medicine [phone] Your Primary Care Physician (PCP) Primary Care Physician Office Phone Office Fax Steph Bloton [phone] Follow-up Information Follow up With Details Comments Contact Info Panda Espinoza MD In 3 days  [address] [facility] [address] [place] [address] 
[phone] Current Discharge Medication List  
  
START taking these medications Dose & Instructions Dispensing Information Comments Morning Noon Evening Bedtime traMADol 50 mg tablet Commonly known as:  ULTRAM  
   
Your last dose was: Your next dose is:    
   
   
 Dose:  50 mg Take 1 Tab by mouth every six (6) hours as needed. Max Daily Amount: 200 mg. Quantity:  20 Tab Refills:  0 CONTINUE these medications which have NOT CHANGED Dose & Instructions Dispensing Information Comments Morning Noon Evening Bedtime CENTRUM SILVER PO Your last dose was: Your next dose is:    
   
   
 Dose:  1 Tab Take 1 Tab by mouth nightly. Refills:  0  
     
   
   
   
  
 donepezil 5 mg tablet Commonly known as:  ARICEPT Your last dose was: Your next dose is:    
   
   
 Dose:  5 mg Take 5 mg by mouth nightly. Refills:  0 STOP taking these medications   
 metoprolol succinate 25 mg XL tablet Commonly known as:  TOPROL-XL Where to Get Your Medications Information on where to get these meds will be given to you by the nurse or doctor. ! Ask your nurse or doctor about these medications  
  traMADol 50 mg tablet Discharge Instructions Discharge SNF/Rehab Instructions/LTAC  
 
 
PATIENT ID: David Winslow MRN: [id number] YOB: 1935 DATE OF ADMISSION: 4/17/2017  6:43 PM   
DATE OF DISCHARGE: 4/21/2017 PRIMARY CARE PROVIDER: Ranulfo Gómez MD  
 
 
ATTENDING PHYSICIAN: Tim Alcantara MD 
DISCHARGING PROVIDER: Tim Alcantara MD    
To contact this individual call [phone] and ask the  to page. If unavailable ask to be transferred the Adult Hospitalist Department. CONSULTATIONS: IP CONSULT TO ORTHOPEDIC SURGERY 
IP CONSULT TO HOSPITALIST 
IP CONSULT TO CARDIOLOGY 
IP CONSULT TO INTERVENTIONAL RADIOLOGY PROCEDURES/SURGERIES: * No surgery found * [address] COURSE:  
Left hip,left  proximal fibular fracture likely due to fall 
-Pain control -Ortho consulted. They think the hip fracture is subacute. Planning to treat this concervatively 
-d/c maciel 
-pt /ot 
  
Left leg acute DVT 
-Held anticoagulation initially due to severe anemia 
-Temporary IVC filter placed,4/18  
-No GI bleed, HB stable. -4/20, started on eliquis (by previous hospitalist). She has removable IVC filter. With the history of dementia and recent falls I feel that the patient should be off anticoagulation. Tried to reach the patient's PMD as well as mPOA on phone but could not. Will let PMD address that as an outpatient. Will not discharge the patient on any anticoagulation 
  
Acute blood loss anemia,suspect bleeding due to the farcture 
-HB 6.6 
-Received 2 units. Hb 8-9. Stool occult negative 
-No evidence of bleeding. 
-now h/h stable 
  
  
Reported recurrent syncope 
-Head CT negative 
-Echocardiogram is normal 
-Consulted cardiology, suggested very anemic and low normal BP likely cause of syncope. Cardiology signed off 
  
Hypernatremia: due to poor oral intake 
-improving. switch to hypotonic fluid. 
-Start diet 
  
Dehydration: continue iv hydration 
-improving  
  
Alzheimer dementia.  
  
Hypertension,would resume home BB when able Thrombocytosis due to dehydration, improving with fluids.  
  
PT/OT SNF FULL CODE. Outpatient address with PMD 
 
 
 
DISCHARGE DIAGNOSES / PLAN:   
 
1. Left DVT s/p IVC filter. Not on anticoagulation 2. Left hip/left proximal fibular fracture medical management PENDING TEST RESULTS:  
At the time of discharge the following test results are still pending: none FOLLOW UP APPOINTMENTS:   
Follow-up Information Follow up With Details Comments Contact Info Fabiola Cesar MD In 3 days  [address] [facility] [address] [facility] 
[phone] ADDITIONAL CARE RECOMMENDATIONS:  
Fall precautions Please readdress the issue of anticoagulation/blood thinners with your PMD 
 Please address the issue of code status with your PMD 
 
DIET: Cardiac Diet ACTIVITY: Activity as tolerated and as recommended by PT/OT 
 
 
DISCHARGE MEDICATIONS: 
 See Medication Reconciliation Form NOTIFY YOUR PHYSICIAN FOR ANY OF THE FOLLOWING:  
Fever over 101 degrees for 24 hours. Chest pain, shortness of breath, fever, chills, nausea, vomiting, diarrhea, change in mentation, falling, weakness, bleeding. Severe pain or pain not relieved by medications. Or, any other signs or symptoms that you may have questions about. DISPOSITION: 
  Home With: 
 OT  PT  HH  RN  
  
x SNF/Inpatient Rehab/LTAC Independent/assisted living Hospice Other:  
 
 
PATIENT CONDITION AT DISCHARGE:  
 
Functional status  
x Poor Deconditioned Independent Cognition David Ovens Forgetful   
x Dementia Catheters/lines (plus indication) Don PICC   
 PEG   
x None Code status  
x  Full code DNR   
 
PHYSICAL EXAMINATION AT DISCHARGE: 
Please see progress note CHRONIC MEDICAL DIAGNOSES: 
Problem List as of 4/21/2017  Date Reviewed: 4/18/2017 Codes Class Noted - Resolved Hip fracture requiring operative repair [facility]) ICD-10-CM: F46.871E ICD-9-CM: 820.8  4/18/2017 - Present * (Principal)Closed intertrochanteric fracture of left femur ([facility] [address] ICD-10-CM: W65.397Q ICD-9-CM: 820.21  4/17/2017 - Present Nausea vomiting and diarrhea ICD-10-CM: R11.2, R19.7 ICD-9-CM: 787.91, 787.01  1/19/2017 - Present Dementia (Chronic) ICD-10-CM: F03.90 ICD-9-CM: 294.20  1/19/2017 - Present Status post Osito's procedure (HCC) (Chronic) ICD-10-CM: Z93.3 ICD-9-CM: V44.3  6/14/2016 - Present Hypertension (Chronic) ICD-10-CM: I10 
ICD-9-CM: 401.9  6/14/2016 - Present CDMP Checked:  
Yes x PROBLEM LIST Updated: 
Yes x Signed:  
Kadeem Ribeiro MD 
4/21/2017 
9:12 AM 
 
  
 
Discharge Orders None MyChart Announcement We are excited to announce that we are making your provider's discharge notes available to you in [facility]. You will see these notes when they are completed and signed by the physician that discharged you from your recent hospital stay. If you have any questions or concerns about any information you see in [facility], please call the Health Information Department where you were seen or reach out to your Primary Care Provider for more information about your plan of care. Introducing [facility] & HEALTH SERVICES! [employer] introduces [employer] patient portal. Now you can access parts of your medical record, email your doctor's office, and request medication refills online. 1. In your internet browser, go to https://Physician Referral Network (PRN). [facility]/Physician Referral Network (PRN) 2. Click on the First Time User? Click Here link in the Sign In box. You will see the New Member Sign Up page. 3. Enter your [facility] Access Code exactly as it appears below. You will not need to use this code after youve completed the sign-up process. If you do not sign up before the expiration date, you must request a new code. · [facility] Access Code: [email or web address] Expires: 7/20/2017  9:14 AM 
 
4. Enter the last four digits of your Social Security Number (xxxx) and Date of Birth (mm/dd/yyyy) as indicated and click Submit. You will be taken to the next sign-up page. 5. Create a [facility] ID. This will be your [facility] login ID and cannot be changed, so think of one that is secure and easy to remember. 6. Create a [facility] password. You can change your password at any time. 7. Enter your Password Reset Question and Answer. This can be used at a later time if you forget your password. 8. Enter your e-mail address. You will receive e-mail notification when new information is available in [address] 9. Click Sign Up. You can now view and download portions of your medical record.  
10. Click the Download Summary menu link to download a portable copy of your medical information. If you have questions, please visit the Frequently Asked Questions section of the [employer] website. Remember, MyChart is NOT to be used for urgent needs. For medical emergencies, dial 911. Now available from your iPhone and Android! General Information Please provide this summary of care documentation to your next provider. Patient Signature:  ____________________________________________________________ Date:  ____________________________________________________________  
  
Linda Cons Provider Signature:  ____________________________________________________________ Date:  ____________________________________________________________

## 2017-04-18 PROBLEM — S72.009A HIP FRACTURE REQUIRING OPERATIVE REPAIR (HCC): Status: ACTIVE | Noted: 2017-01-01

## 2017-04-18 NOTE — PROGRESS NOTES
[id number]  SBAR report given to Luisana Mantilla RN of IR.   1945H  CLAUS, MICHAEL GEORGE report given to [place].

## 2017-04-18 NOTE — CONSULTS
FRACTURE CONSULT NOTE    Subjective:     Date of Consultation:  2017      Breann Greene is a 80 y.o. female who is being seen for left hip fracture. Injury occurred perhaps several days to a week ago when she had a witnessed ground level fall at home. She had been able to get up but has had a steady decline in her function and increase in her level of pain. Today her pain was intolerable and therefore was brought to [facility] ED for evaluation. Workup has revealed a left  Intertrochanteric hip fracture with varus displacement . Pt.denies head trauma/LOC during injury. She resides in a home with her family, accompanied by son. She walks independently. Patient Active Problem List    Diagnosis Date Noted    Closed intertrochanteric fracture of left femur ([facility] [address] 2017    Nausea vomiting and diarrhea 2017    Dementia 2017    Status post Osito's procedure ([facility] [address] 2016    Hypertension 2016     Family History   Problem Relation Age of Onset    Cancer Sister      pancreatic    Anesth Problems Neg Hx       Social History   Substance Use Topics    Smoking status: Never Smoker    Smokeless tobacco: Never Used    Alcohol use No     Past Medical History:   Diagnosis Date    Alzheimer's dementia     Diagnosed while in rehab recovering from colon resection.  Chronic pain     lower back    History of colonic polyps     Tubular adenoma excised colonoscopically on 2016.  Hypertension     Lipoma of colon     Resected on 2016. Past Surgical History:   Procedure Laterality Date    HX  SECTION      HX COLONOSCOPY  2016    Dr. Karla Mcgowan.  HX OTHER SURGICAL  2016    Reduction of intussusception, sigmoid colon resection, and creation of end colostomy and Osito's pouch to treat colonic obstruction secondary to colorectal intussusception of a sigmoid colon lipoma; Dr. Karla Mcgowan.       Prior to Admission medications    Medication Sig Start Date End Date Taking? Authorizing Provider   metoprolol succinate (TOPROL-XL) 25 mg XL tablet Take 25 mg by mouth daily. Historical Provider   FOLIC ACID/MULTIVIT-MIN/LUTEIN (CENTRUM SILVER PO) Take 1 Tab by mouth daily. Historical Provider   donepezil (ARICEPT) 5 mg tablet Take 5 mg by mouth nightly. Historical Provider   LACTOSE-REDUCED FOOD (ENSURE PO) Take  by mouth three (3) times daily (with meals). Jaydon Judd, MD     No current facility-administered medications for this encounter. Current Outpatient Prescriptions   Medication Sig    metoprolol succinate (TOPROL-XL) 25 mg XL tablet Take 25 mg by mouth daily.  FOLIC ACID/MULTIVIT-MIN/LUTEIN (CENTRUM SILVER PO) Take 1 Tab by mouth daily.  donepezil (ARICEPT) 5 mg tablet Take 5 mg by mouth nightly.  LACTOSE-REDUCED FOOD (ENSURE PO) Take  by mouth three (3) times daily (with meals). No Known Allergies     Review of Systems:  A comprehensive review of systems was negative except for that written in the HPI. Mental Status: confused at baseline    Objective:     Patient Vitals for the past 8 hrs:   BP Temp Pulse Resp SpO2 Height Weight   17 1540 95/51 97.5 °F (36.4 °C) 96 16 100 % 5' (1.524 m) 4.536 kg (10 lb)     Temp (24hrs), Av.5 °F (36.4 °C), Min:97.5 °F (36.4 °C), Max:97.5 °F (36.4 °C)      EXAM: alert, fatigued, cooperative, mild distress, appears stated age, demented at baseline but can answer some questions appropriately  Neuro: no focal deficits. Extremities:  LLE swollen with pedal edema, foreshortened and externally rotated. Pain with log roll of the leg   Capillary refill <2 secs in left foot, Sensation intact in left lower extremity    Imaging Review:        History: Possible fracture, left hip pain.     An AP radiograph of the pelvis demonstrates an intertrochanteric left proximal  femur fracture with varus angulation.  There are atherosclerotic vascular  calcifications.     IMPRESSION  IMPRESSION: Intertrochanteric left proximal femur fracture. Labs:   Recent Results (from the past 24 hour(s))   EKG, 12 LEAD, INITIAL    Collection Time: 04/17/17  4:03 PM   Result Value Ref Range    Ventricular Rate 91 BPM    Atrial Rate 91 BPM    P-R Interval 162 ms    QRS Duration 72 ms    Q-T Interval 332 ms    QTC Calculation (Bezet) 408 ms    Calculated P Axis -6 degrees    Calculated R Axis -13 degrees    Calculated T Axis 14 degrees    Diagnosis       Normal sinus rhythm  Voltage criteria for left ventricular hypertrophy  ST & T wave abnormality, consider lateral ischemia  When compared with ECG of 05-JAN-2016 09:49,  Questionable change in QRS axis  Non-specific change in ST segment in Inferior leads  T wave inversion now evident in Lateral leads     CBC WITH AUTOMATED DIFF    Collection Time: 04/17/17  4:11 PM   Result Value Ref Range    WBC 11.4 (H) 3.6 - 11.0 K/uL    RBC 2.93 (L) 3.80 - 5.20 M/uL    HGB 9.0 (L) 11.5 - 16.0 g/dL    HCT 28.8 (L) 35.0 - 47.0 %    MCV 98.3 80.0 - 99.0 FL    MCH 30.7 26.0 - 34.0 PG    MCHC 31.3 30.0 - 36.5 g/dL    RDW 17.6 (H) 11.5 - 14.5 %    PLATELET 181 (H) 318 - 400 K/uL    NEUTROPHILS 83 (H) 32 - 75 %    LYMPHOCYTES 11 (L) 12 - 49 %    MONOCYTES 6 5 - 13 %    EOSINOPHILS 0 0 - 7 %    BASOPHILS 0 0 - 1 %    ABS. NEUTROPHILS 9.4 (H) 1.8 - 8.0 K/UL    ABS. LYMPHOCYTES 1.3 0.8 - 3.5 K/UL    ABS. MONOCYTES 0.7 0.0 - 1.0 K/UL    ABS. EOSINOPHILS 0.0 0.0 - 0.4 K/UL    ABS.  BASOPHILS 0.0 0.0 - 0.1 K/UL    DF SMEAR SCANNED      RBC COMMENTS ANISOCYTOSIS  1+        RBC COMMENTS MACROCYTOSIS  1+       METABOLIC PANEL, COMPREHENSIVE    Collection Time: 04/17/17  4:11 PM   Result Value Ref Range    Sodium 148 (H) 136 - 145 mmol/L    Potassium 4.2 3.5 - 5.1 mmol/L    Chloride 114 (H) 97 - 108 mmol/L    CO2 26 21 - 32 mmol/L    Anion gap 8 5 - 15 mmol/L    Glucose 123 (H) 65 - 100 mg/dL    BUN 59 (H) 6 - 20 MG/DL    Creatinine 0.73 0.55 - 1.02 MG/DL    BUN/Creatinine ratio 81 (H) 12 - 20 GFR est AA >60 >60 ml/min/1.73m2    GFR est non-AA >60 >60 ml/min/1.73m2    Calcium 8.8 8.5 - 10.1 MG/DL    Bilirubin, total 0.7 0.2 - 1.0 MG/DL    ALT (SGPT) 37 12 - 78 U/L    AST (SGOT) 51 (H) 15 - 37 U/L    Alk. phosphatase 172 (H) 45 - 117 U/L    Protein, total 6.4 6.4 - 8.2 g/dL    Albumin 2.3 (L) 3.5 - 5.0 g/dL    Globulin 4.1 (H) 2.0 - 4.0 g/dL    A-G Ratio 0.6 (L) 1.1 - 2.2     TROPONIN I    Collection Time: 04/17/17  4:11 PM   Result Value Ref Range    Troponin-I, Qt. <0.04 <0.05 ng/mL   CK W/ REFLX CKMB    Collection Time: 04/17/17  4:11 PM   Result Value Ref Range     (H) 26 - 192 U/L   NUCLEATED RBC    Collection Time: 04/17/17  4:11 PM   Result Value Ref Range    NRBC 0.0 0  WBC    ABSOLUTE NRBC 0.00 0.00 - 0.01 K/uL   CK-MB,QUANT. Collection Time: 04/17/17  4:11 PM   Result Value Ref Range    CK - MB 6.4 (H) <3.6 NG/ML    CK-MB Index 1.6 0 - 2.5           Impression:     Patient Active Problem List    Diagnosis Date Noted    Closed intertrochanteric fracture of left femur (HCC) 04/17/2017    Nausea vomiting and diarrhea 01/19/2017    Dementia 01/19/2017    Status post Osito's procedure ([facility] [address] 06/14/2016    Hypertension 06/14/2016     Active Problems:    Closed intertrochanteric fracture of left femur ([facility] [address] (4/17/2017)        Plan:   Pt.stable. Review all labs/studies as they become available. Has a displaced left IT hip fracture amenable to trochanteric femoral nail. Reviewed risks, benefits, alternatives, and potential complications and pt and family appear to understand and wish to proceed with operative fixation by Dr. Racheal Herring of [place] [address] tomorrow pending medical optimization and clearance. Cannot exclude possibility of DVT in LLE- recommend doppler US  NPO after MN. Analgesics. Bedrest.  IV fluids. Ice to left hip. Discussed post operative rehab placement options as indicated, would like to go to [facility].   'anna Johnson aware and agree with above plan.       Alease Najjar, PA

## 2017-04-18 NOTE — ED NOTES
Assumed care of pt. Pt resting quietly in bed, skin warm and dry, respirations unlabored, IVF infusing, on mon x3.

## 2017-04-18 NOTE — CONSULTS
Cardiology Consult Note      Patient Name: Lam Trevino  : 1935 MRN: [id number]  Date: 2017  Time: 1:38 PM    Admit Diagnosis: Hip fracture requiring operative repair, left, closed, init*    Primary Cardiologist: None   Consulting Cardiologist: Marina Fernandez M.D.    Reason for Consult: recurrent Syncope    Requesting MD: Dr. Kiet Wilson    HPI:  Lam Trevino is a 80 y.o. female admitted on 2017  for Hip fracture requiring operative repair, left, closed, init*. She has PMH of HTN, alzheimer dementiaTt 2 weeks ago when she developed left leg swelling. The patient also has inability to bear weight on the left leg. Also, for the past couple of weeks, it was reported that the patient has been falling down, what was described as syncopal episode, had not been walking for past week. . The patient was also seen by the neurologist. According to report, the neurologist referred the patient to the cardiologist for further evaluation of this episode of syncope. Family also reported that for the past week her LLE has been swollen and she is no longer able to put weight on the LLE. Pt did not have any complaints but but doesn't usually say much due to underlying dementia She was subsequently brought to the emergency room today. When the patient arrived at the emergency room, x-ray of the hip shows a left hip fracture. Orthopedics was consulted and trial of nonoperative management has been chosen. She was also found to have a left posterior tibial DVT. No antiocoagulation due to anemia (hgb 6.6); IVC filter being considered. Cardiology was consulted to evaluate for reported recurrent syncope episodes. Subjective:  Pt c/o pain in left hip currently- no other complaints. Does not recall ever passing out. She is not sure if she has been dizzy.   I spoke with pt's nephew by phone, Mr. Rosa Pena, who stated that pt has been falling a lot and he says that she has had approximately 5 episodes of \"passing out. \"  When asked to describe incident, he stated that she would be sitting at dinner table and head would drop down and she would not be responsive but would eventually wake up. No tremors or convulsions noted with incident. Stated neurology recommended cardiology workup. Mr. Monique Schmidt stated that his aunt has no cardiac history, no history of MI and has never seen a cardiologist as far as he is aware. Also states that pt never complains of chest pain, shortness of breath or dizziness. Lives with nephew and family. Assessment and Plan     1. Anemia, acute blood loss: Suspect likely due to femur fracture-  hgb 6.6 today. -Transfusion in progress- 2 units ordered. 2. Reported recurrent syncope:  12 lead EKG: NSR, LVH by voltage criteria, T wave inversion lateral leads, nonspecific ST change inferior leads. -CT head:  No acute process   -TTE EF 60-65%, No RWMA   -Continue telemetry monitoring   -Transfusion for anemia    3. Left femur fx, left proximal fibula fx.-    -Ortho following- fx appears older than 1 week- healing, trialing nonop treatment for now. 4. Left posterior tibial DVT-    -no anticoagulation at this time due to anemia   -IVC filter planned. 5. Trivial troponin elevation- 0.07 from 0.04- no chest pain or SOB. TTE NL EF and no RWMA   -Likely related to pronounced anemia    6. Hypernatremia- sodium 154 this a.m.   -Management per primary team.    7. Hx of Hypertension:  BP now low normal   -Holding home toprol XL 25 mg     8. Hx of alzheimers dementia  I have seen and examined the patient and agree with N.P. assessment.   Echo with normal EF   Very anemic and low normal BP likely causes of syncope  No additional cardiac testing needed  I would avoid toprol upon dc and allow Bp up to 150  i will sign off for now and remain available as needed     TTE 4/18/17:  Left ventricle: Systolic function was normal. Ejection fraction was  estimated in the range of 60 % to 65 %. There were no regional wall motion  Abnormalities. BLE ultrasound    There is a soft tissue mass which measures 2.9 x 3.5 x  3.8 cm which has vascularity and appears to displace the major  vessels. The common femoral, deep femoral, femoral, popliteal, tibial,  peroneal, and great saphenous are patent and without evidence of  thrombus; each is fully compressible and there is no narrowing of the  flow channel on color Doppler imaging. The posterior tibial vein is  non compressible and there is thrombus which appears to completely  occult the vessel. Phasic flow is observed in the common femoral  vein.:        Review of Symptoms:  Review of systems not obtained due to patient factors. Previous treatment/evaluation includes none . Cardiac risk factors: hypertension, post-menopausal.    Past Medical History:   Diagnosis Date    Alzheimer's dementia     Diagnosed while in rehab recovering from colon resection.  Chronic pain     lower back    History of colonic polyps     Tubular adenoma excised colonoscopically on 2016.  Hypertension     Lipoma of colon     Resected on 2016. Past Surgical History:   Procedure Laterality Date    HX  SECTION      HX COLONOSCOPY  2016    Dr. Sharmila Julien.  HX OTHER SURGICAL  2016    Reduction of intussusception, sigmoid colon resection, and creation of end colostomy and Osito's pouch to treat colonic obstruction secondary to colorectal intussusception of a sigmoid colon lipoma; Dr. Sharmila Julien.      Current Facility-Administered Medications   Medication Dose Route Frequency    donepezil (ARICEPT) tablet 5 mg  5 mg Oral QHS    dextrose 5% and 0.9% NaCl infusion  75 mL/hr IntraVENous CONTINUOUS    ondansetron (ZOFRAN) injection 4 mg  4 mg IntraVENous Q4H PRN    docusate sodium (COLACE) capsule 100 mg  100 mg Oral BID    pantoprazole (PROTONIX) 40 mg in sodium chloride 0.9 % 10 mL injection  40 mg IntraVENous DAILY    enoxaparin (LOVENOX) injection 111 mg  111 mg SubCUTAneous Q12H    0.9% sodium chloride infusion 250 mL  250 mL IntraVENous PRN    0.9% sodium chloride infusion 250 mL  250 mL IntraVENous PRN    traMADol (ULTRAM) tablet 50 mg  50 mg Oral Q6H PRN    acetaminophen (TYLENOL) tablet 500 mg  500 mg Oral Q6H PRN    morphine injection 1-2 mg  1-2 mg IntraVENous Q3H PRN       No Known Allergies   Family History   Problem Relation Age of Onset    Cancer Sister      pancreatic    Anesth Problems Neg Hx       Social History     Social History    Marital status:      Spouse name: N/A    Number of children: N/A    Years of education: N/A     Social History Main Topics    Smoking status: Never Smoker    Smokeless tobacco: Never Used    Alcohol use No    Drug use: No    Sexual activity: Not Asked     Other Topics Concern    None     Social History Narrative       Objective:    Physical Exam    Vitals:   Vitals:    04/18/17 1330 04/18/17 1400 04/18/17 1436 04/18/17 1503   BP: 112/61 117/51 114/56 109/41   Pulse: 89 90 85 84   Resp: 20 16 16 16   Temp: 97.4 °F (36.3 °C) 97.7 °F (36.5 °C) 97.9 °F (36.6 °C) 98.1 °F (36.7 °C)   SpO2: 100% 100% 100% 99%   Weight:       Height:           General:    Awake, cooperative, confused, appears stated age. Neck:   Supple,  no JVD. Back:     Not examined    Lungs:     clear to auscultation bilaterally. - no use of accessory muscles noted   Heart[de-identified]    Regular rate and rhythm, S1, S2 normal, no murmur, click, rub or gallop. Abdomen:     Soft, non-distended. Extremities:   +LLE edema. Vascular:   Pulses - 2+   Skin:   Skin color normal. No rashes or lesions   Neurologic:   GCS 14       Telemetry: normal sinus rhythm    ECG: NSR, LVH by voltage criteria, T wave inversion lateral leads, nonspecific ST change inferior leads.       Data Review:     Radiology:     Recent Labs      04/18/17   0606  04/17/17   1611   CPK  211*   -- TROIQ  0.07*  <0.04     Recent Labs      04/18/17   1404  04/18/17   0606   NA  152*  153*   K  3.6  3.9   CL  119*  121*   CO2  28  27   BUN  41*  46*   CREA  0.48*  0.47*   GLU  88  104*   CA  7.7*  7.8*     Recent Labs      04/18/17   0907  04/18/17   0606  04/17/17   1611   WBC   --   8.2  11.4*   HGB  6.6*  6.7*  9.0*   HCT  21.7*  21.5*  28.8*   PLT   --   426*  545*     Recent Labs      04/18/17   0606  04/17/17   1611   SGOT  29  51*   AP  136*  172*     No results for input(s): TGL, CHOL, LDLC in the last 72 hours. No lab exists for component: HDLC,  HBA1C  Recent Labs      04/18/17   0606   TSH  1.42       Thank you very much for this referral. I appreciate the opportunity to participate in this patient's care. I will follow along with above stated plan.     Berta Buitrago MD         [facility] [address] [place] [address] [address] [facility] [address]     [place], [address]     [phone]    Minerva Denson MD

## 2017-04-18 NOTE — PROGRESS NOTES
Patient in from home after having several falls and passing out. Patient will have hip surgery tomorrow. Ortho noted patient wants to go to [facility] at discharge. Referral sent to [facility] via [address].     Care Management Interventions  Transition of Care Consult (CM Consult): SNF (referral sent to [facility] per patient request)  Current Support Network: [place] (lives with nephew and his wife)  Discharge Location  Discharge Placement: Skilled nursing facility ([facility])

## 2017-04-18 NOTE — ED NOTES
Pt's family would like to be updated as to when surgery will occur tomorrow; pts niece's name is [facility] and she can be reached @ [phone]

## 2017-04-18 NOTE — H&P
[address] [facility] [address]   HISTORY AND PHYSICAL       Name:  Heidy Valerio   MR#:  [id number]   :  1935   Account #:  [de-identified]        Date of Adm:  2017       PRIMARY CARE PHYSICIAN: Дмитрий Lutz MD    SOURCE OF INFORMATION: ED medical records. CHIEF COMPLAINT: Left leg swelling. HISTORY OF PRESENT ILLNESS: This is a 79-year-old woman with   a past medical history significant for hypertension, Alzheimer   dementia, who was in her usual state of health until about 2 weeks ago   when the patient developed left leg swelling. The patient also has   inability to bear weight on the left leg. Also, for the past couple of   weeks, it was reported that the patient has been falling down, what   was described as syncopal episode. The patient was also seen by the   neurologist. According to report, the neurologist referred the patient to   the cardiologist for further evaluation of this episode of syncope. The   patient is not able to tell whether she was having pain in the left leg   because of significant Alzheimer dementia. She was subsequently   brought to the emergency room today. When the patient arrived at the   emergency room, x-ray of the hip shows a left hip fracture. Orthopedic   service on-call was consulted by the emergency room physician. Admission to the hospitalist service was advised. The patient was then   referred to the hospitalist service for evaluation for admission. The   patient has no fever, no rigors, no chills. Also, it is not clear when the   patient actually sustained the hip fracture. PAST MEDICAL HISTORY: Alzheimer dementia, hypertension. ALLERGIES: NO KNOWN DRUG ALLERGIES. MEDICATIONS:   1. Aricept 5 mg daily. 2. Toprol XL 25 mg daily. 3. Multivitamin 1 tablet daily. FAMILY HISTORY: This was reviewed. Sister had pancreatic cancer.     PAST SURGICAL HISTORY: This is significant for  section, abdominal surgery. SOCIAL HISTORY: No history of alcohol or tobacco abuse. REVIEW OF SYSTEMS   Unable to obtain because of the patient's dementia. PHYSICAL EXAMINATION   GENERAL APPEARANCE: The patient appeared ill, in moderate   distress. VITAL SIGNS: On arrival at the emergency room, temperature 97.5,   pulse 96, respiratory rate 16, blood pressure 95/51, oxygen saturation   100% on room air. HEAD: Normocephalic, atraumatic. EYES: Unable to assess eye movement, but no redness, no drainage,   no discharge. EARS: Normal external ears with no obvious drainage. NOSE: No deformity, no drainage. MOUTH AND THROAT: No visible oral lesion. Dry oral mucosa. NECK: Supple. No JVD. No thyromegaly. CHEST: Clear breath sounds. No wheezing, no crackles. HEART: Normal S1 and S2, regular. No clinically appreciable murmur. ABDOMEN: Soft, nontender, normal bowel sounds. CENTRAL NERVOUS SYSTEM: Alert, not oriented. EXTREMITIES: Left leg swelling noted. Pulses 2+ bilaterally. MUSCULOSKELETAL: Deformity and tenderness of the left hip noted. SKIN: No active skin lesions seen in the exposed parts of the body. PSYCHIATRY: Normal mood, but flat affect. LYMPHATICS: No cervical lymphadenopathy. DIAGNOSTIC DATA: X-ray of the pelvis, as well as x-ray of the left   femur shows a hip fracture. EKG shows normal sinus rhythm, left   ventricular hypertrophy and nonspecific ST and T-wave abnormalities. LABORATORY DATA: Urinalysis: This is significant for negative   nitrites, negative leukocyte esterase, negative bacteria. CHEMISTRIES: Sodium 148, potassium 4.2, chloride 114, CO2 of 26,   glucose 123, BUN 59, creatinine 0.73. Calcium 8.8, bilirubin total 0.7,   ALT 37, AST is 51, alkaline phosphatase 172, total protein 6.4,   albumin level 2.3, globulin 4.1. Cardiac profile: Troponin less than 0.04. Hematology: WBC 11.4, hemoglobin 9.0, hematocrit 28.8, platelet 144. ASSESSMENT:   1.  Left hip fracture. 2. Alzheimer dementia. 3. Hypertension. 4. Dehydration. 5. Thrombocytosis. 6. Left leg swelling. 7. Anemia. 8. Syncope. PLAN:   1. Left hip fracture. Will admit the patient for pain control while awaiting   definitive treatment from the orthopedic service. 2. Alzheimer dementia. Will continue with preadmission medication as   well as other supportive therapy. 3. Hypertension. The patient's list of home medications did not include   any antihypertensive medication. The patient had blood pressure, is   borderline at presentation. The patient will not require any   antihypertensive medications at present. 4. Dehydration. This is supported by elevated BUN and normal   creatinine. Will carry out hydration with saline and monitor the patient   clinically. 5. Thrombocytosis. This is most likely reactive thrombocytosis. Will   monitor the patient's platelet count. 6. Left Leg swelling. Will obtain an ultrasound of the left leg to evaluate   the patient for deep vein thrombosis. 7. Anemia. The etiology of the anemia is not clear. The ratio of the   BUN to creatinine will also be suggestive of gastrointestinal bleed. The   patient will be started on Protonix. Will check stool guaiac to evaluate   the patient for occult gastrointestinal bleed. Will also obtain an iron,   ferritin and TIBC level. Will check J37 and folic acid levels. 8. Syncope. Will check CT scan of the head as well as an   echocardiogram. Will also check cardiac markers. OTHER ISSUES:   CODE STATUS: THE PATIENT IS A FULL CODE. Will place the patient Heparin for DVT prophylaxis.         Larissa Neff MD      RE / CD   D:  04/18/2017   00:11   T:  04/18/2017   04:58   Job #:  [id number]

## 2017-04-18 NOTE — PROGRESS NOTES
I talked with Sydney Ibarra over the phone about Ms Kleber Priest. He reports she was ambulating around the house unit last Tues. I discussed with him that her hip fracture looks to be older than that. We discussed non operative treatment of both her hip and fibula fx vs calcar replacing hip replacement. At this point we will proceed with non operative treatment. -  We will start PT/OT, WBAT to LLE.   - Pain control  - Medical optimization  - if she does not mobilize well we will reconsider surgical option with one of our joint surgeons.      Tyson De La Torre MD

## 2017-04-18 NOTE — ED NOTES
Pt alert, answers basic questions, denies pain, respirations unlabored, skin warm and dry, not vomiting. Left leg is very edematous and some purple discoloration on upper portion of leg. Pedal pulse intact. Pt c/o pain on movement/repositioning.

## 2017-04-18 NOTE — CDMP QUERY
Patient is noted to have a BMI of 47.23. Please clarify if this patient is:     =>Morbidly obese  =>Obese   =>Overweight  =>Other explanation of clinical findings  =>Unable to determine (no explanation for clinical findings)    Presentation:5' 1\", 241 lbs = BMI 47.23    REFERENCE:  The [address] [place] [address] has issued a statement indicating that, \"Individuals who are overweight, obese, or morbidly obese are at an increased risk for certain medical conditions when compared to persons of normal weight. Therefore, these conditions are always clinically significant and reportable when documented by the provider. Please clarify and document your clinical opinion in the progress notes and discharge summary, including the definitive and or presumptive diagnosis, (suspected or probable), related to the above clinical findings. Please include clinical findings supporting your diagnosis.      Thank you  Christine Vitale  [facility]  [phone]

## 2017-04-18 NOTE — PROGRESS NOTES
Received a call back from [facility] who then connected me to [employer] nephRENETTA ferraro who makes decisions. Cheyanne Neptali confirmed pt did not have mPOA,he is the closest family who make decisions for her. Updated him about the anemia and need for transfusion,DVT with need for IVC filter for short term. I told him its important to return calls from us that we need permission from him for some treatment procedures    He can be reached @[phone]

## 2017-04-18 NOTE — PROGRESS NOTES
Hospitalist Progress Note  Jatin Raphael MD  Office: [phone]        Date of Service:  2017  NAME:  Abad Krishnan  :  1935  MRN:  [id number]      Admission Summary:   Per HPI     \"This is a 29-year-old woman with a past medical history significant for hypertension, Alzheimer   dementia, who was in her usual state of health until about 2 weeks ago when she developed left leg swelling. The patient also has inability to bear weight on the left leg. Also, for the past couple of   weeks, it was reported that the patient has been falling down, what was described as syncopal episode. The patient was also seen by the neurologist. According to report, the neurologist referred the patient to   the cardiologist for further evaluation of this episode of syncope. The patient is not able to tell whether she was having pain in the left leg because of significant Alzheimer dementia. She was subsequently   brought to the emergency room today. When the patient arrived at the emergency room, x-ray of the hip shows a left hip fracture. Orthopedic service on-call was consulted by the emergency room physician. Admission to the hospitalist service was advised. The patient was then referred to the hospitalist service for evaluation for admission. The patient has no fever, no rigors, no chills. Also, it is not clear when the   patient actually sustained the hip fracture. \"      Interval history / Subjective:      She could not tell me anything,mumbled her name. Assessment & Plan:     Left hip,lef proxima fibular  fracture likely due to fall  -Pain control  -Ortho consulted. Holding off on any intervention awaiting family discussion.  -Don catheter placed    Left leg acute DVT  -Cant anticoagulate for now due to severe anemia  -May need temporary IVC filter,then if no other source of anemia such as GI bleed,may anticoagulate    Acute blood loss anemia,suspect bleeding due to the farcture  -HB 6.6  -Ordered 2 units  -Monitor HCT. Check stool occult. Reported recurrent syncope  -Head CT negative  -Will check echocardiogram  -Consult cardiology   Hypernatremia: due to poor oral intake  -Continue iv hydration  -Monitor     Dehydration: continue iv hydration    Alzheimer dementia. Hypertension,would resume home BB when able  Thrombocytosis due to dehydration, improving with fluids. Code status: full  DVT prophylaxis: scd    Care Plan discussed with: Unable to reach family on the phone numbers listed,  Disposition: TBD     Hospital Problems  Date Reviewed: 4/18/2017          Codes Class Noted POA    Hip fracture requiring operative repair [facility]) ICD-10-CM: L12.140L  ICD-9-CM: 820.8  4/18/2017 Unknown        * (Principal)Closed intertrochanteric fracture of left femur ([facility] [address] ICD-10-CM: D64.291P  ICD-9-CM: 820.21  4/17/2017 Unknown                Review of Systems:   Pertinent items are noted in HPI. Vital Signs:    Last 24hrs VS reviewed since prior progress note. Most recent are:  Visit Vitals    /53 (BP 1 Location: Left arm, BP Patient Position: At rest)    Pulse (!) 102    Temp 98.8 °F (37.1 °C)    Resp 19    Ht 5' (1.524 m)    Wt 109.7 kg (241 lb 13.5 oz)    SpO2 100%    BMI 47.23 kg/m2       No intake or output data in the 24 hours ending 04/18/17 0910     Physical Examination:             Constitutional:  Demented,does not talk much,mumbles   ENT:  Oral mucous moist, oropharynx benign. Neck supple,    Resp:  CTA bilaterally. No wheezing/rhonchi/rales. No accessory muscle use   CV:  Regular rhythm, normal rate, no murmurs, gallops, rubs    GI:  Soft, non distended, non tender. normoactive bowel sounds, no hepatosplenomegaly     Musculoskeletal:  Swollen entire left leg. Neurologic:  Awake,demented.             Data Review:    Review and/or order of clinical lab test  Review and/or order of tests in the radiology section of CPT  Review and/or order of tests in the medicine section of CPT      Labs:     Recent Labs      04/18/17   0606  04/17/17   1611   WBC  8.2  11.4*   HGB  6.7*  9.0*   HCT  21.5*  28.8*   PLT  426*  545*     Recent Labs      04/18/17   0606  04/17/17   1611   NA  153*  148*   K  3.9  4.2   CL  121*  114*   CO2  27  26   BUN  46*  59*   CREA  0.47*  0.73   GLU  104*  123*   CA  7.8*  8.8   MG  2.6*   --      Recent Labs      04/18/17   0606  04/17/17   1611   SGOT  29  51*   ALT  26  37   AP  136*  172*   TBILI  0.5  0.7   TP  4.6*  6.4   ALB  1.9*  2.3*   GLOB  2.7  4.1*     No results for input(s): INR, PTP, APTT in the last 72 hours. No lab exists for component: INREXT   Recent Labs      04/18/17   0606   FE  34*  35   TIBC  205*   PSAT  17*   FERR  178      Lab Results   Component Value Date/Time    Folate 11.8 04/18/2017 06:06 AM      No results for input(s): PH, PCO2, PO2 in the last 72 hours.   Recent Labs      04/18/17   0606  04/17/17   1611   CPK  211*   --    CKNDX  1.9  1.6   TROIQ  0.07*  <0.04     No results found for: CHOL, CHOLX, CHLST, CHOLV, HDL, LDL, DLDL, LDLC, DLDLP, TGL, TGLX, TRIGL, TRIGP, CHHD, CHHDX  Lab Results   Component Value Date/Time    Glucose (POC) 121 04/18/2017 12:46 AM     Lab Results   Component Value Date/Time    Color YELLOW/STRAW 04/17/2017 08:36 PM    Appearance CLOUDY 04/17/2017 08:36 PM    Specific gravity 1.027 04/17/2017 08:36 PM    pH (UA) 5.5 04/17/2017 08:36 PM    Protein TRACE 04/17/2017 08:36 PM    Glucose NEGATIVE  04/17/2017 08:36 PM    Ketone NEGATIVE  04/17/2017 08:36 PM    Bilirubin NEGATIVE  06/09/2016 03:35 PM    Urobilinogen 1.0 04/17/2017 08:36 PM    Nitrites NEGATIVE  04/17/2017 08:36 PM    Leukocyte Esterase NEGATIVE  04/17/2017 08:36 PM    Epithelial cells FEW 04/17/2017 08:36 PM    Bacteria NEGATIVE  04/17/2017 08:36 PM    WBC 0-4 04/17/2017 08:36 PM    RBC 5-10 04/17/2017 08:36 PM         Medications Reviewed:     Current Facility-Administered Medications   Medication Dose Route Frequency    donepezil (ARICEPT) tablet 5 mg  5 mg Oral QHS    dextrose 5% and 0.9% NaCl infusion  75 mL/hr IntraVENous CONTINUOUS    ondansetron (ZOFRAN) injection 4 mg  4 mg IntraVENous Q4H PRN    docusate sodium (COLACE) capsule 100 mg  100 mg Oral BID    pantoprazole (PROTONIX) 40 mg in sodium chloride 0.9 % 10 mL injection  40 mg IntraVENous DAILY    enoxaparin (LOVENOX) injection 111 mg  111 mg SubCUTAneous Q12H    0.9% sodium chloride infusion 250 mL  250 mL IntraVENous PRN    traMADol (ULTRAM) tablet 50 mg  50 mg Oral Q6H PRN    acetaminophen (TYLENOL) tablet 500 mg  500 mg Oral Q6H PRN    morphine injection 1-2 mg  1-2 mg IntraVENous Q3H PRN     Current Outpatient Prescriptions   Medication Sig    metoprolol succinate (TOPROL-XL) 25 mg XL tablet Take 25 mg by mouth nightly.  FOLIC ACID/MULTIVIT-MIN/LUTEIN (CENTRUM SILVER PO) Take 1 Tab by mouth nightly.     donepezil (ARICEPT) 5 mg tablet Take 5 mg by mouth nightly.     ______________________________________________________________________  EXPECTED LENGTH OF STAY: - - -  ACTUAL LENGTH OF STAY:          0                 Jackson Murillo MD

## 2017-04-18 NOTE — PROGRESS NOTES
TRANSFER - OUT REPORT:    Verbal report given to Tanesha(name) on Phil Garsia  being transferred to [facility]) for routine progression of care       Report consisted of patients Situation, Background, Assessment and   Recommendations(SBAR). Information from the following report(s) SBAR, Kardex, ED Summary, Procedure Summary, Intake/Output, MAR, Recent Results, Med Rec Status and Cardiac Rhythm Sinus Rhythm was reviewed with the receiving nurse. Lines:   Peripheral IV 04/17/17 Right Arm (Active)   Site Assessment Clean, dry, & intact 4/18/2017  8:00 AM   Phlebitis Assessment 0 4/18/2017  8:00 AM   Infiltration Assessment 0 4/18/2017  8:00 AM   Dressing Status Clean, dry, & intact 4/18/2017  8:00 AM   Dressing Type Transparent 4/18/2017  8:00 AM   Hub Color/Line Status Pink; Infusing 4/18/2017  8:00 AM   Action Taken Blood drawn 4/17/2017  4:18 PM   Alcohol Cap Used Yes 4/17/2017  4:18 PM       Peripheral IV 04/18/17 Left Arm (Active)   Site Assessment Clean, dry, & intact 4/18/2017  8:00 AM   Phlebitis Assessment 0 4/18/2017  8:00 AM   Infiltration Assessment 0 4/18/2017  8:00 AM   Dressing Status Clean, dry, & intact 4/18/2017  8:00 AM   Dressing Type Transparent 4/18/2017  8:00 AM   Hub Color/Line Status Pink;Capped 4/18/2017  8:00 AM   Alcohol Cap Used Yes 4/18/2017  8:00 AM        Opportunity for questions and clarification was provided.       Patient transported with:   Monitor  Registered Nurse  Tech

## 2017-04-18 NOTE — PROGRESS NOTES
Primary Nurse Kaushik Perez, RN and Darlene Fontanez, RN, RN performed a dual skin assessment on this patient Impairment noted- see wound doc flow sheet  Lavon score is 14

## 2017-04-18 NOTE — ED NOTES
Pt remains alert, oriented to self, respirations slightly labored intermittently, skin warm and dry, not vomiting, left leg enlarged.

## 2017-04-18 NOTE — PROCEDURES
[facility]  *** FINAL REPORT ***    Name: Grazyna Alcocer  MRN: [id number]    Inpatient  : 17 Aug 1935  HIS Order #: [id number]  [id number] [facility] Visit #: [id number]  Date: 2017    TYPE OF TEST: Peripheral Venous Testing    REASON FOR TEST  Limb swelling    Left Leg:-  Deep venous thrombosis:           Yes  Proximal extent of thrombus:      Posterior Tibial  Superficial venous thrombosis:    No  Deep venous insufficiency:        Not examined  Superficial venous insufficiency: Not examined      INTERPRETATION/FINDINGS  PROCEDURE:  Color duplex ultrasound imaging of lower extremity veins. FINDINGS:       Right: The common femoral vein is patent and without evidence of  thrombus. Phasic flow is observed. This extremity was not otherwise  evaluated. Left:   There is a soft tissue mass which measures 2.9 x 3.5 x  3.8 cm which has vascularity and appears to displace the major  vessels. The common femoral, deep femoral, femoral, popliteal, tibial,  peroneal, and great saphenous are patent and without evidence of  thrombus;  each is fully compressible and there is no narrowing of the   flow channel on color Doppler imaging. The posterior tibial vein is  non compressible and there is thrombus which appears to completely  occult the vessel. Phasic flow is observed in the common femoral  vein. IMPRESSION:  There is evidence of vein thrombosis, as described above. The ultrasound appearance is more consistent with an acute than a  chronic process. A verbal report was given to Dr. Sourav Cabrera by MEREDITH Burger. ADDITIONAL COMMENTS    I have personally reviewed the data relevant to the interpretation of  this  study.     TECHNOLOGIST: William Turner RDMS  Signed: 2017 05:30 AM    PHYSICIAN: Shyann Sepulveda MD  Signed: 2017 06:19 AM

## 2017-04-18 NOTE — PROGRESS NOTES
Admission Medication Reconciliation:    Information obtained from: patient's family member     Significant PMH/Disease States:   Past Medical History:   Diagnosis Date    Alzheimer's dementia     Diagnosed while in rehab recovering from colon resection.  Chronic pain     lower back    History of colonic polyps     Tubular adenoma excised colonoscopically on 4/20/2016.  Hypertension     Lipoma of colon     Resected on 1/5/2016. Chief Complaint for this Admission:  hip fracture     Allergies:  Review of patient's allergies indicates no known allergies. Prior to Admission Medications:   Prior to Admission Medications   Prescriptions Last Dose Informant Patient Reported? Taking? FOLIC ACID/MULTIVIT-MIN/LUTEIN (CENTRUM SILVER PO) 4/16/2017  Yes Yes   Sig: Take 1 Tab by mouth nightly. donepezil (ARICEPT) 5 mg tablet 4/16/2017  Yes Yes   Sig: Take 5 mg by mouth nightly. metoprolol succinate (TOPROL-XL) 25 mg XL tablet 4/16/2017  Yes Yes   Sig: Take 25 mg by mouth nightly. Facility-Administered Medications: None         Comments/Recommendations: History confirmed with family member. She appears to be an accurate historian. An Rx Query is available to confirm the information provided.

## 2017-04-18 NOTE — ROUTINE PROCESS
TRANSFER - OUT REPORT:    Verbal report given to RN (name) on Jay Trinidad  being transferred to 411 (unit) for routine progression of care       Report consisted of patients Situation, Background, Assessment and   Recommendations(SBAR). Information from the following report(s) Procedure Summary was reviewed with the receiving nurse. Lines:   Peripheral IV 04/17/17 Right Arm (Active)   Site Assessment Clean, dry, & intact 4/18/2017  2:47 PM   Phlebitis Assessment 0 4/18/2017  2:47 PM   Infiltration Assessment 0 4/18/2017  2:47 PM   Dressing Status Clean, dry, & intact 4/18/2017  2:47 PM   Dressing Type Transparent;Tape 4/18/2017  2:47 PM   Hub Color/Line Status Infusing;Pink 4/18/2017  2:47 PM   Action Taken Open ports on tubing capped 4/18/2017  2:47 PM   Alcohol Cap Used Yes 4/18/2017  2:47 PM       Peripheral IV 04/18/17 Left Arm (Active)   Site Assessment Clean, dry, & intact 4/18/2017  2:47 PM   Phlebitis Assessment 0 4/18/2017  2:47 PM   Infiltration Assessment 0 4/18/2017  2:47 PM   Dressing Status Clean, dry, & intact 4/18/2017  2:47 PM   Dressing Type Tape;Transparent 4/18/2017  2:47 PM   Hub Color/Line Status Pink; Infusing 4/18/2017  2:47 PM   Alcohol Cap Used Yes 4/18/2017  2:47 PM        Opportunity for questions and clarification was provided post IVC filter placement in IR.     Patient transported with:   O2 @ RA liters

## 2017-04-18 NOTE — ED NOTES
Pt repositioned so head is on pillow. Pt responds to stimuli but continues to rest with eyes closed.

## 2017-04-19 NOTE — PROGRESS NOTES
NUTRITION         Pt seen for pressure ulcer referral. Noted pt likely d/c to SNF tomorrow. RD to add Ensure BID (Vanilla) which provides 700 kcal, 40g protein- pt liked on previous admission. Wt appears stable. RD to perform full assessment if pt not d/c tomorrow.     Wt Readings from Last 10 Encounters:   04/19/17 46.5 kg (102 lb 8.2 oz)   01/19/17 45.8 kg (101 lb)   06/16/16 46.2 kg (101 lb 13.6 oz)   06/09/16 45.8 kg (101 lb)   04/20/16 45.4 kg (100 lb)   01/11/16 44.5 kg (98 lb)       Samuel Guerrero, CARL

## 2017-04-19 NOTE — PROGRESS NOTES
Ortho Progress note  2017     Subjective:     Patient resting comfortably. Alert and conversant today. Eating lunch. Received 2U PRBCs yesterday. Afebrile, VSS. Don in place    Objective:     Blood pressure 128/65, pulse 87, temperature 97.7 °F (36.5 °C), resp. rate 18, height 5' (1.524 m), weight 46.5 kg (102 lb 8.2 oz), SpO2 100 %. Temp (24hrs), Av.6 °F (36.4 °C), Min:97.2 °F (36.2 °C), Max:98.1 °F (36.7 °C)      Physical Exam:    Alert to herself. Interactive, follows commands  LLE: diffuse swelling from the hip down. No TTP over the hip of fibula. ROM of hip without pain. Leg shortened and externally rotated.  NVI distally    Labs:   Lab Results   Component Value Date/Time    HGB 9.7 2017 03:21 AM         Assessment:     Principal Problem:    Closed intertrochanteric fracture of left femur ([address] (2017)    Active Problems:    Hip fracture requiring operative repair ([address] (2017)      Subacute L IT fx, with bony callus formation  L proximal fibula fracture    Plan/Recommendations/Medical Decision Making:     PT/OT to work on mobilization/gait training, WBAT to LLE  Will plan to continue to treat fractures non operatively  Cont medical management of DVT  Pain control with tramadol

## 2017-04-19 NOTE — PROGRESS NOTES
Hospitalist Progress Note  Eric Bassett MD  Office: [phone]        Date of Service:  2017  NAME:  Justino Youngblood  :  1935  MRN:  [id number]      Admission Summary:   Per HPI     \"This is a 81-year-old woman with a past medical history significant for hypertension, Alzheimer   dementia, who was in her usual state of health until about 2 weeks ago when she developed left leg swelling. The patient also has inability to bear weight on the left leg. Also, for the past couple of   weeks, it was reported that the patient has been falling down, what was described as syncopal episode. The patient was also seen by the neurologist. According to report, the neurologist referred the patient to   the cardiologist for further evaluation of this episode of syncope. The patient is not able to tell whether she was having pain in the left leg because of significant Alzheimer dementia. She was subsequently   brought to the emergency room today. When the patient arrived at the emergency room, x-ray of the hip shows a left hip fracture. Orthopedic service on-call was consulted by the emergency room physician. Admission to the hospitalist service was advised. The patient was then referred to the hospitalist service for evaluation for admission. The patient has no fever, no rigors, no chills. Also, it is not clear when the   patient actually sustained the hip fracture. \"      Interval history / Subjective:      Ms.Thomee ADAMES Desmangles awake but confused, does not know she is in hospital(she thinks she is fercho school),day,month or year. Assessment & Plan:     Left hip,lef proxima fibular  fracture likely due to fall  -Pain control  -Ortho consulted. They think the hip fracture is subacute. Planning to treat this concervatively  -Don catheter placed  -pt /ot    Left leg acute DVT  -Hold anticoagulate for now due to severe anemia  -Temporary IVC filter placed,4/18   -No GI bleed, thus if HB stable for few days and no surgery,will start chemical anticoagulation. Acute blood loss anemia,suspect bleeding due to the farcture  -HB 6.6  -Received 2 units. Hb 8-9. Stool occult negative      Reported recurrent syncope  -Head CT negative  -Echocardiogram is normal  -Consulted cardiology     Hypernatremia: due to poor oral intake  -improving. switch to hypotonic fluid.  -Start diet    Dehydration: continue iv hydration  -improving     Alzheimer dementia. Hypertension,would resume home BB when able  Thrombocytosis due to dehydration, improving with fluids. Code status: full  DVT prophylaxis: scd/icd placed. Care Plan discussed with:Nurse. Disposition: she is going to need rehab/snf on discharge     Hospital Problems  Date Reviewed: 4/18/2017          Codes Class Noted POA    Hip fracture requiring operative repair [facility]) ICD-10-CM: E61.016V  ICD-9-CM: 820.8  4/18/2017 Unknown        * (Principal)Closed intertrochanteric fracture of left femur [facility]) ICD-10-CM: T71.906O  ICD-9-CM: 820.21  4/17/2017 Unknown                Review of Systems:   Pertinent items are noted in HPI. Vital Signs:    Last 24hrs VS reviewed since prior progress note. Most recent are:  Visit Vitals    /67 (BP 1 Location: Left arm, BP Patient Position: At rest)    Pulse 86    Temp 97.9 °F (36.6 °C)    Resp 18    Ht 5' (1.524 m)    Wt 46.5 kg (102 lb 8.2 oz)    SpO2 98%    BMI 20.02 kg/m2         Intake/Output Summary (Last 24 hours) at 04/19/17 0852  Last data filed at 04/19/17 0718   Gross per 24 hour   Intake           2718.7 ml   Output             7300 ml   Net          -4581.3 ml        Physical Examination:             Constitutional:  Demented,dconfused. ENT:  Oral mucous moist, oropharynx benign. Neck supple,    Resp:  CTA bilaterally. No wheezing/rhonchi/rales.  No accessory muscle use   CV:  Regular rhythm, normal rate, no murmurs, gallops, rubs    GI: Soft, non distended, non tender. normoactive bowel sounds, no hepatosplenomegaly     Musculoskeletal:  Swollen entire left leg. Neurologic:  Awake,demented. Data Review:    Review and/or order of clinical lab test  Review and/or order of tests in the radiology section of [facility]  Review and/or order of tests in the medicine section of [facility]      Labs:     Recent Labs      04/19/17   0321  04/18/17   2047  04/18/17   0907  04/18/17   0606   WBC  7.2   --    --   8.2   HGB  9.7*  9.9*  6.6*  6.7*   HCT  30.8*   --   21.7*  21.5*   PLT  438*   --    --   426*     Recent Labs      04/19/17   0321  04/18/17   1404  04/18/17   0606   NA  152*  152*  153*   K  3.7  3.6  3.9   CL  119*  119*  121*   CO2  26  28  27   BUN  29*  41*  46*   CREA  0.43*  0.48*  0.47*   GLU  95  88  104*   CA  8.0*  7.7*  7.8*   MG   --    --   2.6*   PHOS  2.9   --    --      Recent Labs      04/18/17   0606  04/17/17   1611   SGOT  29  51*   ALT  26  37   AP  136*  172*   TBILI  0.5  0.7   TP  4.6*  6.4   ALB  1.9*  2.3*   GLOB  2.7  4.1*     No results for input(s): INR, PTP, APTT in the last 72 hours. No lab exists for component: INREXT, INREXT   Recent Labs      04/18/17   0606   FE  34*  35   TIBC  205*   PSAT  17*   FERR  178      Lab Results   Component Value Date/Time    Folate 11.8 04/18/2017 06:06 AM      No results for input(s): PH, PCO2, PO2 in the last 72 hours.   Recent Labs      04/18/17   0606  04/17/17   1611   CPK  211*   --    CKNDX  1.9  1.6   TROIQ  0.07*  <0.04     No results found for: CHOL, CHOLX, CHLST, CHOLV, HDL, LDL, DLDL, LDLC, DLDLP, TGL, TGLX, TRIGL, TRIGP, CHHD, CHHDX  Lab Results   Component Value Date/Time    Glucose (POC) 121 04/18/2017 12:46 AM     Lab Results   Component Value Date/Time    Color YELLOW/STRAW 04/17/2017 08:36 PM    Appearance CLOUDY 04/17/2017 08:36 PM    Specific gravity 1.027 04/17/2017 08:36 PM    pH (UA) 5.5 04/17/2017 08:36 PM    Protein TRACE 04/17/2017 08:36 PM    Glucose NEGATIVE  04/17/2017 08:36 PM    Ketone NEGATIVE  04/17/2017 08:36 PM    Bilirubin NEGATIVE  06/09/2016 03:35 PM    Urobilinogen 1.0 04/17/2017 08:36 PM    Nitrites NEGATIVE  04/17/2017 08:36 PM    Leukocyte Esterase NEGATIVE  04/17/2017 08:36 PM    Epithelial cells FEW 04/17/2017 08:36 PM    Bacteria NEGATIVE  04/17/2017 08:36 PM    WBC 0-4 04/17/2017 08:36 PM    RBC 5-10 04/17/2017 08:36 PM         Medications Reviewed:     Current Facility-Administered Medications   Medication Dose Route Frequency    dextrose 5% - 0.45% NaCl with KCl 10 mEq/L infusion  75 mL/hr IntraVENous CONTINUOUS    donepezil (ARICEPT) tablet 5 mg  5 mg Oral QHS    ondansetron (ZOFRAN) injection 4 mg  4 mg IntraVENous Q4H PRN    docusate sodium (COLACE) capsule 100 mg  100 mg Oral BID    pantoprazole (PROTONIX) 40 mg in sodium chloride 0.9 % 10 mL injection  40 mg IntraVENous DAILY    0.9% sodium chloride infusion 250 mL  250 mL IntraVENous PRN    0.9% sodium chloride infusion 250 mL  250 mL IntraVENous PRN    traMADol (ULTRAM) tablet 50 mg  50 mg Oral Q6H PRN    acetaminophen (TYLENOL) tablet 500 mg  500 mg Oral Q6H PRN    morphine injection 1-2 mg  1-2 mg IntraVENous Q3H PRN     ______________________________________________________________________  EXPECTED LENGTH OF STAY: 3d 0h  ACTUAL LENGTH OF STAY:          1                 Cleve Wiley MD

## 2017-04-19 NOTE — PROGRESS NOTES
Problem: Falls - Risk of  Goal: *Absence of falls  Outcome: Progressing Towards Goal  Call bell within reach  Patient instructed to ask for assistance to ambulate  Side Rails up X2  Gripper socks given  Bedside table within reach         Bedside and Verbal shift change report given to [address] (oncoming nurse) by Jitendra Bush (offgoing nurse). Report included the following information SBAR, Kardex, Intake/Output, MAR, Recent Results and Cardiac Rhythm NSR.

## 2017-04-19 NOTE — WOUND CARE
Wound Consult:  New Patient Visit. Chart reviewed. Spoke with patients nurseKhloe to hand off on visit; in with staff to turn patient off left side - has fracture that causes pain - noted ortho note - admitted with pressure injury to sacrum. Patient is resting on a total care bed with foam mattress. Pain level not quantified; request for pain med handed off. Assessment:  1. Sacrum - 6.0 x 4.0 x 0.1 cm, full thickness injury with 70% yellow base / 30% pink therefore cannot definitively Stage as 3 or 4 until more slough out of base - Unstageable at this time. No odor, no purulence; painful for patient; surrounding skin irritated with some discoloration. POA. Right medial forefoot - blanching redness of intact skin. Ecchymosis in areas on left hip/lateral thigh; some on lower leg and at ankle/gaiter area as well with edema in this leg. Patient with hematoma/DVT on this leg with fracture. Heels without redness. Patient had a colostomy in past but this has been taken down. Well healed midline abdominal surgical scar and LLQ scar. Treatment:  Cleansed sacral area; placed a DuoDerm dressing. Re-postioned with Sadaf. Floated left heel. Recommendations:  1. Minimize layers of linen/pads under patient to optimize support surface. Called [facility] for air support surface (P500 or SPORT) for patient. 2. Reposition: continue to turn and reposition approximately every 2 hours; float heels or use Prevalon boots. 3. Has st; prompt and routine incontinence care if occurs. 4. Continue to monitor nutrition, pain, and skin risk scale, and skin assessment. 5. Wound Care: Cleanse sacral ulcer with normal saline, pat dry and apply DuoDerm; change every 5 days / sooner if needed. Plan:  Discussed with Dr. Blake Labor - orders obtained and written for care. We will continue to reassess routinely and as needed.   Augustine Alves, [address] [facility] Office [phone]  Pager [phone]

## 2017-04-19 NOTE — PROGRESS NOTES
Reviewed chart. [facility]; can admit when patient ready for discharge. CRM following for completion of d/c plan. Kranthi Serra LCSW, [place]  Attended rounds. Anticipate possible readiness for transfer to snf tomorrow. Have notified [facility] [phone].   Kranthi Serra LCSW, CCM

## 2017-04-19 NOTE — PROGRESS NOTES
Bedside and Verbal shift change report given to Yrn Garcia (oncoming nurse) by Carl Rothman (offgoing nurse). Report included the following information SBAR, Kardex, ED Summary, Intake/Output, MAR, Recent Results, Med Rec Status and Cardiac Rhythm Sinus Rhythm.

## 2017-04-20 NOTE — PROGRESS NOTES
Problem: Mobility Impaired (Adult and Pediatric)  Goal: *Acute Goals and Plan of Care (Insert Text)  Physical Therapy Goals  Initiated 4/20/2017  1. Patient will move from supine to sit and sit to supine , scoot up and down and roll side to side in bed with minimal assistance/contact guard assist within 7 day(s). 2. Patient will transfer from bed to chair and chair to bed with minimal assistance/contact guard assist using the least restrictive device within 7 day(s). 3. Patient will perform sit to stand with minimal assistance/contact guard assist within 7 day(s). 4. Patient will ambulate with minimal assistance/contact guard assist for 100 feet with the least restrictive device within 7 day(s). PHYSICAL THERAPY EVALUATION  Patient: Ac Adrian (80 y.o. female)  Date: 4/20/2017  Primary Diagnosis: Hip fracture requiring operative repair, left, closed, initial encounter        Precautions:   Fall, WBAT (L LE)      ASSESSMENT :  Based on the objective data described below, the patient presents with confused state, oriented to self and 2017 only but pleasant and conversational. Agreeable to eval. Physician present and assisted with bed mobility, pt at a max assist to total assist X 2. Note good sitting balance once at EOB with VSS. Pt agreeable to trying to stand and with max assist briefly stood to a rolling walker. She reports that she could not remember if her leg hurt her when she stood up. She did take a small amount of weight on her left leg. The only significant pain reaction was with bed mobility. Anticipate need for SNF. Per chart per ortho likely to not have surgical intervention for hip fx. Patient will benefit from skilled intervention to address the above impairments.   Patients rehabilitation potential is considered to be Fair  Factors which may influence rehabilitation potential include:   [ ]         None noted  [X]         Mental ability/status  [X]         Medical condition  [X] Home/family situation and support systems  [ ]         Safety awareness  [ ]         Pain tolerance/management  [X]         Other: history of falls       PLAN :  Recommendations and Planned Interventions:  [X]           Bed Mobility Training             [ ]    Neuromuscular Re-Education  [X]           Transfer Training                   [ ]    Orthotic/Prosthetic Training  [X]           Gait Training                         [ ]    Modalities  [X]           Therapeutic Exercises           [ ]    Edema Management/Control  [X]           Therapeutic Activities            [X]    Patient and Family Training/Education  [ ]           Other (comment):     Frequency/Duration: Patient will be followed by physical therapy  3 times a week to address goals. Discharge Recommendations: Skilled Nursing Facility  Further Equipment Recommendations for Discharge: none       SUBJECTIVE:   Patient stated I don't remember, when asked if she was in pain while we were together      OBJECTIVE DATA SUMMARY:   Consult received, chart reviewed, pt cleared by nursing. HISTORY:    Past Medical History:   Diagnosis Date    Alzheimer's dementia       Diagnosed while in rehab recovering from colon resection.  Chronic pain       lower back    History of colonic polyps       Tubular adenoma excised colonoscopically on 2016.  Hypertension      Lipoma of colon       Resected on 2016. Past Surgical History:   Procedure Laterality Date    HX  SECTION        HX COLONOSCOPY   2016     Dr. Sam Lees.  HX OTHER SURGICAL   2016     Reduction of intussusception, sigmoid colon resection, and creation of end colostomy and Osito's pouch to treat colonic obstruction secondary to colorectal intussusception of a sigmoid colon lipoma; Dr. Sam Lees. Prior Level of Function/Home Situation: pt is a poor historian, per chart uses a walker  Personal factors and/or comorbidities impacting plan of care: dementia. Likely not to have surgical intervention of hip fx. DVT  Unable to obtain home info from pt. EXAMINATION/PRESENTATION/DECISION MAKING:   Critical Behavior:  Neurologic State: Alert, Confused  Orientation Level: Disoriented to situation  Cognition: Follows commands  Safety/Judgement: Fall prevention  Hearing: Auditory  Auditory Impairment: None  Skin:  Refer to MD and nursing notes  Edema: refer to MD and nursing notes, LLE  Range Of Motion:  AROM: Generally decreased, functional                       Strength:    Strength: Generally decreased, functional                    Tone & Sensation:   Tone: Normal              Sensation: Intact (per pt report)               Coordination:  Coordination: Generally decreased, functional  Vision:   Acuity:  (Unble to read numbers off the board; may be language barrier)  Functional Mobility:  Bed Mobility:     Supine to Sit: Assist x2;Maximum assistance  Sit to Supine: Assist x2;Total assistance     Transfers:  Sit to Stand: Assist x1;Maximum assistance  Stand to Sit: Assist x1;Maximum assistance                       Balance:   Sitting: Intact  Standing: Impaired  Standing - Static: Constant support;Poor  Standing - Dynamic : Poor  Ambulation/Gait Training:                                                                               Stairs:                           Therapeutic Exercises:         Functional Measure:  Tinetti test:      Sitting Balance: 1  Arises: 0  Attempts to Rise: 0  Immediate Standing Balance: 0  Standing Balance: 0  Nudged: 0  Eyes Closed: 0  Turn 360 Degrees - Continuous/Discontinuous: 0  Turn 360 Degrees - Steady/Unsteady: 0  Sitting Down: 0  Balance Score: 1  Indication of Gait: 0  R Step Length/Height: 0  L Step Length/Height: 0  R Foot Clearance: 0  L Foot Clearance: 0  Step Symmetry: 0  Step Continuity: 0  Path: 0  Trunk: 0  Walking Time: 0  Gait Score: 0  Total Score: 1         Tinetti Test and G-code impairment scale:  Percentage of Impairment CH     0%    CI     1-19% CJ     20-39% CK     40-59% CL     60-79% CM     80-99% CN      100%   Tinetti  Score 0-28 28 23-27 17-22 12-16 6-11 1-5 0          Tinetti Tool Score Risk of Falls  <19 = High Fall Risk  19-24 = Moderate Fall Risk  25-28 = Low Fall Risk  Tinetti ME. Performance-Oriented Assessment of Mobility Problems in Elderly Patients. Carbajal 68; [id number]. (Scoring Description: PT Bulletin Feb. 10, 1993)     Older adults: Perry Robledo et al, 2009; n = 1000 [facility] elderly evaluated with ABC, HEIDE, ADL, and IADL)  · Mean HEIDE score for males aged 69-68 years = 26.21(3.40)  · Mean HEIDE score for females age 69-68 years = 25.16(4.30)  · Mean HEIDE score for males over 80 years = 23.29(6.02)  · Mean HEIDE score for females over 80 years = 17.20(8.32)         G codes: In compliance with CMSs Claims Based Outcome Reporting, the following G-code set was chosen for this patient based on their primary functional limitation being treated: The outcome measure chosen to determine the severity of the functional limitation was the Tinetti with a score of 1/28 which was correlated with the impairment scale.       · Mobility - Walking and Moving Around:               - CURRENT STATUS:    CM - 80%-99% impaired, limited or restricted               - GOAL STATUS:           CL - 60%-79% impaired, limited or restricted               - D/C STATUS:                       ---------------To be determined---------------      Physical Therapy Evaluation Charge Determination   History Examination Presentation Decision-Making   MEDIUM  Complexity : 1-2 comorbidities / personal factors will impact the outcome/ POC  MEDIUM Complexity : 3 Standardized tests and measures addressing body structure, function, activity limitation and / or participation in recreation  LOW Complexity : Stable, uncomplicated  LOW Complexity : FOTO score of       Based on the above components, the patient evaluation is determined to be of the following complexity level: LOW      Pain:  Pain Scale 1: Numeric (0 - 10)  Pain Intensity 1: 0              Activity Tolerance:   VSS  Please refer to the flowsheet for vital signs taken during this treatment. After treatment:   [ ]         Patient left in no apparent distress sitting up in chair  [X]         Patient left in no apparent distress in bed  [X]         Call bell left within reach  [X]         Nursing notified  [ ]         Caregiver present  [ ]         Bed alarm activated      COMMUNICATION/EDUCATION:   The patients plan of care was discussed with: Registered Nurse and Physician.  [X]         Fall prevention education was provided and the patient/caregiver indicated understanding. [X]         Patient/family have participated as able in goal setting and plan of care. [X]         Patient/family agree to work toward stated goals and plan of care. [ ]         Patient understands intent and goals of therapy, but is neutral about his/her participation. [ ]         Patient is unable to participate in goal setting and plan of care.      Thank you for this referral.  Telma Broderick   Time Calculation: 33 mins

## 2017-04-20 NOTE — PROGRESS NOTES
Hospitalist Progress Note  Murali Asher MD  Office: [phone]        Date of Service:  2017  NAME:  Bob Woodard  :  1935  MRN:  [id number]      Admission Summary:   Per HPI     \"This is a 79-year-old woman with a past medical history significant for hypertension, Alzheimer   dementia, who was in her usual state of health until about 2 weeks ago when she developed left leg swelling. The patient also has inability to bear weight on the left leg. Also, for the past couple of   weeks, it was reported that the patient has been falling down, what was described as syncopal episode. The patient was also seen by the neurologist. According to report, the neurologist referred the patient to   the cardiologist for further evaluation of this episode of syncope. The patient is not able to tell whether she was having pain in the left leg because of significant Alzheimer dementia. She was subsequently   brought to the emergency room today. When the patient arrived at the emergency room, x-ray of the hip shows a left hip fracture. Orthopedic service on-call was consulted by the emergency room physician. Admission to the hospitalist service was advised. The patient was then referred to the hospitalist service for evaluation for admission. The patient has no fever, no rigors, no chills. Also, it is not clear when the   patient actually sustained the hip fracture. \"      Interval history / Subjective:      Ms.Thomee ROSANGELA Sánchezangles alert and awake,conversant. She was working with physical therapy,doing wore that is expected. Assessment & Plan:     Left hip,lef proxima fibular  fracture likely due to fall  -Pain control  -Ortho consulted. They think the hip fracture is subacute. Planning to treat this concervatively  -d/c st  -pt /ot    Left leg acute DVT  -Hold anticoagulate for now due to severe anemia  -Temporary IVC filter placed,   -No GI bleed, HB stable. -4/20, started on eliquis,monitor Hb.IF HB drops, need to stop. She has removable IVC filter. .    Acute blood loss anemia,suspect bleeding due to the farcture  -HB 6.6  -Received 2 units. Hb 8-9. Stool occult negative  -No evidence of bleeding. Reported recurrent syncope  -Head CT negative  -Echocardiogram is normal  -Consulted cardiology     Hypernatremia: due to poor oral intake  -improving. switch to hypotonic fluid.  -Start diet    Dehydration: continue iv hydration  -improving     Alzheimer dementia. Hypertension,would resume home BB when able  Thrombocytosis due to dehydration, improving with fluids. Code status: full  DVT prophylaxis: scd/icd placed. Care Plan discussed with:Nurse. Disposition: she is going to need rehab/snf on discharge     Hospital Problems  Date Reviewed: 4/18/2017          Codes Class Noted POA    Hip fracture requiring operative repair [facility]) ICD-10-CM: A76.150S  ICD-9-CM: 820.8  4/18/2017 Unknown        * (Principal)Closed intertrochanteric fracture of left femur [facility]) ICD-10-CM: V83.500Y  ICD-9-CM: 820.21  4/17/2017 Unknown                Review of Systems:   Pertinent items are noted in HPI. Vital Signs:    Last 24hrs VS reviewed since prior progress note. Most recent are:  Visit Vitals    /74 (BP 1 Location: Left arm, BP Patient Position: At rest)    Pulse (!) 107    Temp 98.8 °F (37.1 °C)    Resp 20    Ht 5' (1.524 m)    Wt 46 kg (101 lb 6.6 oz)    SpO2 97%    BMI 19.81 kg/m2         Intake/Output Summary (Last 24 hours) at 04/20/17 1621  Last data filed at 04/20/17 1618   Gross per 24 hour   Intake              700 ml   Output             1500 ml   Net             -800 ml        Physical Examination:             Constitutional:  Demented,confused. ENT:  Oral mucous moist, oropharynx benign. Neck supple,    Resp:  CTA bilaterally. No wheezing/rhonchi/rales.  No accessory muscle use   CV:  Regular rhythm, normal rate, no murmurs, gallops, rubs    GI:  Soft, non distended, non tender. normoactive bowel sounds, no hepatosplenomegaly     Musculoskeletal:  Swollen entire left leg. Neurologic:  Awake,demented. Data Review:    Review and/or order of clinical lab test  Review and/or order of tests in the radiology section of [facility]  Review and/or order of tests in the medicine section of [facility]      Labs:     Recent Labs      04/20/17   0329 04/19/17   0321   WBC  8.8  7.2   HGB  9.5*  9.7*   HCT  29.9*  30.8*   PLT  419*  438*     Recent Labs      04/20/17   0329  04/19/17   0321  04/18/17   1404  04/18/17   0606   NA  145  152*  152*  153*   K  3.6  3.7  3.6  3.9   CL  111*  119*  119*  121*   CO2  28  26  28  27   BUN  17  29*  41*  46*   CREA  0.47*  0.43*  0.48*  0.47*   GLU  88  95  88  104*   CA  7.9*  8.0*  7.7*  7.8*   MG   --    --    --   2.6*   PHOS   --   2.9   --    --      Recent Labs      04/20/17   0329  04/18/17   0606   SGOT  24  29   ALT  24  26   AP  189*  136*   TBILI  0.6  0.5   TP  5.2*  4.6*   ALB  1.9*  1.9*   GLOB  3.3  2.7     No results for input(s): INR, PTP, APTT in the last 72 hours. No lab exists for component: INREXT, INREXT   Recent Labs      04/18/17   0606   FE  34*  35   TIBC  205*   PSAT  17*   FERR  178      Lab Results   Component Value Date/Time    Folate 11.8 04/18/2017 06:06 AM      No results for input(s): PH, PCO2, PO2 in the last 72 hours.   Recent Labs      04/18/17   0606   CPK  211*   CKNDX  1.9   TROIQ  0.07*     No results found for: CHOL, CHOLX, CHLST, CHOLV, HDL, LDL, DLDL, LDLC, DLDLP, TGL, TGLX, TRIGL, TRIGP, CHHD, CHHDX  Lab Results   Component Value Date/Time    Glucose (POC) 121 04/18/2017 12:46 AM     Lab Results   Component Value Date/Time    Color YELLOW/STRAW 04/17/2017 08:36 PM    Appearance CLOUDY 04/17/2017 08:36 PM    Specific gravity 1.027 04/17/2017 08:36 PM    pH (UA) 5.5 04/17/2017 08:36 PM    Protein TRACE 04/17/2017 08:36 PM    Glucose NEGATIVE  04/17/2017 08:36 PM    Ketone NEGATIVE  04/17/2017 08:36 PM    Bilirubin NEGATIVE  06/09/2016 03:35 PM    Urobilinogen 1.0 04/17/2017 08:36 PM    Nitrites NEGATIVE  04/17/2017 08:36 PM    Leukocyte Esterase NEGATIVE  04/17/2017 08:36 PM    Epithelial cells FEW 04/17/2017 08:36 PM    Bacteria NEGATIVE  04/17/2017 08:36 PM    WBC 0-4 04/17/2017 08:36 PM    RBC 5-10 04/17/2017 08:36 PM         Medications Reviewed:     Current Facility-Administered Medications   Medication Dose Route Frequency    apixaban (ELIQUIS) tablet 10 mg  10 mg Oral BID    [START ON 4/27/2017] apixaban (ELIQUIS) tablet 5 mg  5 mg Oral BID    dextrose 5% - 0.45% NaCl with KCl 10 mEq/L infusion  75 mL/hr IntraVENous CONTINUOUS    donepezil (ARICEPT) tablet 5 mg  5 mg Oral QHS    ondansetron (ZOFRAN) injection 4 mg  4 mg IntraVENous Q4H PRN    docusate sodium (COLACE) capsule 100 mg  100 mg Oral BID    pantoprazole (PROTONIX) 40 mg in sodium chloride 0.9 % 10 mL injection  40 mg IntraVENous DAILY    0.9% sodium chloride infusion 250 mL  250 mL IntraVENous PRN    0.9% sodium chloride infusion 250 mL  250 mL IntraVENous PRN    traMADol (ULTRAM) tablet 50 mg  50 mg Oral Q6H PRN    acetaminophen (TYLENOL) tablet 500 mg  500 mg Oral Q6H PRN    morphine injection 1-2 mg  1-2 mg IntraVENous Q3H PRN     ______________________________________________________________________  EXPECTED LENGTH OF STAY: 3d 0h  ACTUAL LENGTH OF STAY:          2                 Aldo Del Cid MD

## 2017-04-20 NOTE — PROGRESS NOTES
Problem: Self Care Deficits Care Plan (Adult)  Goal: *Acute Goals and Plan of Care (Insert Text)  Occupational Therapy Goals  Initiated 4/20/2017     1. Patient will perform lower body dressing using AE at moderate assistance level within 7 days. 2. Patient will perform toilet transfers at moderate assistance level x2 using Walkers, Type: Rolling Walker within 7 days. 3. Patient will complete grooming sitting EOB with supervision/setup within 7 days. 4. Patient will complete UB ADLs with supervision/setup without cues within 7 days. 5. Patient will tolerate standing X 3 mins in preparation for standing ADLs with MIN A within 7 days. OCCUPATIONAL THERAPY EVALUATION  Patient: Jay Trinidad (80 y.o. female)  Date: 4/20/2017  Primary Diagnosis: Hip fracture requiring operative repair, left, closed, initial encounter        Precautions:   Fall, WBAT (L LE)     *Pt cleared for therapy by RN/MD (+) for L LE DVT   ASSESSMENT :  Based on the objective data described below, the patient presents with generalized weakness, multiple medical issues with L hip fracture, hx of multiple falls/syncopal episodes and  L LE DVT (cleared for therapy but may receive IVC filter; minimal activity today) all of which impact patients ability to complete ADLs independently. Pt with hx of dementia and is unreliable historian and functional baseline is unknown however she does ambulate and lives with family per chart. Pt is below her functional baseline and would benefit from further OT to increase independence with ADLs and functional transfers. L hip fracture is non operative treatment at this time and pt will benefit from discharge to SNF to regain strength/mobility. Recommend follow up with family for functional baseline in ADLs and sitting EOB next session.        Recommend with nursing patient to complete as able in order to maintain strength, endurance and independence: Chair position 3x/day, feeding/grooming with supervision/setup and use of bedpan for toileting. Thank you for your assistance. Patient will benefit from skilled intervention to address the above impairments. Patients rehabilitation potential is considered to be Guarded  Factors which may influence rehabilitation potential include:   [ ]             None noted  [X]             Mental ability/status  [X]             Medical condition  [ ]             Home/family situation and support systems  [X]             Safety awareness  [ ]             Pain tolerance/management  [ ]             Other:        PLAN :  Recommendations and Planned Interventions:  [X]               Self Care Training                  [X]        Therapeutic Activities  [X]               Functional Mobility Training    [ ]        Cognitive Retraining  [X]               Therapeutic Exercises           [X]        Endurance Activities  [X]               Balance Training                   [ ]        Neuromuscular Re-Education  [ ]               Visual/Perceptual Training     [X]   Home Safety Training  [X]               Patient Education                 [X]        Family Training/Education  [ ]               Other (comment):     Frequency/Duration: Patient will be followed by occupational therapy 3 times a week to address goals. Discharge Recommendations: Skilled Nursing Facility  Further Equipment Recommendations for Discharge: TBD       SUBJECTIVE:   Patient stated \"I live with my mother and she helps take care of me (pt is 80years old).       OBJECTIVE DATA SUMMARY:   HISTORY:   Past Medical History:   Diagnosis Date    Alzheimer's dementia       Diagnosed while in rehab recovering from colon resection.  Chronic pain       lower back    History of colonic polyps       Tubular adenoma excised colonoscopically on 2016.  Hypertension      Lipoma of colon       Resected on 2016.      Past Surgical History:   Procedure Laterality Date    HX  SECTION        HX COLONOSCOPY   4/20/2016     Dr. Cynthia Roy.  HX OTHER SURGICAL   1/5/2016     Reduction of intussusception, sigmoid colon resection, and creation of end colostomy and Osito's pouch to treat colonic obstruction secondary to colorectal intussusception of a sigmoid colon lipoma; Dr. Cynthia Roy. Prior Level of Function/Home Situation: Pt unreliable historian and limited information in chart review. Pt reports she lives in a 2 story home, does the stairs for exercise and is IND for ambulation. She is unsure if she has a walker at home or not. Pt states she is IND for bathing/dressing. Chart does indicate pt lives with family. Expanded or extensive additional review of patient history: significant for dementia, L hip fracture (non operative treatment), multiple falls at home and L LE DVT        [X]  Right hand dominant             [ ]  Left hand dominant     EXAMINATION OF PERFORMANCE DEFICITS:  Cognitive/Behavioral Status:  Neurologic State: Alert;Confused  Orientation Level: Disoriented to situation  Cognition: Follows commands  Perception: Appears intact  Perseveration: No perseveration noted  Safety/Judgement: Fall prevention     Skin: intact     Edema: L LE     Hearing: Auditory  Auditory Impairment: None     Vision/Perceptual:            Acuity:  (Unble to read numbers off the board; may be language barrier)       Range of Motion:  AROM: Generally decreased, functional     Strength:  Strength: Generally decreased, functional     Coordination:  Coordination: Generally decreased, functional  Fine Motor Skills-Upper: Left Intact; Right Intact    Gross Motor Skills-Upper: Left Intact; Right Intact     Tone & Sensation:  Tone: Normal  Sensation: Intact (per pt report)     Balance:  Sitting: Intact  Standing: Impaired  Standing - Static: Constant support;Poor  Standing - Dynamic : Poor     Functional Mobility and Transfers for ADLs:  Bed Mobility:  Supine to Sit: Assist x2;Maximum assistance  Sit to Supine: Assist x2;Total assistance     Transfers:  Sit to Stand: Assist x1;Maximum assistance  Stand to Sit: Assist x1;Maximum assistance  Toilet Transfer :  (NT)     ADL Assessment:  Feeding: Setup     Oral Facial Hygiene/Grooming: Setup     Bathing: Maximum assistance     Upper Body Dressing: Minimum assistance     Lower Body Dressing: Total assistance     Toileting: Total assistance     ADL Intervention and task modifications:  Feeding  Feeding Assistance: Supervision/set-up  Container Management: Total assistance (dependent) (assist to open sugar packets)  Food to Mouth: Independent  Drink to Mouth: Independent      Cognitive Retraining  Safety/Judgement: Fall prevention     Functional Measure:  Barthel Index:      Bathin  Bladder: 0  Bowels: 5  Groomin  Dressin  Feedin  Mobility: 0  Stairs: 0  Toilet Use: 0  Transfer (Bed to Chair and Back): 5  Total: 20         Barthel and G-code impairment scale:  Percentage of impairment CH  0% CI  1-19% CJ  20-39% CK  40-59% CL  60-79% CM  80-99% CN  100%   Barthel Score 0-100 100 99-80 79-60 59-40 20-39 1-19    0   Barthel Score 0-20 20 17-19 13-16 9-12 5-8 1-4 0      The Barthel ADL Index: Guidelines  1. The index should be used as a record of what a patient does, not as a record of what a patient could do. 2. The main aim is to establish degree of independence from any help, physical or verbal, however minor and for whatever reason. 3. The need for supervision renders the patient not independent. 4. A patient's performance should be established using the best available evidence. Asking the patient, friends/relatives and nurses are the usual sources, but direct observation and common sense are also important. However direct testing is not needed. 5. Usually the patient's performance over the preceding 24-48 hours is important, but occasionally longer periods will be relevant.   6. Middle categories imply that the patient supplies over 50 per cent of the effort. 7. Use of aids to be independent is allowed. Cleave Dome., Barthel, D.W. (1138). Functional evaluation: the Barthel Index. [address] [facility] (14)2. EAMON Joseph, Avinash Velez., Corey Baum., [facility], [address] [facility] (1999). Measuring the change indisability after inpatient rehabilitation; comparison of the responsiveness of the Barthel Index and Functional [place] Measure. Journal of Neurology, Neurosurgery, and Psychiatry, 66(4), 780-855. WARD Renteria, LAZARO Iverson, & Marline Maki M.A. (2004.) Assessment of post-stroke quality of life in cost-effectiveness studies: The usefulness of the Barthel Index and the EuroQoL-5D. Quality of Life Research, 13, 407-15         G codes: In compliance with CMSs Claims Based Outcome Reporting, the following G-code set was chosen for this patient based on their primary functional limitation being treated: The outcome measure chosen to determine the severity of the functional limitation was the Barthel Index with a score of 20/100 which was correlated with the impairment scale. · Self Care:               - CURRENT STATUS:    CL - 60%-79% impaired, limited or restricted               - GOAL STATUS:           CJ - 20%-39% impaired, limited or restricted               - D/C STATUS:                       ---------------To be determined---------------      Occupational Therapy Evaluation Charge Determination   History Examination Decision-Making   MEDIUM Complexity : Expanded review of history including physical, cognitive and psychosocial  history  HIGH Complexity : 5 or more performance deficits relating to physical, cognitive , or psychosocial skils that result in activity limitations and / or participation restrictions HIGH Complexity : Patient presents with comorbidities that affect occupational performance.  Signifigant modification of tasks or assistance (eg, physical or verbal) with assessment (s) is necessary to enable patient to complete evaluation       Based on the above components, the patient evaluation is determined to be of the following complexity level: MEDIUM  Pain:  Pain Scale 1: Numeric (0 - 10)  Pain Intensity 1: 0              Activity Tolerance:   No reports of pain, pleasantly confused. Tolerated chair position     Please refer to the flowsheet for vital signs taken during this treatment. After treatment:   [ ] Patient left in no apparent distress sitting up in chair  [X] Patient left in no apparent distress in bed/chair position  [X] Call bell left within reach  [X] Nursing notified  [ ] Caregiver present  [ ] Bed alarm activated      COMMUNICATION/EDUCATION:   The patients plan of care was discussed with: Physical Therapist and Registered Nurse. [ ] Home safety education was provided and the patient/caregiver indicated understanding. [ ] Patient/family have participated as able in goal setting and plan of care. [ ] Patient/family agree to work toward stated goals and plan of care. [ ] Patient understands intent and goals of therapy, but is neutral about his/her participation. [X] Patient is unable to participate in goal setting and plan of care. This patients plan of care is appropriate for delegation to [facility] [place].      Thank you for this referral.  Lb García, OT  Time Calculation: 23 mins

## 2017-04-21 NOTE — PROGRESS NOTES
Hospitalist Progress Note  Lorena Ryan MD  Office: [phone]  Cell: [phone]        Date of Service:  2017  NAME:  Hallie Ochoa  :  1935  MRN:  [id number]      Admission Summary: This is a 81-year-old woman with a past medical history significant for hypertension, Alzheimer   dementia, who was in her usual state of health until about 2 weeks ago when she developed left leg swelling. The patient also has inability to bear weight on the left leg. Also, for the past couple of   weeks, it was reported that the patient has been falling down, what was described as syncopal episode. The patient was also seen by the neurologist. According to report, the neurologist referred the patient to   the cardiologist for further evaluation of this episode of syncope. The patient is not able to tell whether she was having pain in the left leg because of significant Alzheimer dementia. She was subsequently   brought to the emergency room today. When the patient arrived at the emergency room, x-ray of the hip shows a left hip fracture. Orthopedic service on-call was consulted by the emergency room physician. Admission to the hospitalist service was advised. The patient was then referred to the hospitalist service for evaluation for admission. The patient has no fever, no rigors, no chills. Also, it is not clear when the   patient actually sustained the hip fracture. \"      Interval history / Subjective:      Ms.Thomee ROSANGELA Amors alert and awake,conversant. Assessment & Plan:     Left hip,left  proximal fibular  fracture likely due to fall  -Pain control  -Ortho consulted. They think the hip fracture is subacute. Planning to treat this concervatively  -d/c st  -pt /ot    Left leg acute DVT  -Held anticoagulation initially due to severe anemia  -Temporary IVC filter placed,   -No GI bleed, HB stable.   -, started on eliquis,monitor Hb. IF HB drops, need to stop. She has removable IVC filter. With the history of dementia and recent falls I feel that the patient should be off anticoagulation. Tried to reach the patient's PMD as well as mPOA on phone but could not. Will let PMD address that as an outpatient. Will not discharge the patient on any aticoagulation    Acute blood loss anemia,suspect bleeding due to the farcture  -HB 6.6  -Received 2 units. Hb 8-9. Stool occult negative  -No evidence of bleeding.  -now h/h stable      Reported recurrent syncope  -Head CT negative  -Echocardiogram is normal  -Consulted cardiology, suggested very anemic and low normal BP likely cause of syncope. Cardiology signed off    Hypernatremia: due to poor oral intake  -improving. switch to hypotonic fluid.  -Start diet    Dehydration: continue iv hydration  -improving     Alzheimer dementia. Hypertension,would resume home BB when able  Thrombocytosis due to dehydration, improving with fluids. PT/OT SNF      Code status: full  DVT prophylaxis: scd/icd placed. PTA: Lives with nephew and his wife    Plan: Discharge today to [facility] discussed with:Nurse. Disposition: she is going to need rehab/snf on discharge     Hospital Problems  Date Reviewed: 4/18/2017          Codes Class Noted POA    Hip fracture requiring operative repair [facility]) ICD-10-CM: K17.082D  ICD-9-CM: 820.8  4/18/2017 Unknown        * (Principal)Closed intertrochanteric fracture of left femur [facility]) ICD-10-CM: G73.207I  ICD-9-CM: 820.21  4/17/2017 Unknown                Review of Systems:   Pertinent items are noted in HPI. Vital Signs:    Last 24hrs VS reviewed since prior progress note.  Most recent are:  Visit Vitals    /76 (BP 1 Location: Left arm, BP Patient Position: At rest)    Pulse (!) 103    Temp 97.3 °F (36.3 °C)    Resp 18    Ht 5' (1.524 m)    Wt 47.1 kg (103 lb 13.4 oz)    SpO2 95%    BMI 20.28 kg/m2         Intake/Output Summary (Last 24 hours) at 04/21/17 0855  Last data filed at 04/20/17 2004   Gross per 24 hour   Intake             1600 ml   Output             1000 ml   Net              600 ml        Physical Examination:             Constitutional:  Demented,confused. ENT:  Oral mucous moist, oropharynx benign. Neck supple,    Resp:  CTA bilaterally. No wheezing/rhonchi/rales. No accessory muscle use   CV:  Regular rhythm, normal rate, no murmurs, gallops, rubs    GI:  Soft, non distended, non tender. normoactive bowel sounds, no hepatosplenomegaly     Musculoskeletal:  Swollen entire left leg. Neurologic:  Awake,demented. Data Review:    Review and/or order of clinical lab test  Review and/or order of tests in the radiology section of CPT  Review and/or order of tests in the medicine section of CPT      Labs:     Recent Labs      04/20/17 0329 04/19/17 0321   WBC  8.8  7.2   HGB  9.5*  9.7*   HCT  29.9*  30.8*   PLT  419*  438*     Recent Labs      04/20/17 0329 04/19/17 0321 04/18/17   1404   NA  145  152*  152*   K  3.6  3.7  3.6   CL  111*  119*  119*   CO2  28  26  28   BUN  17  29*  41*   CREA  0.47*  0.43*  0.48*   GLU  88  95  88   CA  7.9*  8.0*  7.7*   PHOS   --   2.9   --      Recent Labs      04/20/17 0329   SGOT  24   ALT  24   AP  189*   TBILI  0.6   TP  5.2*   ALB  1.9*   GLOB  3.3     No results for input(s): INR, PTP, APTT in the last 72 hours. No lab exists for component: INREXT, INREXT   No results for input(s): FE, TIBC, PSAT, FERR in the last 72 hours. Lab Results   Component Value Date/Time    Folate 11.8 04/18/2017 06:06 AM      No results for input(s): PH, PCO2, PO2 in the last 72 hours. No results for input(s): CPK, CKNDX, TROIQ in the last 72 hours.     No lab exists for component: CPKMB  No results found for: CHOL, CHOLX, CHLST, CHOLV, HDL, LDL, DLDL, LDLC, DLDLP, TGL, TGLX, TRIGL, TRIGP, CHHD, CHHDX  Lab Results   Component Value Date/Time    Glucose (POC) 121 04/18/2017 12:46 AM Lab Results   Component Value Date/Time    Color YELLOW/STRAW 04/17/2017 08:36 PM    Appearance CLOUDY 04/17/2017 08:36 PM    Specific gravity 1.027 04/17/2017 08:36 PM    pH (UA) 5.5 04/17/2017 08:36 PM    Protein TRACE 04/17/2017 08:36 PM    Glucose NEGATIVE  04/17/2017 08:36 PM    Ketone NEGATIVE  04/17/2017 08:36 PM    Bilirubin NEGATIVE  06/09/2016 03:35 PM    Urobilinogen 1.0 04/17/2017 08:36 PM    Nitrites NEGATIVE  04/17/2017 08:36 PM    Leukocyte Esterase NEGATIVE  04/17/2017 08:36 PM    Epithelial cells FEW 04/17/2017 08:36 PM    Bacteria NEGATIVE  04/17/2017 08:36 PM    WBC 0-4 04/17/2017 08:36 PM    RBC 5-10 04/17/2017 08:36 PM         Medications Reviewed:     Current Facility-Administered Medications   Medication Dose Route Frequency    sodium chloride (NS) 0.9 % flush        apixaban (ELIQUIS) tablet 10 mg  10 mg Oral BID    [START ON 4/27/2017] apixaban (ELIQUIS) tablet 5 mg  5 mg Oral BID    dextrose 5% - 0.45% NaCl with KCl 10 mEq/L infusion  75 mL/hr IntraVENous CONTINUOUS    donepezil (ARICEPT) tablet 5 mg  5 mg Oral QHS    ondansetron (ZOFRAN) injection 4 mg  4 mg IntraVENous Q4H PRN    docusate sodium (COLACE) capsule 100 mg  100 mg Oral BID    pantoprazole (PROTONIX) 40 mg in sodium chloride 0.9 % 10 mL injection  40 mg IntraVENous DAILY    0.9% sodium chloride infusion 250 mL  250 mL IntraVENous PRN    0.9% sodium chloride infusion 250 mL  250 mL IntraVENous PRN    traMADol (ULTRAM) tablet 50 mg  50 mg Oral Q6H PRN    acetaminophen (TYLENOL) tablet 500 mg  500 mg Oral Q6H PRN    morphine injection 1-2 mg  1-2 mg IntraVENous Q3H PRN     ______________________________________________________________________  EXPECTED LENGTH OF STAY: 3d 0h  ACTUAL LENGTH OF STAY:          3                 José Velazquez MD

## 2017-04-21 NOTE — PROGRESS NOTES
Problem: Falls - Risk of  Goal: *Absence of falls  Outcome: Progressing Towards Goal  Pt has not fallen. Call bell and belongings in reach, room near nurses' station.

## 2017-04-21 NOTE — DISCHARGE INSTRUCTIONS
Discharge SNF/Rehab Instructions/LTAC       PATIENT ID: Gertrude Eng  MRN: [id number]   YOB: 1935    DATE OF ADMISSION: 4/17/2017  6:43 PM    DATE OF DISCHARGE: 4/21/2017    PRIMARY CARE PROVIDER: Stephy Hood MD       ATTENDING PHYSICIAN: Dontrell Landin MD  DISCHARGING PROVIDER: Dontrell Landin MD     To contact this individual call [phone] and ask the  to page. If unavailable ask to be transferred the Adult Hospitalist Department. CONSULTATIONS: IP CONSULT TO ORTHOPEDIC SURGERY  IP CONSULT TO HOSPITALIST  IP CONSULT TO CARDIOLOGY  IP CONSULT TO INTERVENTIONAL RADIOLOGY    PROCEDURES/SURGERIES: * No surgery found *    ADMITTING [address] [place] [address] COURSE:   Left hip,left  proximal fibular fracture likely due to fall  -Pain control  -Ortho consulted. They think the hip fracture is subacute. Planning to treat this concervatively  -d/c st  -pt /ot     Left leg acute DVT  -Held anticoagulation initially due to severe anemia  -Temporary IVC filter placed,4/18   -No GI bleed, HB stable. -4/20, started on eliquis (by previous hospitalist). She has removable IVC filter. With the history of dementia and recent falls I feel that the patient should be off anticoagulation. Tried to reach the patient's PMD as well as mPOA on phone but could not. Will let PMD address that as an outpatient. Will not discharge the patient on any anticoagulation     Acute blood loss anemia,suspect bleeding due to the farcture  -HB 6.6  -Received 2 units. Hb 8-9. Stool occult negative  -No evidence of bleeding.  -now h/h stable        Reported recurrent syncope  -Head CT negative  -Echocardiogram is normal  -Consulted cardiology, suggested very anemic and low normal BP likely cause of syncope. Cardiology signed off     Hypernatremia: due to poor oral intake  -improving. switch to hypotonic fluid.  -Start diet     Dehydration: continue iv hydration  -improving      Alzheimer dementia.      Hypertension,would resume home BB when able  Thrombocytosis due to dehydration, improving with fluids.      PT/OT SNF    FULL CODE. Outpatient address with PMD        DISCHARGE DIAGNOSES / PLAN:      1. Left DVT s/p IVC filter. Not on anticoagulation  2. Left hip/left proximal fibular fracture medical management       PENDING TEST RESULTS:   At the time of discharge the following test results are still pending: none    FOLLOW UP APPOINTMENTS:    Follow-up Information     Follow up With Details Comments [address] Hortensia Melendez MD In 3 days  [address] [facility]   [phone]             ADDITIONAL CARE RECOMMENDATIONS:   Fall precautions  Please readdress the issue of anticoagulation/blood thinners with your PMD  Please address the issue of code status with your PMD    DIET: Cardiac Diet    ACTIVITY: Activity as tolerated and as recommended by PT/OT      DISCHARGE MEDICATIONS:   See Medication Reconciliation Form      NOTIFY YOUR PHYSICIAN FOR ANY OF THE FOLLOWING:   Fever over 101 degrees for 24 hours. Chest pain, shortness of breath, fever, chills, nausea, vomiting, diarrhea, change in mentation, falling, weakness, bleeding. Severe pain or pain not relieved by medications. Or, any other signs or symptoms that you may have questions about.     DISPOSITION:    Home With:   OT  PT  HH  RN      x SNF/Inpatient Rehab/LTAC    Independent/assisted living    Hospice    Other:       PATIENT CONDITION AT DISCHARGE:     Functional status   x Poor     Deconditioned     Independent      Cognition     Lucid     Forgetful    x Dementia      Catheters/lines (plus indication)    Don     PICC     PEG    x None      Code status   x  Full code     DNR      PHYSICAL EXAMINATION AT DISCHARGE:  Please see progress note      CHRONIC MEDICAL DIAGNOSES:  Problem List as of 4/21/2017  Date Reviewed: 4/18/2017          Codes Class Noted - Resolved    Hip fracture requiring operative repair [facility]) ICD-10-CM: S72.009A  ICD-9-CM: 820.8  4/18/2017 - Present        * (Principal)Closed intertrochanteric fracture of left femur ([facility] [address] ICD-10-CM: A69.049Q  ICD-9-CM: 820.21  4/17/2017 - Present        Nausea vomiting and diarrhea ICD-10-CM: R11.2, R19.7  ICD-9-CM: 787.91, 787.01  1/19/2017 - Present        Dementia (Chronic) ICD-10-CM: F03.90  ICD-9-CM: 294.20  1/19/2017 - Present        Status post Osito's procedure (HCC) (Chronic) ICD-10-CM: Z93.3  ICD-9-CM: V44.3  6/14/2016 - Present        Hypertension (Chronic) ICD-10-CM: I10  ICD-9-CM: 401.9  6/14/2016 - Present                CDMP Checked:   Yes x     PROBLEM LIST Updated:  Yes x         Signed:   Dontrell Landin MD  4/21/2017  9:12 AM

## 2017-04-21 NOTE — PROGRESS NOTES
Ortho Progress note  2017     Subjective:     Patient resting comfortably. Alert and conversant today. Eating breakfast. hgb stable over last few days. Afebrile, VSS. Don out, voiding. Stood with therapy yesterday    Objective:     Blood pressure 147/76, pulse (!) 103, temperature 97.3 °F (36.3 °C), resp. rate 18, height 5' (1.524 m), weight 47.1 kg (103 lb 13.4 oz), SpO2 95 %. Temp (24hrs), Av.9 °F (37.2 °C), Min:97.3 °F (36.3 °C), Max:100.4 °F (38 °C)      Physical Exam:    Alert to herself. Interactive, follows commands  LLE: diffuse swelling from the hip down. No TTP over the hip of fibula. ROM of hip without pain. Leg shortened and externally rotated. NVI distally    Labs:   Lab Results   Component Value Date/Time    HGB 9.5 2017 03:29 AM         Assessment:     Principal Problem:    Closed intertrochanteric fracture of left femur (HCC) (2017)    Active Problems:    Hip fracture requiring operative repair ([address] (2017)      Subacute L IT fx, with bony callus formation  L proximal fibula fracture    Plan/Recommendations/Medical Decision Making:     PT/OT to work on mobilization/gait training, WBAT to LLE  Will plan to treat fractures non operatively  Cont medical management of DVT  Pain control with tramadol  Dispo: Patient can f/u with Dr. Asia Lucio 3-4 wks after discharge, once medically stable.

## 2017-04-21 NOTE — PROGRESS NOTES
Bedside and Verbal shift change report given to [place] (oncoming nurse) by Jareth Martell (offgoing nurse). Report included the following information SBAR, ED Summary, Procedure Summary, Intake/Output, MAR, Recent Results and Cardiac Rhythm NSR/sinus tach.

## 2017-04-21 NOTE — DISCHARGE SUMMARY
Discharge Summary       PATIENT ID: Hallie Ochoa  MRN: [id number]   YOB: 1935    DATE OF ADMISSION: 4/17/2017  6:43 PM    DATE OF DISCHARGE: 4/21/2017   PRIMARY CARE PROVIDER: Percilla Gaucher, MD     ATTENDING PHYSICIAN: Dr Lorena Ryan  DISCHARGING PROVIDER: Lorena Ryan MD    To contact this individual call [phone] and ask the  to page. If unavailable ask to be transferred the Adult Hospitalist Department. CONSULTATIONS: IP CONSULT TO ORTHOPEDIC SURGERY  IP CONSULT TO HOSPITALIST  IP CONSULT TO CARDIOLOGY  IP CONSULT TO INTERVENTIONAL RADIOLOGY    PROCEDURES/SURGERIES: * No surgery found *    ADMITTING [address] [place] [address] COURSE:   Left hip,left  proximal fibular fracture likely due to fall  -Pain control  -Ortho consulted. They think the hip fracture is subacute. Planning to treat this concervatively  -d/c st  -pt /ot     Left leg acute DVT  -Held anticoagulation initially due to severe anemia  -Temporary IVC filter placed,4/18   -No GI bleed, HB stable. -4/20, started on eliquis (by previous hospitalist). She has removable IVC filter. With the history of dementia and recent falls I feel that the patient should be off anticoagulation. Tried to reach the patient's PMD as well as mPOA on phone but could not. Will let PMD address that as an outpatient. Will not discharge the patient on any anticoagulation     Acute blood loss anemia,suspect bleeding due to the farcture  -HB 6.6  -Received 2 units. Hb 8-9. Stool occult negative  -No evidence of bleeding.  -now h/h stable        Reported recurrent syncope  -Head CT negative  -Echocardiogram is normal  -Consulted cardiology, suggested very anemic and low normal BP likely cause of syncope. Cardiology signed off     Hypernatremia: due to poor oral intake  -improving. switch to hypotonic fluid.  -Start diet     Dehydration: continue iv hydration  -improving      Alzheimer dementia.      Hypertension,would resume home BB when able  Thrombocytosis due to dehydration, improving with fluids.      PT/OT SNF    FULL CODE. Outpatient address with PMD        DISCHARGE DIAGNOSES / PLAN:      1. Left DVT s/p IVC filter. Not on anticoagulation  2. Left hip/left proximal fibular fracture medical management       PENDING TEST RESULTS:   At the time of discharge the following test results are still pending: none    FOLLOW UP APPOINTMENTS:    Follow-up Information     Follow up With Details Comments [address] Hortensia Melendez MD In 3 days  [address] [facility].   [phone]             ADDITIONAL CARE RECOMMENDATIONS:   Fall precautions  Please readdress the issue of anticoagulation/blood thinners with your PMD  Please address the issue of code status with your PMD    DIET: Cardiac Diet    ACTIVITY: Activity as tolerated and as recommended by PT/OT      DISCHARGE MEDICATIONS:   See Medication Reconciliation Form      NOTIFY YOUR PHYSICIAN FOR ANY OF THE FOLLOWING:   Fever over 101 degrees for 24 hours. Chest pain, shortness of breath, fever, chills, nausea, vomiting, diarrhea, change in mentation, falling, weakness, bleeding. Severe pain or pain not relieved by medications. Or, any other signs or symptoms that you may have questions about.     DISPOSITION:    Home With:   OT  PT  HH  RN      x SNF/Inpatient Rehab/LTAC    Independent/assisted living    Hospice    Other:       PATIENT CONDITION AT DISCHARGE:     Functional status   x Poor     Deconditioned     Independent      Cognition     Lucid     Forgetful    x Dementia      Catheters/lines (plus indication)    Don     PICC     PEG    x None      Code status   x  Full code     DNR      PHYSICAL EXAMINATION AT DISCHARGE:  Please see progress note    CHRONIC MEDICAL DIAGNOSES:  Problem List as of 4/21/2017  Date Reviewed: 4/18/2017          Codes Class Noted - Resolved    Hip fracture requiring operative repair [facility]) ICD-10-CM: K88.160F  ICD-9-CM: 820.8 4/18/2017 - Present        * (Principal)Closed intertrochanteric fracture of left femur ([facility] [address] ICD-10-CM: K75.789E  ICD-9-CM: 820.21  4/17/2017 - Present        Nausea vomiting and diarrhea ICD-10-CM: R11.2, R19.7  ICD-9-CM: 787.91, 787.01  1/19/2017 - Present        Dementia (Chronic) ICD-10-CM: F03.90  ICD-9-CM: 294.20  1/19/2017 - Present        Status post Osito's procedure (HCC) (Chronic) ICD-10-CM: Z93.3  ICD-9-CM: V44.3  6/14/2016 - Present        Hypertension (Chronic) ICD-10-CM: I10  ICD-9-CM: 401.9  6/14/2016 - Present              Greater than 37 minutes were spent with the patient on counseling and coordination of care    Signed:   Tim Alcantara MD  4/21/2017  9:17 AM

## 2017-04-21 NOTE — PROGRESS NOTES
Bedside and Verbal shift change report given to Mari Mensah (oncoming nurse) by Sue Angel (offgoing nurse). Report included the following information SBAR, Kardex, Intake/Output, MAR, Recent Results and Cardiac Rhythm Sinus Tach.

## 2017-04-21 NOTE — PROGRESS NOTES
Note patient is discharged today to [facility]. No ambulance preference, so referral sent to [facility] requesting 1 pm pick-up time. [facility] confirmed 1 pm pick-up time.

## 2017-04-21 NOTE — PROGRESS NOTES
TRANSFER - OUT REPORT:    Verbal report given to Joshua Yee (name) on [employer]  being transferred to [facility] (unit) for routine progression of care       Report consisted of patients Situation, Background, Assessment and   Recommendations(SBAR). Information from the following report(s) SBAR, ED Summary, Procedure Summary, Intake/Output, MAR, Recent Results and Cardiac Rhythm NSR/sinus tach was reviewed with the receiving nurse. Opportunity for questions and clarification was provided. Packet complied with prescription, discharge information. Pt's IVs removed and pt taken by ambulance to [facility].

## 2017-07-26 PROBLEM — S72.90XA FEMUR FRACTURE (HCC): Status: ACTIVE | Noted: 2017-01-01

## 2017-07-26 NOTE — PROGRESS NOTES
TRANSFER - IN REPORT:    Verbal report received from Lu Ahmadi RN(name) on [employer]  being received from ED(unit) for routine progression of care      Report consisted of patients Situation, Background, Assessment and   Recommendations(SBAR). Information from the following report(s) ED Summary, Procedure Summary, Intake/Output, MAR and Recent Results was reviewed with the receiving nurse. Opportunity for questions and clarification was provided. Assessment completed upon patients arrival to unit and care assumed.

## 2017-07-26 NOTE — ED NOTES
Patient's left femur fracture successfully reduced by ortho PA. Patient tolerated procedure well. Splinted with assistance by PCT. Patient in no distress, VSS.

## 2017-07-26 NOTE — ED NOTES
Bedside and Verbal shift change report given to Syl Bosch RN (oncoming nurse) by Kasi Araujo RN (offgoing nurse). Report included the following information SBAR, ED Summary, Intake/Output, MAR and Recent Results.

## 2017-07-26 NOTE — CONSULTS
ORTHOPAEDIC CONSULT NOTE    Subjective:     Date of Consultation:  2017      Farheen Burroughs is a 80 y.o. female who is being seen for left femur fracture. Injury occurred this afternoon then pt fell out of bed(unwitnessed). Patient lives at home with family and is primarily bed ridden. Non ambulatory since left hip fracture(treated non-op) 2017. Treated by Dr Kaya Baxter for left IT fxr 2017    Patient Active Problem List    Diagnosis Date Noted    Femur fracture ([address] 2017    Hip fracture requiring operative repair ([address] 2017    Closed intertrochanteric fracture of left femur ([address] 2017    Nausea vomiting and diarrhea 2017    Dementia 2017    Status post Osito's procedure ([facility] [address] 2016    Hypertension 2016     Family History   Problem Relation Age of Onset    Cancer Sister      pancreatic    Anesth Problems Neg Hx       Social History   Substance Use Topics    Smoking status: Never Smoker    Smokeless tobacco: Never Used    Alcohol use No     Past Medical History:   Diagnosis Date    Alzheimer's dementia     Diagnosed while in rehab recovering from colon resection.  Chronic pain     lower back    History of colonic polyps     Tubular adenoma excised colonoscopically on 2016.  Hypertension     Lipoma of colon     Resected on 2016. Past Surgical History:   Procedure Laterality Date    HX  SECTION      HX COLONOSCOPY  2016    Dr. Nick Rahman.  HX OTHER SURGICAL  2016    Reduction of intussusception, sigmoid colon resection, and creation of end colostomy and Osito's pouch to treat colonic obstruction secondary to colorectal intussusception of a sigmoid colon lipoma; Dr. Nick Rahman. Prior to Admission medications    Medication Sig Start Date End Date Taking? Authorizing Provider   donepezil (ARICEPT) 5 mg tablet Take 10 mg by mouth nightly.    Yes Historical Provider   acetaminophen (TYLENOL) 500 mg tablet Take 500 mg by mouth every eight (8) hours as needed for Pain. Yes Historical Provider   senna-docusate (PERICOLACE) 8.6-50 mg per tablet Take 1 Tab by mouth nightly. Yes Historical Provider   COD LIVER OIL/ZINC OXIDE (DESITIN EX) by Apply Externally route nightly. Patient's niece stated that she applies this to her lower back and buttocks for sores and itching. Yes Historical Provider   FOLIC ACID/MULTIVIT-MIN/LUTEIN (CENTRUM SILVER PO) Take 1 Tab by mouth nightly. Yes Historical Provider     Current Facility-Administered Medications   Medication Dose Route Frequency    sodium chloride (NS) flush 5-10 mL  5-10 mL IntraVENous Q8H    sodium chloride (NS) flush 5-10 mL  5-10 mL IntraVENous PRN    cefTRIAXone (ROCEPHIN) 1 g in 0.9% sodium chloride (MBP/ADV) 50 mL  1 g IntraVENous Q24H    dextrose 5 % - 0.45% NaCl infusion  100 mL/hr IntraVENous PRN    donepezil (ARICEPT) tablet 10 mg  10 mg Oral QHS    sodium chloride (NS) flush 5-10 mL  5-10 mL IntraVENous Q8H    sodium chloride (NS) flush 5-10 mL  5-10 mL IntraVENous PRN    acetaminophen (TYLENOL) tablet 650 mg  650 mg Oral Q6H    ondansetron (ZOFRAN) injection 4 mg  4 mg IntraVENous Q4H PRN    docusate sodium (COLACE) capsule 100 mg  100 mg Oral BID    potassium chloride 10 mEq in 100 ml IVPB  10 mEq IntraVENous Q1H    propofol (DIPRIVAN) 10 mg/mL injection 20.4 mg  0.5 mg/kg IntraVENous NOW     Current Outpatient Prescriptions   Medication Sig    donepezil (ARICEPT) 5 mg tablet Take 10 mg by mouth nightly.  acetaminophen (TYLENOL) 500 mg tablet Take 500 mg by mouth every eight (8) hours as needed for Pain.  senna-docusate (PERICOLACE) 8.6-50 mg per tablet Take 1 Tab by mouth nightly.  COD LIVER OIL/ZINC OXIDE (DESITIN EX) by Apply Externally route nightly. Patient's niece stated that she applies this to her lower back and buttocks for sores and itching.     FOLIC ACID/MULTIVIT-MIN/LUTEIN (CENTRUM SILVER PO) Take 1 Tab by mouth nightly. No Known Allergies     Review of Systems:  A comprehensive review of systems was negative except for that written in the HPI. Mental Status: severe dementia    Objective:     Patient Vitals for the past 8 hrs:   BP Temp Pulse Resp SpO2 Height Weight   17 1613 - - - - 99 % - -   17 1600 - - - - 98 % - -   17 1500 120/82 - - - 98 % - -   17 1445 131/84 - - - 98 % - -   17 1444 131/84 98.6 °F (37 °C) (!) 107 16 98 % 5' 4\" (1.626 m) 40.8 kg (90 lb)   17 1439 125/83 - - - 97 % - -     Temp (24hrs), Av.6 °F (37 °C), Min:98.6 °F (37 °C), Max:98.6 °F (37 °C)       Gen:  in no acute distress, thin. .. emaciated    HEENT: Pink conjunctivae, hearing intact to voice, moist mucous membranes   Neck: Supple,   Resp: No resp distress  Card: RRR, No peripheral edema   Musc:  Pt lying with left LE folded under her right leg. Sig swelling of the left thigh compared to the right  Skin: No skin breakdown noted. Skin warm, pink, dry   Neuro: follows some commands   Psych: advanced dementia, wears adult diaper    Imaging Review: XR FEMUR LT 2 V     INDICATION:   pain, deformity- mid shaft.     COMPARISON: 2017     FINDINGS: Two views of the left femur demonstrate chronic fracture of the left  intertrochanteric region with heterotopic bone formation. There is a new spiral  fracture midshaft of the femur with apex medial and posterior angulation.     IMPRESSION:    1. New fracture mid diaphysis left femur  2. Chronic fracture of the left hip.     Labs:   Recent Results (from the past 24 hour(s))   CBC WITH AUTOMATED DIFF    Collection Time: 17  3:12 PM   Result Value Ref Range    WBC 10.3 3.6 - 11.0 K/uL    RBC 4.63 3.80 - 5.20 M/uL    HGB 14.3 11.5 - 16.0 g/dL    HCT 43.9 35.0 - 47.0 %    MCV 94.8 80.0 - 99.0 FL    MCH 30.9 26.0 - 34.0 PG    MCHC 32.6 30.0 - 36.5 g/dL    RDW 16.5 (H) 11.5 - 14.5 %    PLATELET 214 468 - 084 K/uL    NEUTROPHILS 73 32 - 75 % LYMPHOCYTES 20 12 - 49 %    MONOCYTES 6 5 - 13 %    EOSINOPHILS 1 0 - 7 %    BASOPHILS 0 0 - 1 %    ABS. NEUTROPHILS 7.6 1.8 - 8.0 K/UL    ABS. LYMPHOCYTES 2.0 0.8 - 3.5 K/UL    ABS. MONOCYTES 0.6 0.0 - 1.0 K/UL    ABS. EOSINOPHILS 0.1 0.0 - 0.4 K/UL    ABS. BASOPHILS 0.0 0.0 - 0.1 K/UL   METABOLIC PANEL, COMPREHENSIVE    Collection Time: 07/26/17  3:12 PM   Result Value Ref Range    Sodium 148 (H) 136 - 145 mmol/L    Potassium 2.9 (L) 3.5 - 5.1 mmol/L    Chloride 112 (H) 97 - 108 mmol/L    CO2 32 21 - 32 mmol/L    Anion gap 4 (L) 5 - 15 mmol/L    Glucose 117 (H) 65 - 100 mg/dL    BUN 36 (H) 6 - 20 MG/DL    Creatinine 0.58 0.55 - 1.02 MG/DL    BUN/Creatinine ratio 62 (H) 12 - 20      GFR est AA >60 >60 ml/min/1.73m2    GFR est non-AA >60 >60 ml/min/1.73m2    Calcium 8.8 8.5 - 10.1 MG/DL    Bilirubin, total 0.6 0.2 - 1.0 MG/DL    ALT (SGPT) 30 12 - 78 U/L    AST (SGOT) 26 15 - 37 U/L    Alk.  phosphatase 111 45 - 117 U/L    Protein, total 7.1 6.4 - 8.2 g/dL    Albumin 3.2 (L) 3.5 - 5.0 g/dL    Globulin 3.9 2.0 - 4.0 g/dL    A-G Ratio 0.8 (L) 1.1 - 2.2     URINALYSIS W/MICROSCOPIC    Collection Time: 07/26/17  4:22 PM   Result Value Ref Range    Color YELLOW/STRAW      Appearance CLOUDY (A) CLEAR      Specific gravity 1.026 1.003 - 1.030      pH (UA) 5.0 5.0 - 8.0      Protein 100 (A) NEG mg/dL    Glucose NEGATIVE  NEG mg/dL    Ketone TRACE (A) NEG mg/dL    Bilirubin NEGATIVE  NEG      Blood LARGE (A) NEG      Urobilinogen 0.2 0.2 - 1.0 EU/dL    Nitrites POSITIVE (A) NEG      Leukocyte Esterase MODERATE (A) NEG      WBC 20-50 0 - 4 /hpf    RBC 0-5 0 - 5 /hpf    Epithelial cells FEW FEW /lpf    Bacteria 4+ (A) NEG /hpf    Amorphous Crystals 1+ (A) NEG   PROTHROMBIN TIME + INR    Collection Time: 07/26/17  4:22 PM   Result Value Ref Range    INR 1.0 0.9 - 1.1      Prothrombin time 10.2 9.0 - 11.1 sec         Impression:     Patient Active Problem List    Diagnosis Date Noted    Femur fracture ([facility] [address] 07/26/2017    Hip fracture requiring operative repair ([facility] 04/18/2017    Closed intertrochanteric fracture of left femur (HCC) 04/17/2017    Nausea vomiting and diarrhea 01/19/2017    Dementia 01/19/2017    Status post Osito's procedure ([facility] [address] 06/14/2016    Hypertension 06/14/2016     Principal Problem:    Femur fracture ([facility] [address] (7/26/2017)        Plan:   -  Consent for procedural sedation(provided by Dr Essence Schultz) in the ER and well padded long leg splint applied- neurovascular exam intact post splinting. POA- Nephew(Louis)- signed consents  -  Post splint x-rays ordered. -  NPO after midnight   -  Plan for ORIF- retrograde IM nail tomorrow afternoon if medically stable. -  Will address left hip and inability to wt bear if necessary after the femur heals.   -  D/C planning SNF    Pt was seen by Dr Amanuel Solis and outlined treatment plan      INDY Howard

## 2017-07-26 NOTE — ED PROVIDER NOTES
HPI Comments: Jacqueline Carney, 80 y.o. Female with PMHx significant for Alzheimer's disease, presents via EMS to ED [facility] with cc of left leg pain and low back pain secondary to an unwitnessed fall out of her bed PTA. The patient's nephew states that he heard a \"thump\" and found the patient lying on the floor. Per EMS, the patient received intranasal fentanyl en route with relief in pain. Per nephew, the patient had a left femur fracture on 17, but it was not surgically corrected. Per nephew, the patient is currently at her mental baseline. Per nephew, the patient denies a history of heart disease. PCP: Shi Monsalve MD  Orthopedic Surgery:   [facility] history significant for: - Tobacco, - EtOH, - Illicit Drug Use    There are no other complaints, changes, or physical findings at this time. The patient speaks [place], and her nephew translates for her. Written by SHELLEY Dave, as dictated by Yuni Ansari DO. The history is provided by the patient and a relative. A  was used (Nephew). Past Medical History:   Diagnosis Date    Alzheimer's dementia     Diagnosed while in rehab recovering from colon resection.  Chronic pain     lower back    History of colonic polyps     Tubular adenoma excised colonoscopically on 2016.  Hypertension     Lipoma of colon     Resected on 2016. Past Surgical History:   Procedure Laterality Date    HX  SECTION      HX COLONOSCOPY  2016    Dr. Bj Lovelace.  HX OTHER SURGICAL  2016    Reduction of intussusception, sigmoid colon resection, and creation of end colostomy and Osito's pouch to treat colonic obstruction secondary to colorectal intussusception of a sigmoid colon lipoma; Dr. Bj Lovelace.          Family History:   Problem Relation Age of Onset    Cancer Sister      pancreatic    Anesth Problems Neg Hx        Social History     Social History    Marital status:      Spouse name: N/A    Number of children: N/A    Years of education: N/A     Occupational History    Not on file. Social History Main Topics    Smoking status: Never Smoker    Smokeless tobacco: Never Used    Alcohol use No    Drug use: No    Sexual activity: Not on file     Other Topics Concern    Not on file     Social History Narrative         ALLERGIES: Review of patient's allergies indicates no known allergies. Review of Systems   Constitutional: Negative. Negative for appetite change, chills, fatigue and fever. HENT: Negative. Negative for congestion, rhinorrhea, sinus pressure and sore throat. Eyes: Negative. Respiratory: Negative. Negative for cough, choking, chest tightness, shortness of breath and wheezing. Cardiovascular: Negative. Negative for chest pain, palpitations and leg swelling. Gastrointestinal: Negative for abdominal pain, constipation, diarrhea, nausea and vomiting. Endocrine: Negative. Genitourinary: Negative. Negative for difficulty urinating, dysuria, flank pain and urgency. Musculoskeletal: Positive for arthralgias (Left leg) and back pain. Skin: Negative. Neurological: Negative. Negative for dizziness, speech difficulty, weakness, light-headedness, numbness and headaches. Psychiatric/Behavioral: Negative. All other systems reviewed and are negative. Vitals:    07/26/17 1445 07/26/17 1500 07/26/17 1600 07/26/17 1613   BP: 131/84 120/82     Pulse:       Resp:       Temp:       SpO2: 98% 98% 98% 99%   Weight:       Height:                Physical Exam   Constitutional: She is oriented to person, place, and time. No distress. Cachectic, weak appearing     HENT:   Head: Normocephalic and atraumatic. Mouth/Throat: Oropharynx is clear and moist. No oropharyngeal exudate. Eyes: Conjunctivae and EOM are normal. Pupils are equal, round, and reactive to light. Neck: Normal range of motion. Neck supple. No JVD present. No tracheal deviation present. Cardiovascular: Normal rate, regular rhythm, normal heart sounds and intact distal pulses. No murmur heard. Pulmonary/Chest: Effort normal and breath sounds normal. No stridor. No respiratory distress. She has no wheezes. She has no rales. She exhibits no tenderness. Abdominal: Soft. She exhibits no distension. There is no tenderness. There is no rebound and no guarding. Musculoskeletal: She exhibits no edema. Left leg held in hip flexion and knee flexion, there is a deformity to the left mid femur, with leg shortening, and swelling to the thigh, distal PMS intact, + tenderness over the left hip, mild tenderness to lower back, no midline ttp or step-offs, No C/L spine tenderness, no ttp of the right leg as well as bilateral upper extremities     Neurological: She is alert and oriented to person, place, and time. No cranial nerve deficit. No gross motor or sensory deficits    Skin: Skin is warm and dry. She is not diaphoretic. Psychiatric:   Flat affect   Nursing note and vitals reviewed. MDM  Number of Diagnoses or Management Options  Closed displaced oblique fracture of shaft of left femur, initial encounter:   Diagnosis management comments:   DDx: Femur fracture, hip fracture, UTI, dehydration       Amount and/or Complexity of Data Reviewed  Clinical lab tests: ordered and reviewed  Tests in the radiology section of CPT®: ordered and reviewed  Review and summarize past medical records: yes  Discuss the patient with other providers: yes (Orthopedic Surgery, Hospitalist)    Patient Progress  Patient progress: stable       ED Course       Procedures    Procedure Note - Backboard removal:    2:41 PM  Performed by: Laine Carcamo DO  Pt was taken off backboard. Written by SHELLEY Rolle, as dictated by Laine Carcamo DO.     Consult Note:  4:01 PM  Laine Carcamo DO spoke with Alvenia Cogan, PA-C,  Specialty: Orthopedic Surgery  Discussed pt's hx, disposition, and available diagnostic and imaging results. Reviewed care plans. Consultant agrees with plans as outlined. Anali Arceo PA-C states that he will evaluate the patient at bedside. Progress Note:  4:50 PM  The patient's imaging results were discussed at length with the patient and her family (who are at bedside). The patient's family convey their understanding of the results. Awaiting Orthopedic Surgery evaluation. Written by SHELLEY Vaughn, as dictated by Rhiannon Dominguez DO. Progress Note:  5:31 PM  Anali Arceo PA-C called back and reports that Dr. Diana Calhoun has reviewed the patient's images and recommends hospitalist admission. Written by SHELLEY Vaughn, as dictated by Rhiannon Dominguez DO.    CONSULT NOTE:   5:55 PM  Rhiannon Dominguez DO spoke with Dr. Beronica Shaw,   Specialty: Hospitalist  Discussed pt's hx, disposition, and available diagnostic and imaging results. Reviewed care plans. Consultant will evaluate pt for admission. Written by SHELLEY Vaughn, as dictated by Rhiannon Dominguez DO. Procedure Note - Procedural Sedation:   6:45 PM  Procedure performed by: Ye Bansal DO  Assistants: La Espinosa, ER Resident  Procedural sedation has been advised for this patient associated with left femur fracture. PLAN FOR SEDATION:  The patients sedation plan includes a total of 20 mg propofol/DIPRIVAN. Resuscitation equipment will be at the bedside should they be needed. The risks, benefits, and alternatives have been explained to the patient and She consents to the sedation. The ASA score is ASA 3 - Severe systemic disease but compensated . The patient is having all vital signs monitored by an attending RN as well as pulse oximetry. Mallampati Score Reference: The patient has a patent airway with a Mallampati Classification Score of II (soft palate, uvula, fauces visible).     IMMEDIATE REASSESSMENT PRIOR TO PROCEDURE:  TIME: 6:44 PM   Immediately prior to the procedure, the patient was re-evaluated and found suitable for the planned procedure and any planned medications. ____________________________________________________________________________________________________________________________________________    Post Procedure Anaesthesia/Sedation Assessment #1  TIME: 6:55 PM  Post Procedure assessment performed by: Ye Bansal DO  Assistants: La Espinosa, ER Resident  The patient was sedated with a total of 20 mg propofol/DIPRIVAN. Reversal agents and resuscitation equipment were at the bedside. The left femur splint was done and the patient tolerated the procedure well with no complications. Reversal agents were not used. HR:91      Rhythm: regular  RR: 16                 SpO2: 97 %  Airway patent?: yes  BP: 116/86  Hydration Status: adequate  Mental Status: at baseline    Intraprocedure/postprocedure Complications: None  EBL: None  The procedure took 1-15 minutes, and pt tolerated well. During this post sedation period, in addition to the recovering RN, there has been at least one other staff member in the unit. This additional staff member was able to directly hear a call for assistance in the case of an emergency. Written by Jessica Starks ED Scribe, as dictated by La Espinosa, ER Resident     LABORATORY TESTS:  Recent Results (from the past 12 hour(s))   CBC WITH AUTOMATED DIFF    Collection Time: 07/26/17  3:12 PM   Result Value Ref Range    WBC 10.3 3.6 - 11.0 K/uL    RBC 4.63 3.80 - 5.20 M/uL    HGB 14.3 11.5 - 16.0 g/dL    HCT 43.9 35.0 - 47.0 %    MCV 94.8 80.0 - 99.0 FL    MCH 30.9 26.0 - 34.0 PG    MCHC 32.6 30.0 - 36.5 g/dL    RDW 16.5 (H) 11.5 - 14.5 %    PLATELET 514 573 - 444 K/uL    NEUTROPHILS 73 32 - 75 %    LYMPHOCYTES 20 12 - 49 %    MONOCYTES 6 5 - 13 %    EOSINOPHILS 1 0 - 7 %    BASOPHILS 0 0 - 1 %    ABS. NEUTROPHILS 7.6 1.8 - 8.0 K/UL    ABS.  LYMPHOCYTES 2.0 0.8 - 3.5 K/UL    ABS. MONOCYTES 0.6 0.0 - 1.0 K/UL    ABS. EOSINOPHILS 0.1 0.0 - 0.4 K/UL    ABS. BASOPHILS 0.0 0.0 - 0.1 K/UL   METABOLIC PANEL, COMPREHENSIVE    Collection Time: 07/26/17  3:12 PM   Result Value Ref Range    Sodium 148 (H) 136 - 145 mmol/L    Potassium 2.9 (L) 3.5 - 5.1 mmol/L    Chloride 112 (H) 97 - 108 mmol/L    CO2 32 21 - 32 mmol/L    Anion gap 4 (L) 5 - 15 mmol/L    Glucose 117 (H) 65 - 100 mg/dL    BUN 36 (H) 6 - 20 MG/DL    Creatinine 0.58 0.55 - 1.02 MG/DL    BUN/Creatinine ratio 62 (H) 12 - 20      GFR est AA >60 >60 ml/min/1.73m2    GFR est non-AA >60 >60 ml/min/1.73m2    Calcium 8.8 8.5 - 10.1 MG/DL    Bilirubin, total 0.6 0.2 - 1.0 MG/DL    ALT (SGPT) 30 12 - 78 U/L    AST (SGOT) 26 15 - 37 U/L    Alk. phosphatase 111 45 - 117 U/L    Protein, total 7.1 6.4 - 8.2 g/dL    Albumin 3.2 (L) 3.5 - 5.0 g/dL    Globulin 3.9 2.0 - 4.0 g/dL    A-G Ratio 0.8 (L) 1.1 - 2.2     URINALYSIS W/MICROSCOPIC    Collection Time: 07/26/17  4:22 PM   Result Value Ref Range    Color YELLOW/STRAW      Appearance CLOUDY (A) CLEAR      Specific gravity 1.026 1.003 - 1.030      pH (UA) 5.0 5.0 - 8.0      Protein 100 (A) NEG mg/dL    Glucose NEGATIVE  NEG mg/dL    Ketone TRACE (A) NEG mg/dL    Bilirubin NEGATIVE  NEG      Blood LARGE (A) NEG      Urobilinogen 0.2 0.2 - 1.0 EU/dL    Nitrites POSITIVE (A) NEG      Leukocyte Esterase MODERATE (A) NEG      WBC 20-50 0 - 4 /hpf    RBC 0-5 0 - 5 /hpf    Epithelial cells FEW FEW /lpf    Bacteria 4+ (A) NEG /hpf    Amorphous Crystals 1+ (A) NEG   PROTHROMBIN TIME + INR    Collection Time: 07/26/17  4:22 PM   Result Value Ref Range    INR 1.0 0.9 - 1.1      Prothrombin time 10.2 9.0 - 11.1 sec       IMAGING RESULTS:  CXR Results  (Last 48 hours)               07/26/17 1552  XR CHEST SNGL V Final result    Narrative:  INDICATION:  Chest Pain        Exam: Chest single view . Comparison: June 9, 2016. Findings: Cardiomediastinal silhouette is within normal limits. Pulmonary   vasculature is not engorged. There are no focal parenchymal opacities,   effusions, or pneumothorax. Impression:   1. No acute disease               EXAM:  XR FEMUR LT 2 V     INDICATION:   pain, deformity- mid shaft.     COMPARISON: April 18, 2017     FINDINGS: Two views of the left femur demonstrate chronic fracture of the left  intertrochanteric region with heterotopic bone formation. There is a new spiral  fracture midshaft of the femur with apex medial and posterior angulation.     IMPRESSION:    1. New fracture mid diaphysis left femur  2. Chronic fracture of the left hip.                      INDICATION:  Back Pain      EXAM: 2 views lumbar spine. No comparisons.     FINDINGS: Slight dextroscoliosis of the lumbar spine. Bones are osteopenic. Mild  multilevel degenerative change. No bony destructive lesions or fractures.     IMPRESSION:  1. No acute abnormality            EXAM:  XR HIP LT W OR WO PELV 2-3 VWS     INDICATION:   Trauma. Left mid shaft femur deformity     COMPARISON: April 17, 2017.     FINDINGS: An AP view of the pelvis and a frogleg lateral view of the left hip  demonstrate chronic fracture of the left intertrochanteric region with extensive  heterotopic new bone formation. Fracture of the midshaft left femur incompletely  visualized.     IMPRESSION:    1. Incompletely visualized acute fracture of the left mid shaft femur  2.  Healing left hip fracture.                    MEDICATIONS GIVEN:  Medications   sodium chloride (NS) flush 5-10 mL (not administered)   sodium chloride (NS) flush 5-10 mL (not administered)   cefTRIAXone (ROCEPHIN) 1 g in 0.9% sodium chloride (MBP/ADV) 50 mL (not administered)   dextrose 5 % - 0.45% NaCl infusion (not administered)   donepezil (ARICEPT) tablet 10 mg (not administered)   sodium chloride (NS) flush 5-10 mL (not administered)   sodium chloride (NS) flush 5-10 mL (not administered)   acetaminophen (TYLENOL) tablet 650 mg (not administered) ondansetron (ZOFRAN) injection 4 mg (not administered)   docusate sodium (COLACE) capsule 100 mg (not administered)   potassium chloride 10 mEq in 100 ml IVPB (not administered)   propofol (DIPRIVAN) 10 mg/mL injection 20.4 mg (not administered)   fentaNYL citrate (PF) injection 25 mcg (25 mcg IntraVENous Given 7/26/17 1507)   cefTRIAXone (ROCEPHIN) 1 g in 0.9% sodium chloride (MBP/ADV) 50 mL (1 g IntraVENous New Bag 7/26/17 1720)       IMPRESSION:  1. Closed displaced oblique fracture of shaft of left femur, initial encounter    2. Urinary tract infection without hematuria, site unspecified        PLAN:  1. Admit to Hospitalist.    Admit Note:  5:55 PM  Pt is being admitted by Dr. Nahomi Gandhi. The results of their tests and reason(s) for their admission have been discussed with pt and/or available family. They convey agreement and understanding for the need to be admitted and for admission diagnosis. This note is prepared by Aziza Rick, acting as a Scribe for Eben Garcia, [address] [place] [address]: The scribe's documentation has been prepared under my direction and personally reviewed by me in its entirety. I confirm that the notes above accurately reflects all work, treatment, procedures, and medical decision making performed by me.

## 2017-07-26 NOTE — ROUTINE PROCESS
TRANSFER - OUT REPORT:    Verbal report given to Soha Barragan RN (name) on [facility]  being transferred to [facility] (unit) for routine progression of care       Report consisted of patients Situation, Background, Assessment and   Recommendations(SBAR). Information from the following report(s) ED Summary and MAR was reviewed with the receiving nurse. Lines:   Peripheral IV 07/26/17 Left Antecubital (Active)   Site Assessment Clean, dry, & intact 7/26/2017  2:48 PM   Phlebitis Assessment 0 7/26/2017  2:48 PM   Infiltration Assessment 0 7/26/2017  2:48 PM   Dressing Status Clean, dry, & intact 7/26/2017  2:48 PM        Opportunity for questions and clarification was provided.       Patient transported with:   [employer]

## 2017-07-26 NOTE — ED TRIAGE NOTES
Patient arrives via EMS for GLF out of bed and left upper leg injury with deformity. Patient's nephew heard a \"thump\" and found patient on the floor. She has baseline dementia and is acting consistently with her baseline. EMS gave two doses of nasal Fentanyl for a total administration of 100mcg.

## 2017-07-26 NOTE — PROGRESS NOTES
Pharmacy Clarification of Prior to Admission Medication Regimen     The patient was not interviewed regarding clarification of the prior to admission medication regimen due to severe AMS at baseline. Patient's nephew and niece, Hill Hesnon and Charles Pineda were present in room and stated the patient resides with them and they are her primary caregivers. Patient's niece and nephew brought in the patient's prescription bottles and were able to clarify PTA medication history    Information Obtained From: Patient's niece and nephew, prescription bottles, Rx query    Pertinent Pharmacy Findings:   Hydrocodone/APAP 5-325 mg: Patient's niece stated that the patient completed this therapy \"about a week ago. \"    Per patient's niece, the patient has difficulty swallowing and will \"spit her pills back out. \" The patient's niece has been breaking the medications or crushing the medications and mixing them with \"applesauce or pudding. \"    PTA medication list was corrected to the following:     Prior to Admission Medications   Prescriptions Last Dose Informant Patient Reported? Taking? COD LIVER OIL/ZINC OXIDE (DESITIN EX) 7/25/2017 at Unknown time Family Member Yes Yes   Sig: by Apply Externally route nightly. Patient's niece stated that she applies this to her lower back and buttocks for sores and itching. FOLIC ACID/MULTIVIT-MIN/LUTEIN (CENTRUM SILVER PO) 7/25/2017 at Unknown time Family Member Yes Yes   Sig: Take 1 Tab by mouth nightly. acetaminophen (TYLENOL) 500 mg tablet 7/26/2017 at Unknown time Family Member Yes Yes   Sig: Take 500 mg by mouth every eight (8) hours as needed for Pain. donepezil (ARICEPT) 5 mg tablet 7/25/2017 at Unknown time Family Member Yes Yes   Sig: Take 10 mg by mouth nightly. senna-docusate (PERICOLACE) 8.6-50 mg per tablet 7/25/2017 at Unknown time Family Member Yes Yes   Sig: Take 1 Tab by mouth nightly.       Facility-Administered Medications: None          Thank you,  Jacob Check Santino Carrington  Medication History Pharmacy Technician

## 2017-07-26 NOTE — IP AVS SNAPSHOT
[address] [facility] 
[phone] Patient: Dakotah Neves MRN: [id number] [facility]:[birth year] You are allergic to the following No active allergies Recent Documentation Height Weight BMI OB Status Smoking Status 1.626 m 40.8 kg 15.45 kg/m2 Menopause Never Smoker Emergency Contacts Name Discharge Info Relation Home Work Mobile Crain Ellis DISCHARGE CAREGIVER [3] Other Relative [6] [phone] Vicki Reddy  Other Relative [6] [phone] About your hospitalization You were admitted on:  July 26, 2017 You last received care in the:  [facility] ORTHOPEDICS You were discharged on:  August 1, 2017 Unit phone number:  [phone] Why you were hospitalized Your primary diagnosis was:  Femur Fracture (Hcc) Your diagnoses also included:  Dementia, Hypertension, Uti (Urinary Tract Infection) Providers Seen During Your Hospitalizations Provider Role Specialty Primary office phone Kristy Cage DO Attending Provider Emergency Medicine [phone] Leyla Bautista MD Attending Provider Internal Medicine [phone] William Briseno MD Attending Provider Hospitalist [phone] Your Primary Care Physician (PCP) Primary Care Physician Office Phone Office Fax Pablo Schaeffer [phone] Follow-up Information Follow up With Details Comments Contact Info Lenny Hampton MD   [address] [facility] [address] 
[phone] Malvin Scales DO In 2 weeks  [facility] [address] [place] [address] 
[phone] Your Appointments Tuesday August 08, 2017  3:15 PM EDT New Patient with Tiki Alas NP [facility] ([address]) [address] [facility] [address] [place] [address] [phone] Current Discharge Medication List  
  
START taking these medications Dose & Instructions Dispensing Information Comments Morning Noon Evening Bedtime  
 calcium-vitamin D 500 mg(1,250mg) -200 unit per tablet Commonly known as:  OYSTER SHELL Your last dose was: Your next dose is:    
   
   
 Dose:  1 Tab Take 1 Tab by mouth three (3) times daily (with meals). Quantity:  90 Tab Refills:  0  
     
   
   
   
  
 cefUROXime 500 mg tablet Commonly known as:  CEFTIN Your last dose was: Your next dose is:    
   
   
 Dose:  500 mg Take 1 Tab by mouth two (2) times a day for 5 days. Quantity:  10 Tab Refills:  0  
     
   
   
   
  
 docusate sodium 100 mg capsule Commonly known as:  Beny Weller Your last dose was: Your next dose is:    
   
   
 Dose:  100 mg Take 1 Cap by mouth two (2) times a day for 90 days. Quantity:  60 Cap Refills:  0  
     
   
   
   
  
 enoxaparin 60 mg/0.6 mL injection Commonly known as:  LOVENOX Your last dose was: Your next dose is:    
   
   
 Dose:  20 mg  
20 mg by SubCUTAneous route daily for 21 days. Quantity:  4.2 mL Refills:  0  
     
   
   
   
  
 famotidine 20 mg tablet Commonly known as:  PEPCID Your last dose was: Your next dose is:    
   
   
 Dose:  20 mg Take 1 Tab by mouth two (2) times a day. Quantity:  60 Tab Refills:  1  
     
   
   
   
  
 oxyCODONE IR 5 mg immediate release tablet Commonly known as:  Onita Estimable Your last dose was: Your next dose is:    
   
   
 Dose:  2.5 mg Take 0.5 Tabs by mouth every four (4) hours as needed. Max Daily Amount: 15 mg. Quantity:  30 Tab Refills:  0  
     
   
   
   
  
 polyethylene glycol 17 gram packet Commonly known as:  Susan Plane Your last dose was: Your next dose is:    
   
   
 Dose:  17 g Take 1 Packet by mouth daily. Quantity:  30 Each Refills:  1 CONTINUE these medications which have CHANGED Dose & Instructions Dispensing Information Comments Morning Noon Evening Bedtime  
 acetaminophen 325 mg tablet Commonly known as:  TYLENOL What changed:   
- medication strength 
- how much to take - when to take this Your last dose was: Your next dose is:    
   
   
 Dose:  650 mg Take 2 Tabs by mouth every six (6) hours as needed for Pain. Quantity:  40 Tab Refills:  0  
     
   
   
   
  
 donepezil 5 mg tablet Commonly known as:  ARICEPT What changed:  Another medication with the same name was removed. Continue taking this medication, and follow the directions you see here. Your last dose was: Your next dose is:    
   
   
 Dose:  10 mg Take 10 mg by mouth nightly. Refills:  0 CONTINUE these medications which have NOT CHANGED Dose & Instructions Dispensing Information Comments Morning Noon Evening Bedtime CENTRUM SILVER PO Your last dose was: Your next dose is:    
   
   
 Dose:  1 Tab Take 1 Tab by mouth nightly. Refills:  0 Chryl Lento Your last dose was: Your next dose is:    
   
   
 by Apply Externally route nightly. Patient's niece stated that she applies this to her lower back and buttocks for sores and itching. Refills:  0 STOP taking these medications   
 senna-docusate 8.6-50 mg per tablet Commonly known as:  Cote Bing Where to Get Your Medications Information on where to get these meds will be given to you by the nurse or doctor. ! Ask your nurse or doctor about these medications  
  acetaminophen 325 mg tablet  
 calcium-vitamin D 500 mg(1,250mg) -200 unit per tablet  
 cefUROXime 500 mg tablet  
 docusate sodium 100 mg capsule  
 enoxaparin 60 mg/0.6 mL injection  
 famotidine 20 mg tablet oxyCODONE IR 5 mg immediate release tablet  
 polyethylene glycol 17 gram packet Discharge Instructions HOSPITALIST DISCHARGE INSTRUCTIONS 
 
NAME: Porfirio Washington :  1935 MRN:  [id number] Date/Time:  2017 8:04 AM 
 
ADMIT DATE: 2017 DISCHARGE DATE: 2017 Attending Physician: Racheal Ferrari MD 
 
DISCHARGE DIAGNOSIS: 
Left Femoral Fracture, Healing Left hip Fracture, UTI, Dementia, Left DVT, Hypernatremia, Severe Malnutrition Medications: Per above medication reconciliation. Pain Management: per above medications Recommended diet: Dysphagia, mechanical altered Recommended activity: Activity as tolerated Wound care: See surgical/procedure care instructions Indwelling devices:  None Supplemental Oxygen: None Required Lab work: Per SNF routine Glucose management:  None Code status: DNR Outside physician follow up: Follow-up Information Follow up With Details Comments Contact Info Ronald Ray MD   [address] 
[phone] F/U PCP 
F/U Orthopedics Follow up appointments Call [place] [address] at [phone] to schedule follow up appt with Dr. Whit Franco in  10 - 14 days. ORTHOPAEDIC DISCHARGE INSTRUCTIONS When to call your Orthopaedic Surgeon: 
-unrelieved pain 
-Signs of infection-if your incision is red; continues to have drainage; drainage has a foul odor or if you have a persistent fever over 101 degrees 
-Signs of a blood clot in your leg-calf pain, tenderness, redness, swelling of lower leg When to call your Primary Care Physician: 
-Concerns about medical conditions such as diabetes, high blood pressure, asthma, congestive heart failure 
-Call if blood sugars are elevated, persistent headache or dizziness, coughing or congestion, constipation or diarrhea, burning with urination, abnormal heart rate When to call 911and go to the nearest emergency room 
-acute onset of chest pain, shortness of breath, difficulty breathing Activity - may weight bear as tolerated on Right leg, use hip precautions Incision Care - keep dressing clean and dry, change daily, may remove and shower in 3 days Preventing blood clots - Lovenox 20 mg daily for 21 days Pain management 
-take pain medication as prescribed; decrease the amount you use as your pain lessens 
-avoid alcoholic beverages while taking pain medication 
-Please be aware that many medications contain Tylenol. We do not want you to over medicate so please read the information below as a guide. Do not take more than 4 Grams of Tylenol in a 24 hour period. (There are 1000 milligrams in one Gram) Percocet contains 325 mg of Tylenol per tablet (do not take more than 12 tablets in 24 hours) Lortab contains 500 mg of Tylenol per tablet (do not take more than 8 tablets in 24 hours) Norco contains 325 mg of Tylenol per tablet (do not take more than 12 tablets in 24 hours). -You may place an ice bag on your affected extremity Diet 
-resume usual diet; drink plenty of fluids; eat foods high in fiber 
-you may want to take a stool softener (such as Senokot-S or Colace) to prevent constipation while you are taking pain medication. If constipation occurs, take a laxative (such as Dulcolax tablets, Milk of Magnesia, or a suppository) Physical Therapy-per physicians order Weight bearing status: Non weight bearing on LLE Physical Therapy 
-assessment and evaluation-bed mobility; functional transfers (bed, chair, bathroom, stairs); ambulation with equipment, safety and ability to get out of house in the event of an emergency 
-review and maintain weight bearing status 
-discuss pain management 
-review how to do ADLs Skilled nursing facility/ SNF MD responsible for above on discharge.   
 
 
 
Information obtained by : 
 I understand that if any problems occur once I am at home I am to contact my physician. I understand and acknowledge receipt of the instructions indicated above. Physician's or R.N.'s Signature                                                                  Date/Time Patient or Repres Discharge Orders None [employer] Announcement We are excited to announce that we are making your provider's discharge notes available to you in [employer]. You will see these notes when they are completed and signed by the physician that discharged you from your recent hospital stay. If you have any questions or concerns about any information you see in [employer], please call the Health Information Department where you were seen or reach out to your Primary Care Provider for more information about your plan of care. Introducing [facility]! [address] introduces [employer] patient portal. Now you can access parts of your medical record, email your doctor's office, and request medication refills online. 1. In your internet browser, go to [email or web address] 2. Click on the First Time User? Click Here link in the Sign In box. You will see the New Member Sign Up page. 3. Enter your [employer] Access Code exactly as it appears below. You will not need to use this code after youve completed the sign-up process. If you do not sign up before the expiration date, you must request a new code. · [employer] Access Code: [email or web address] Expires: 10/19/2017 12:19 PM 
 
4. Enter the last four digits of your Social Security Number (xxxx) and Date of Birth (mm/dd/yyyy) as indicated and click Submit.  You will be taken to the next sign-up page. 5. Create a [employer] ID. This will be your [employer] login ID and cannot be changed, so think of one that is secure and easy to remember. 6. Create a [employer] password. You can change your password at any time. 7. Enter your Password Reset Question and Answer. This can be used at a later time if you forget your password. 8. Enter your e-mail address. You will receive e-mail notification when new information is available in [address] 9. Click Sign Up. You can now view and download portions of your medical record. 10. Click the Download Summary menu link to download a portable copy of your medical information. If you have questions, please visit the Frequently Asked Questions section of the [employer] website. Remember, [employer] is NOT to be used for urgent needs. For medical emergencies, dial 911. Now available from your iPhone and Android! General Information Please provide this summary of care documentation to your next provider. Patient Signature:  ____________________________________________________________ Date:  ____________________________________________________________  
  
Karla Román Provider Signature:  ____________________________________________________________ Date:  ____________________________________________________________

## 2017-07-26 NOTE — ED NOTES
Airway cart, ambu-bag, suction prepared at bedside for conscious sedation. Patient placed on Cardiac and end tidal CO2 monitor.

## 2017-07-26 NOTE — H&P
Hospitalist Admission Note    NAME: Princess Bates   :  1935   MRN:  [id number]     Date/Time:  2017 5:55 PM    Patient PCP: Torin Tamayo MD  ________________________________________________________________________    My assessment of this patient's clinical condition and my plan of care is as follows. Assessment / Plan:  Acute fracture of the left mid shaft femur  Healing left hip fracture  Monitor for anemia/ delirium/ constipation   Left shaft fracture will be fixed in OR tomorrow  Ortho will d/w family Left hip fracture repair further   Pre-op cardiac risk assessment:  Pt evaluated using revised cardiac risk index and is felt to be low cardiovascular risk for intermediate risk surgery with a 0.4 risk for major complications based on these criteria. This risk has been discussed with the patient and family. Family is wishing to proceed. Plan for surgery without further cardiac testing if this risk is acceptable per surgery and anesthesia. Further risk reduction will involve medical management of other comorbid conditions in the perioperative period. Hypernatremia likely due to dehydration due to poor baseline po intake   IVF: d5w1/2ns aggressively  Monitor closely     Hypokalemia  Likely due to poor po intake  Supplement aggressively  Follow Mg     UTI  Empiric CTX pending UC ( adding on )     Alzheimer dementia  Confusion may deteriorate due to hospitalization. At high risk for falls and aspiration. Aspiration precautions .    Avoid exseccive sedation   Continue home Aricept   Will consult palliative care team to help with care decisions     Left LE DVT 2017  S/p IVC  Not on anticoagulation due to high risk of falls     Severe protein calory malnutrition  albumin 1.9   Nutritional consult           Code Status: DNR d/w son at bedside   Surrogate Decision Maker: son     DVT Prophylaxis: will be per ortho per protocol   GI Prophylaxis: not indicated    Baseline:lives with family; bed bound since April s/p fall; dementia; lives with son at home         Subjective:   CHIEF COMPLAINT: fall     HISTORY OF PRESENT ILLNESS:     Rian Petit is a 80 y.o.  female who presents with above complaint. Pt was brought in by family after she sustained a fall at home. Pt has Alzheimer's dementia and cannot contribute to the history. History was obtained from family at bedside. Patient's nephew heard a \"thump\" and went to check on pt. Pt was found lying on the floor. She rolled out of her bed. No LOC was reported. No hitting head. EMS gave pt intranasal fentanyl en route with relief in pain. Pt is c/o left leg pain after her fall. Pt was admitted to [facility] in April with left hp fracture. She was seen by ortho at that time. Her fracture was considered to be subacute and was managed conservatively. She was also Dx with L LE DVT. Temporary IVC was placed. She was DC off anticoagulation due to high risk of falling. No other complaints was voiced by pt. Her MS is at baseline per family. Vs: 98.6 °F (37 °C) - 107 - 131/84 - 16 - 98% RA    We were asked to admit for work up and evaluation of the above problems. Past Medical History:   Diagnosis Date    Alzheimer's dementia     Diagnosed while in rehab recovering from colon resection.  Chronic pain     lower back    History of colonic polyps     Tubular adenoma excised colonoscopically on 2016.  Hypertension     Lipoma of colon     Resected on 2016. Past Surgical History:   Procedure Laterality Date    HX  SECTION      HX COLONOSCOPY  2016    Dr. Cata Washburn.  HX OTHER SURGICAL  2016    Reduction of intussusception, sigmoid colon resection, and creation of end colostomy and Osito's pouch to treat colonic obstruction secondary to colorectal intussusception of a sigmoid colon lipoma; Dr. Cata Washburn.        Social History   Substance Use Topics    Smoking status: Never Smoker  Smokeless tobacco: Never Used    Alcohol use No        Family History   Problem Relation Age of Onset    Cancer Sister      pancreatic    Anesth Problems Neg Hx      No Known Allergies     Prior to Admission medications    Medication Sig Start Date End Date Taking? Authorizing Provider   donepezil (ARICEPT) 5 mg tablet Take 10 mg by mouth nightly. Yes Historical Provider   acetaminophen (TYLENOL) 500 mg tablet Take 500 mg by mouth every eight (8) hours as needed for Pain. Yes Historical Provider   senna-docusate (PERICOLACE) 8.6-50 mg per tablet Take 1 Tab by mouth nightly. Yes Historical Provider   COD LIVER OIL/ZINC OXIDE (DESITIN EX) by Apply Externally route nightly. Patient's niece stated that she applies this to her lower back and buttocks for sores and itching. Yes Historical Provider   FOLIC ACID/MULTIVIT-MIN/LUTEIN (CENTRUM SILVER PO) Take 1 Tab by mouth nightly. Yes Historical Provider       REVIEW OF SYSTEMS:     I am not able to complete the review of systems because: The patient is intubated and sedated    The patient has altered mental status due to his acute medical problems    The patient has baseline aphasia from prior stroke(s)   y The patient has baseline dementia and is not reliable historian    The patient is in acute medical distress and unable to provide information             Objective:   VITALS:    Visit Vitals    /82    Pulse (!) 107    Temp 98.6 °F (37 °C)    Resp 16    Ht 5' 4\" (1.626 m)    Wt 40.8 kg (90 lb)    SpO2 99%    BMI 15.45 kg/m2       PHYSICAL EXAM:    General:    Alert, cooperative, no distress, appears stated age. Cachetic    HEENT: Atraumatic, anicteric sclerae, pink conjunctivae     No oral ulcers, mucosa dry, throat clear, dentition fair  Neck:  Supple, symmetrical,  thyroid: non tender  Lungs:   Clear to auscultation bilaterally. No Wheezing or Rhonchi. No rales. Chest wall:  No tenderness  No Accessory muscle use.   Heart:   Regular rhythm,  No  murmur   No edema  Abdomen:   Soft, non-tender. Not distended. Bowel sounds normal  Extremities: No cyanosis. No clubbing,      Skin turgor normal, Capillary refill normal, Radial dial pulse 2+                          L LE thigh swelling and deformity, LE rotated to the right    Skin:     Not pale. Not Jaundiced  No rashes   Psych:  Poor insight. Not depressed. Not anxious or agitated. Neurologic: EOMs intact. No facial asymmetry. No aphasia or slurred speech. Symmetrical strength, Sensation grossly intact. Alert and oriented X 1     _______________________________________________________________________  Care Plan discussed with:    Comments   Patient y    Family  y Family bedside    RN y    Care Manager                    Consultant:  jennifer ED provider    _______________________________________________________________________  Expected  Disposition:   Home with Family    HH/PT/OT/RN    SNF/LTC y   [de-identified]    ________________________________________________________________________  TOTAL TIME:  72  Minutes    Critical Care Provided     Minutes non procedure based      Comments    y Reviewed previous records    y Discussion with patient and/or family and questions answered       ________________________________________________________________________  Signed: Dana Johnson MD    Procedures: see electronic medical records for all procedures/Xrays and details which were not copied into this note but were reviewed prior to creation of Plan.     LAB DATA REVIEWED:    Recent Results (from the past 24 hour(s))   CBC WITH AUTOMATED DIFF    Collection Time: 07/26/17  3:12 PM   Result Value Ref Range    WBC 10.3 3.6 - 11.0 K/uL    RBC 4.63 3.80 - 5.20 M/uL    HGB 14.3 11.5 - 16.0 g/dL    HCT 43.9 35.0 - 47.0 %    MCV 94.8 80.0 - 99.0 FL    MCH 30.9 26.0 - 34.0 PG    MCHC 32.6 30.0 - 36.5 g/dL    RDW 16.5 (H) 11.5 - 14.5 %    PLATELET 489 162 - 917 K/uL    NEUTROPHILS 73 32 - 75 %    LYMPHOCYTES 20 12 - 49 % MONOCYTES 6 5 - 13 %    EOSINOPHILS 1 0 - 7 %    BASOPHILS 0 0 - 1 %    ABS. NEUTROPHILS 7.6 1.8 - 8.0 K/UL    ABS. LYMPHOCYTES 2.0 0.8 - 3.5 K/UL    ABS. MONOCYTES 0.6 0.0 - 1.0 K/UL    ABS. EOSINOPHILS 0.1 0.0 - 0.4 K/UL    ABS. BASOPHILS 0.0 0.0 - 0.1 K/UL   METABOLIC PANEL, COMPREHENSIVE    Collection Time: 07/26/17  3:12 PM   Result Value Ref Range    Sodium 148 (H) 136 - 145 mmol/L    Potassium 2.9 (L) 3.5 - 5.1 mmol/L    Chloride 112 (H) 97 - 108 mmol/L    CO2 32 21 - 32 mmol/L    Anion gap 4 (L) 5 - 15 mmol/L    Glucose 117 (H) 65 - 100 mg/dL    BUN 36 (H) 6 - 20 MG/DL    Creatinine 0.58 0.55 - 1.02 MG/DL    BUN/Creatinine ratio 62 (H) 12 - 20      GFR est AA >60 >60 ml/min/1.73m2    GFR est non-AA >60 >60 ml/min/1.73m2    Calcium 8.8 8.5 - 10.1 MG/DL    Bilirubin, total 0.6 0.2 - 1.0 MG/DL    ALT (SGPT) 30 12 - 78 U/L    AST (SGOT) 26 15 - 37 U/L    Alk.  phosphatase 111 45 - 117 U/L    Protein, total 7.1 6.4 - 8.2 g/dL    Albumin 3.2 (L) 3.5 - 5.0 g/dL    Globulin 3.9 2.0 - 4.0 g/dL    A-G Ratio 0.8 (L) 1.1 - 2.2     URINALYSIS W/MICROSCOPIC    Collection Time: 07/26/17  4:22 PM   Result Value Ref Range    Color YELLOW/STRAW      Appearance CLOUDY (A) CLEAR      Specific gravity 1.026 1.003 - 1.030      pH (UA) 5.0 5.0 - 8.0      Protein 100 (A) NEG mg/dL    Glucose NEGATIVE  NEG mg/dL    Ketone TRACE (A) NEG mg/dL    Bilirubin NEGATIVE  NEG      Blood LARGE (A) NEG      Urobilinogen 0.2 0.2 - 1.0 EU/dL    Nitrites POSITIVE (A) NEG      Leukocyte Esterase MODERATE (A) NEG      WBC 20-50 0 - 4 /hpf    RBC 0-5 0 - 5 /hpf    Epithelial cells FEW FEW /lpf    Bacteria 4+ (A) NEG /hpf    Amorphous Crystals 1+ (A) NEG   PROTHROMBIN TIME + INR    Collection Time: 07/26/17  4:22 PM   Result Value Ref Range    INR 1.0 0.9 - 1.1      Prothrombin time 10.2 9.0 - 11.1 sec

## 2017-07-26 NOTE — ED NOTES
Time out performed, ortho present to perform closed reduction, Dr. Barrett Yanes present for sedation and airway management. Consents signed and on chart. Family in waiting room and updated on plan.

## 2017-07-27 NOTE — PROGRESS NOTES
Speech Pathology  Orders received, chart reviewed and note patient is NPO for ORIF this afternoon. SLP will f/u this pm vs tomorrow pending sx time and appropriateness for PO afterwards.    Thanks,   Coby Boyle M.S. CCC-SLP

## 2017-07-27 NOTE — PERIOP NOTES
1837-Handoff Report from Operating Room to PACU    Report received from [address] and Hilario Glasgow CRNA regarding [place]. Surgeon(s):  Angelito Montanez DO  And Procedure(s) (LRB):  FEMUR INTRA MEDULLARY NAIL LEFT (Left)  confirmed   with allergies and dressings discussed. Anesthesia type, drugs, patient history, complications, estimated blood loss, vital signs, intake and output, and last pain medication, lines, reversal medications and temperature were reviewed. 0- Family updated. 1945- TRANSFER - OUT REPORT:    Verbal report given to [facility] RN(name) on [place]  being transferred to ortho(unit) for routine post - op       Report consisted of patients Situation, Background, Assessment and   Recommendations(SBAR). Information from the following report(s) SBAR, Kardex, OR Summary, Procedure Summary, Intake/Output, MAR and Recent Results was reviewed with the receiving nurse. Opportunity for questions and clarification was provided.       Patient transported with:   Registered Nurse  Tech

## 2017-07-27 NOTE — ANESTHESIA POSTPROCEDURE EVALUATION
Post-Anesthesia Evaluation and Assessment    Patient: Alicia Calloway MRN: [id number]  SSN: [id number]    YOB: 1935  Age: 80 y.o. Sex: female       Cardiovascular Function/Vital Signs  Visit Vitals    /72    Pulse (!) 102    Temp 36.4 °C (97.6 °F)    Resp 13    Ht 5' 4\" (1.626 m)    Wt 40.8 kg (90 lb)    SpO2 100%    BMI 15.45 kg/m2       Patient is status post general anesthesia for Procedure(s): FEMUR INTRA MEDULLARY NAIL LEFT. Nausea/Vomiting: None    Postoperative hydration reviewed and adequate. Pain:  Pain Scale 1: Numeric (0 - 10) (07/27/17 1900)  Pain Intensity 1: 0 (07/27/17 1900)   Managed    Neurological Status:   Neuro (WDL): Exceptions to WDL (07/27/17 1837)  Neuro  Neurologic State: Drowsy; Eyes open to voice (07/27/17 1837)  Orientation Level: Oriented to person (07/27/17 1837)  Cognition: Follows commands (07/27/17 1837)  Speech: Clear (07/27/17 1837)  LUE Motor Response: Weak (07/27/17 1837)  LLE Motor Response: Weak (07/27/17 1837)  RUE Motor Response: Weak (07/27/17 1837)  RLE Motor Response: Weak (07/27/17 1837)   At baseline    Mental Status and Level of Consciousness: Arousable    Pulmonary Status:   O2 Device: Room air (07/27/17 1900)   Adequate oxygenation and airway patent    Complications related to anesthesia: None    Post-anesthesia assessment completed.  No concerns    Signed By: Delano Rowland MD     July 27, 2017

## 2017-07-27 NOTE — PROGRESS NOTES
Occupational Therapy Screening:  Services are not indicated at this time and will not be needed after hip fracture repair. An [facility] screening referral was triggered for occupational therapy based on results obtained during the nursing admission assessment. The patients chart was reviewed and the patient is not appropriate for a skilled therapy evaluation at this time. Please consult occupational therapy if any therapy needs arise. Thank you.     Radha Holland OTR/MICHAEL

## 2017-07-27 NOTE — PROGRESS NOTES
Physical Therapy Screening:  Services are not indicated at this time. [facility] was triggered for physical therapy based on results obtained during the nursing admission assessment. The patients chart was reviewed and the patient is not appropriate for a skilled therapy evaluation at this time. Please consult physical therapy if any therapy needs arise. Thank you.     Liana Farfan, PT, DPT

## 2017-07-27 NOTE — PERIOP NOTES
TRANSFER - IN REPORT:    Verbal report received from [facility] on Johanna Hippo  being received from [phone] for ordered procedure      Report consisted of patients Situation, Background, Assessment and   Recommendations(SBAR). Information from the following report(s) SBAR, Kardex, Intake/Output, MAR and Recent Results was reviewed with the receiving nurse. Opportunity for questions and clarification was provided. Assessment completed upon patients arrival to unit and care assumed.

## 2017-07-27 NOTE — ANESTHESIA PREPROCEDURE EVALUATION
Anesthetic History     PONV          Review of Systems / Medical History  Patient summary reviewed, nursing notes reviewed and pertinent labs reviewed    Pulmonary  Within defined limits                 Neuro/Psych         Dementia    Comments: Alzheimer's Cardiovascular    Hypertension              Exercise tolerance: <4 METS  Comments: EF 60-65%     GI/Hepatic/Renal  Within defined limits              Endo/Other  Within defined limits           Other Findings   Comments: Fracture left femur  Chronic lower back pain           Physical Exam    Airway  Mallampati: II  TM Distance: > 6 cm  Neck ROM: normal range of motion   Mouth opening: Normal     Cardiovascular  Regular rate and rhythm,  S1 and S2 normal,  no murmur, click, rub, or gallop             Dental    Dentition: Poor dentition  Comments: Some missing, some loose   Pulmonary  Breath sounds clear to auscultation               Abdominal  GI exam deferred       Other Findings            Anesthetic Plan    ASA: 2  Anesthesia type: general          Induction: Intravenous  Anesthetic plan and risks discussed with: Patient

## 2017-07-27 NOTE — PROGRESS NOTES
Hospitalist Progress Note    NAME: Ko Bro   :  1935   MRN:  [id number]       Assessment / Plan:  Acute fracture of the left mid shaft femur  Healing left hip fracture  -plan for ORIF today. Cardiac clearance done on admission  -pain and DVT prophylaxis as per ortho team  -bowel regimen added (miralax, dulcolax)  -monitor closely for delirium, would limit narcotic use     Aspiration risk  -consult SLP (can be done after surgery)  -aspiration precaution    Hypernatremia   -due to dehydration with poor po intake, significantly improved with D51/2ns  -cont' IVF for now  -monitor lab    Hypokalemia  -repleted, monitor    UTI  -f/u with urine cx  -cont' empiric rocephin     Alzheimer dementia  -high risk for delirium, sundowning  -limit narcotic use if possible/avoid benzos  -cont' aricept  - palliative care team to help with care decisions      Left LE DVT 2017  -S/p IVC  -not on anticoagulation due to high risk of falls      Severe protein calory malnutrition  -albumin 1.9   -nutritional consult   -added ensure         Code Status: DNR d/w son at bedside   Surrogate Decision Maker: son      DVT Prophylaxis: will be per ortho per protocol   GI Prophylaxis: not indicated     Baseline:lives with family; bed bound since April s/p fall; dementia; lives with son at home      Subjective:     Chief Complaint / Reason for Physician Visit  Pt is in bed, awake. Appears to be confused/?baseline dementia. No acute complaints. Discussed with RN events overnight. Review of Systems:  Symptom Y/N Comments  Symptom Y/N Comments   Fever/Chills    Chest Pain     Poor Appetite    Edema     Cough    Abdominal Pain     Sputum    Joint Pain     SOB/JOE    Pruritis/Rash     Nausea/vomit    Tolerating PT/OT     Diarrhea    Tolerating Diet     Constipation    Other       Could NOT obtain due to: dementia     Objective:     VITALS:   Last 24hrs VS reviewed since prior progress note.  Most recent are:  Patient Vitals for the past 24 hrs:   Temp Pulse Resp BP SpO2   07/27/17 0347 97.6 °F (36.4 °C) 84 18 142/76 98 %   07/26/17 2312 97.6 °F (36.4 °C) 82 16 138/80 100 %   07/26/17 2158 97.5 °F (36.4 °C) 73 16 138/74 100 %   07/26/17 1924 - 86 13 - 100 %   07/26/17 1915 - 92 14 (!) 164/98 100 %   07/26/17 1900 - 82 17 (!) 165/91 100 %   07/26/17 1858 - 89 18 156/79 100 %   07/26/17 1856 - 85 17 (!) 164/102 100 %   07/26/17 1854 - 86 18 (!) 171/102 100 %   07/26/17 1850 - 97 18 (!) 164/98 100 %   07/26/17 1848 - 91 16 116/86 97 %   07/26/17 1846 - 84 16 124/67 98 %   07/26/17 1844 - 85 17 139/80 100 %   07/26/17 1830 - 85 18 146/87 100 %   07/26/17 1815 - 83 16 151/88 100 %   07/26/17 1745 - - - 139/83 98 %   07/26/17 1730 - - - 135/81 -   07/26/17 1613 - - - - 99 %   07/26/17 1600 - - - - 98 %   07/26/17 1500 - - - 120/82 98 %   07/26/17 1445 - - - 131/84 98 %   07/26/17 1444 98.6 °F (37 °C) (!) 107 16 131/84 98 %   07/26/17 1439 - - - 125/83 97 %       Intake/Output Summary (Last 24 hours) at 07/27/17 0704  Last data filed at 07/27/17 0347   Gross per 24 hour   Intake              220 ml   Output                1 ml   Net              219 ml        PHYSICAL EXAM:  General: Thin female, cooperative, no acute distress    EENT:  EOMI. Anicteric sclerae. MMM  Resp:  CTA bilaterally, no wheezing or rales. No accessory muscle use  CV:  Regular  rhythm,  No edema  GI:  Soft, Non distended, Non tender.  +Bowel sounds  Neurologic:  Alert and oriented X 0, normal speech  Psych:   Unable to do but does not appear to be anxious or agitated  Skin:  No rashes.   No jaundice    Reviewed most current lab test results and cultures  YES  Reviewed most current radiology test results   YES  Review and summation of old records today    NO  Reviewed patient's current orders and MAR    YES  PMH/SH reviewed - no change compared to H&P  ________________________________________________________________________  Care Plan discussed with:    Comments Patient x    Family      RN x    Care Manager     Consultant                        Multidiciplinary team rounds were held today with , nursing, pharmacist and clinical coordinator. Patient's plan of care was discussed; medications were reviewed and discharge planning was addressed. ________________________________________________________________________  Total NON critical care TIME:  45   Minutes    Total CRITICAL CARE TIME Spent:   Minutes non procedure based      Comments   >50% of visit spent in counseling and coordination of care     ________________________________________________________________________  Yissel Ontiveros MD     Procedures: see electronic medical records for all procedures/Xrays and details which were not copied into this note but were reviewed prior to creation of Plan. LABS:  I reviewed today's most current labs and imaging studies.   Pertinent labs include:  Recent Labs      07/27/17   0359  07/26/17   1512   WBC  11.2*  10.3   HGB  12.3  14.3   HCT  39.3  43.9   PLT  170  207     Recent Labs      07/27/17   0359  07/26/17   1622  07/26/17   1512   NA  144   --   148*   K  3.7   --   2.9*   CL  111*   --   112*   CO2  28   --   32   GLU  91   --   117*   BUN  25*   --   36*   CREA  0.37*   --   0.58   CA  8.3*   --   8.8   MG  2.2   --    --    PHOS  2.4*   --    --    ALB   --    --   3.2*   TBILI   --    --   0.6   SGOT   --    --   26   ALT   --    --   30   INR   --   1.0   --        Signed: Yissel Ontiveros MD

## 2017-07-27 NOTE — CONSULTS
[address]   [address] [place] [address] Road       Name:  Ronny Hoffman   MR#:  [id number]   :  1935   Account #:  [de-identified]    Date of Consultation:  2017   Date of Adm:  2017       REASON FOR CONSULTATION: Left femur fracture. CHIEF COMPLAINT: Left thigh pain. HISTORY OF CHIEF COMPLAINT: The patient is a 53-year-old   demented female who presented to the hospital today after a fall out of   bed, when she was at home with family. She has been bed ridden for   the last few months after she sustained a significant left hip injury that   was treated nonoperatively. The family notes that she has significant   Alzheimer's dementia and therefore her history was obtained from   them today. No other injuries were noted with her fall, according to   family. She currently denies any other pain other than her left thigh. The family also notes her significant decline in overall health that   occurred surrounding her hip fracture back in April. There were talks   about hospice care at that time. She has been minimally active   recently. PAST MEDICAL HISTORY   Includes:    1. Alzheimer's dementia. 2. Hypertension. PAST SURGICAL HISTORY   Includes:    1. . 2. Colonoscopy. MEDICATIONS   Include:     1. Aricept. 2. Tylenol. 3. Janki-Colace. 4. Desitin. 5. Multivitamin. ALLERGIES: NO KNOWN DRUG ALLERGIES. REVIEW OF SYSTEMS   Family notes that the patient has had decreased appetite recently. She   has been lethargic and bedridden. She currently denies any chest pain   or shortness of breath. She notes pain in her left thigh. FAMILY HISTORY: Noncontributory. PHYSICAL EXAMINATION   VITAL SIGNS: The patient is currently afebrile and slightly tachycardic   at 107. Her other vital signs are stable. GENERAL: The patient is in no acute distress. She is thin and   emaciated.    HEENT: Normocephalic, atraumatic. RESPIRATIONS: Normal respirations. EXTREMITIES: The patient is lying in bed with her left leg malrotated. There is mild ecchymosis and swelling in the left thigh. Compartments   are soft and compressible. She has palpable distal pulses. IMAGING: X-rays and CT scan of the left femur show evidence of a   comminuted mid shaft spiral femur fracture that propagates distally to   the distal femoral metaphysis. No intraarticular involvement noted. There is evidence of a chronic left femur fracture that is healing into a   malunion. LABORATORY DATA: White blood cell count 10.3, hemoglobin 14.3,   hematocrit 43.9, platelets 634. ASSESSMENT: A 53-year-old female with a comminuted and   displaced spiral fracture of the left femur with a chronic malunion of the   left hip. PLAN: The findings were discussed with the patient and her family   members today. We discussed the significant injuries that have   occurred to the left femur. We  discussed concerns of the significant   malunion and nonunion that is noted on CT scan at the proximal femur   and hip. She has a new fracture at the midshaft femur that has   significant displacement at this time. This was splinted in a position of   comfort for the patient. She is being admitted to the hospital by the   medical team. She was found to be in very poor health and   malnourished. There are significant concerns for her overall medical   status during her admission and for her procedure. I had a discussion   with the patient's family regarding appropriate treatment options at this   time. She has been bed ridden and is minimally active at this time. We   discussed her current femur fracture and treatment options. I feel that   she should undergo surgical fixation of this left femur fracture as it will   be impossible to stabilize this fracture and control her pain, otherwise. We discussed different surgical treatment options.  I feel that   stabilization with a retrograde intramedullary nail would best suit her at   this time as this would be the most minimally invasive procedure we   could to stabilize her fracture. I did discuss concerns about the   malunion at the hip. We discussed that this surgery will not help her to   become more active for ambulate. This surgery will strictly be to   provide her with comfort and stabilization of her mid shaft femur   fracture there propagates distally. We discussed that if she does   recover well and they wish to pursue any surgical treatment of the   proximal femur malunion/nonunion in the future, that this would take a   significant procedure and likely proximal femoral replacement type   procedure to allow for appropriate healing. They agree with the plan for   her retrograde nailing to stabilize her fracture at this time. Due to her overall significant malnourishment and overall health status,   I feel that this is the most appropriate procedure for her that she will be   able to tolerate. We will medically optimize her with the medical team   overnight. We will make her n.p.o. after midnight. I will plan for her   procedure tomorrow. We will continue with pain control and comfort   care overnight. Medicine has consulted the palliative care team as   well.         Zachery Esquivel DO DD / LASHAY   D:  07/26/2017   22:45   T:  07/27/2017   05:45   Job #:  [id number]

## 2017-07-27 NOTE — PROGRESS NOTES
Initial Nutrition Assessment:    INTERVENTIONS/RECOMMENDATIONS:   · Diet per SLP recs  · Ensure TID once diet is advanced  · May need MVI, depending on PO intake    ASSESSMENT:   Chart reviewed, medically noted for fracture of the left mid shaft femur, past hip fracture from April, Alzheimer dementia, severe kcal and protein malnutrition and PMH provided below. No family at bedside this morning and pt noted for poor historian. Unable to obtain nutrition history however chart review shows ~13% wt loss over the past 3 months since her hip fracture in April. Printed material on strategies and recipes to increase kcal and protein intake were left in room. Will return once family is present to go over material and get better idea of pts eating patterns PTA. Past Medical History:   Diagnosis Date    Alzheimer's dementia     Diagnosed while in rehab recovering from colon resection.  Chronic pain     lower back    History of colonic polyps     Tubular adenoma excised colonoscopically on 4/20/2016.  Hypertension     Lipoma of colon     Resected on 1/5/2016. Diet Order: NPO  % Eaten:  No data found. Pertinent Medications: [x]Reviewed []Other (NS, colace, miralax, senna-docusate)  Pertinent Labs: [x]Reviewed []Other (phos 2.4)  Food Allergies: [x]NKFA  []Other   Last BM:      Skin:    [x] Intact   [] Incision  [] Breakdown  [] Other:     Wt Readings from Last 30 Encounters:   07/26/17 40.8 kg (90 lb)   04/21/17 47.1 kg (103 lb 13.4 oz)   01/19/17 45.8 kg (101 lb)   06/16/16 46.2 kg (101 lb 13.6 oz)   06/09/16 45.8 kg (101 lb)   04/20/16 45.4 kg (100 lb)   01/11/16 44.5 kg (98 lb)       Anthropometrics:   Height: 5' 4\" (162.6 cm) Weight: 40.8 kg (90 lb)   IBW (%IBW):   ( ) UBW (%UBW):   (  %)   Last Weight Metrics:  Weight Loss Metrics 7/26/2017 4/21/2017 1/19/2017 6/16/2016 6/14/2016 6/9/2016 4/20/2016   Today's Wt 90 lb 103 lb 13.4 oz 101 lb 101 lb 13.6 oz - 101 lb 100 lb   BMI 15.45 kg/m2 20.28 kg/m2 19.73 kg/m2 - 18.62 kg/m2 18.47 kg/m2 19.53 kg/m2       BMI: Body mass index is 15.45 kg/(m^2). This BMI is indicative of:   [x]Underweight    []Normal    []Overweight    [] Obesity   [] Extreme Obesity (BMI>40)     Estimated Nutrition Needs (Based on):   1300 Kcals/day (MSJ: 875 x 1.5 due to low BMI and hip frx) , 60 g (1.5 g/kg) Protein  Carbohydrate: At Least 130 g/day  Fluids: 1300 mL/day (1ml/kcal) or per primary team    NUTRITION DIAGNOSES:   Problem:  Increased nutrient needs (protein ) Underweight    Etiology: related to hip fracture Alzheimer dementia   Signs/Symptoms: as evidenced by pt going for surgical fixation today (7/27) BMI of 15    NUTRITION INTERVENTIONS:  Meals/Snacks: General/healthful diet   Supplements: Commercial supplement              GOAL:   Consume >25% of meals and ONS in 2-4 days once diet advanced    LEARNING NEEDS (Diet, Food/Nutrient-Drug Interaction):    [] None Identified   [] Identified and Education Provided/Documented   [x] Identified and Pt declined/was not appropriate     Cultureal, Baptist, OR Ethnic Dietary Needs:    [x] None Identified   [] Identified and Addressed     [x] Interdisciplinary Care Plan Reviewed/Documented    [x] Discharge Planning:   Kcal and protein dense food, 6 meals per day, nutrition supplements prn to meet protein and kcal needs    MONITORING /EVALUATION:      Food/Nutrient Intake Outcomes:  Total energy intake  Physical Signs/Symptoms Outcomes: Weight/weight change, Electrolyte and renal profile, GI profile, Glucose profile, GI    NUTRITION RISK:    [x] High              [] Moderate           []  Low  []  Minimal/Uncompromised    PT SEEN FOR:    [x]  MD Consult: []Calorie Count      []Diabetic Diet Education        []Diet Education     []Electrolyte Management     [x]General Nutrition Management and Supplements     []Management of Tube Feeding     []TPN Recommendations    [x]  RN Referral:  [x]MST score >=2     []Enteral/Parenteral Nutrition PTA     []Pregnant: Gestational DM or Multigestation     []Pressure Ulcer/Wound Care needs        []  Low BMI  []  DTR Referral       You Baptiste RDN  Pager [phone]  Weekend Pager [phone]

## 2017-07-27 NOTE — PROGRESS NOTES
Bedside and Verbal shift change report given to Magnolia RN (oncoming nurse) by Dyan Howard RN (offgoing nurse). Report included the following information SBAR, Kardex, ED Summary, Intake/Output, MAR and Recent Results.

## 2017-07-27 NOTE — PROGRESS NOTES
Pressure Ulcer Prevention In basket Alert Received for Lavon < 14 (moderate risk).      Suggested Care Plan/Interventions for Nursing  1. Complete [place] Pressure Ulcer Risk Scale and use sub scores to identify appropriate interventions. 2. Perform Assessment: skin, changes in LOC, visual cues for pain, monitor skin under medical devices  3. Respond to Reduced Sensory Perception: changes in LOC, check visual cues for pain, float heels, suspension boots, pressure redistribution bed/mattress/chair cushion, turning and reposition approximately every 2 hours (pillows & wedges), pad between skin to skin, turn & reposition  4. Manage Moisture: absorbent under pads, internal / external urinary device, internal /  external fecal device, minimize layers, contain wound drainage, access need for specialty bed, limit adult briefs, maintain skin hydration (lotion/cream), moisture barrier, offer toileting every hour  5. Promote Activity: increase time out of bed, chair cushion, PT/OT evaluation, trapeze to reposition, pressure redistribution bed/mattress/chair  6. Address Reduced Mobility: float heels / suspension boot, HOB 30 degrees or less, pressure redistribution bed/mattress/cushion, PT / OT evaluation, turn and reposition approximately every 2 hours (pillows & wedges)  7. Promote Nutrition: document food / fluid / supplement intake, encourage/assist with meals as needed  8. Reduce Friction and Shear: transferring/repositioning devices (lift/draw sheet), lift team/ patient mobility team, feet elevated on foot rest, minimize layers, foam dressing / transparent film / skin sealants, protective barrier creams and emollients, transfer aides (board, Saqib lift, ceiling lift, stand assist), HOB 30 degrees or less, trapeze to reposition.   Wound Care Team

## 2017-07-28 NOTE — PROGRESS NOTES
Bedside shift change report given to Nia Alexander (oncoming nurse) by Mark Adames (offgoing nurse). Report included the following information SBAR, Intake/Output, MAR, Accordion and Recent Results.

## 2017-07-28 NOTE — PROGRESS NOTES
Orders received, chart reviewed. Per chart review, pt with history of alzheimer's dementia and has been bed bound since April. Attempted to see pt this AM for PT evaluation. Patient mainly speaking [place], with brief use of English at times. Patient adamantly declining to sit EOB waving her hands at this author, and stating \"no\". Pt resistant to this author's attempts at mobilization and was not following any commands for assessment of ROM in bed. She does appear to be responding to questions in [place]. Evaluation aborted. Will follow up on Monday for PT evaluation. Pt is likely close to her baseline level with h/o Alzheimer's greatly impacting her ability to actively participate in therapy evaluation at this time. Repositioned pt for comfort as he was lying perpendicularly across mattress. Dante Roblero.  NOHELIA FieldT

## 2017-07-28 NOTE — PROGRESS NOTES
POD 1 Day Post-Op    Procedure:  Procedure(s) with comments:  FEMUR INTRA MEDULLARY NAIL LEFT - FEMUR INTRA MEDULLARY NAILING RETROGRADE,left synthes    Subjective:     Patient doing well, complaining of appropriate post-op pain. Pleasantly confused. Objective:     Blood pressure 120/62, pulse 82, temperature 97.8 °F (36.6 °C), resp. rate 18, height 5' 4\" (1.626 m), weight 40.8 kg (90 lb), SpO2 99 %. Temp (24hrs), Av.9 °F (36.6 °C), Min:97.5 °F (36.4 °C), Max:99 °F (37.2 °C)      Physical Exam:  Examination of the left femur reveals that the incision is clean, dry and intact. Sensation is intact to light touch.  mild swelling. No calf pain.   NVSI    Labs:   Lab Results   Component Value Date/Time    HGB 10.5 2017 04:14 AM         Assessment:     Principal Problem:    Femur fracture ([address] (2017)        Procedure(s) with comments:  FEMUR INTRA MEDULLARY NAIL LEFT - FEMUR INTRA MEDULLARY NAILING [facility] synthes    Plan/Recommendations/Medical Decision Making:     - pain control   - ice   - pt/ot - nwb LLE, has been bed bound per family for months  - dvt prophylaxis - lovenox  - d/c planning - home vs snf        Yarelis Barrett DO

## 2017-07-28 NOTE — PROGRESS NOTES
OT referral received, chart reviewed, and attempted to see patient for OT evaluation. Upon attempt to see patient for evaluation patient mainly speaking [place], with brief use of English at times. Patient adamantly declining to sit EOB waving her hands at this OT and the PT, and stating \"no\". Patient also not following any commands for assessment of ROM in bed. She does appear to be responding to questions in [place]. Evaluation aborted and OT will defer evaluation until Monday. Per chart the patient has been bed bound since April and her h/o Alzheimer's is  greatly impacting her ability to participate in therapy evaluation at this time.

## 2017-07-28 NOTE — PROGRESS NOTES
Hospitalist Progress Note    NAME: El Bob   :  1935   MRN:  [id number]       Assessment / Plan:  Acute fracture of the left mid shaft femur  Healing left hip fracture  -s/p L retrograde intramedullary nailing  -pain and DVT prophylaxis as per ortho team, has not required any narcotics overnight. Pain appears to be well controlled with just tylenol  -bowel regimen added (miralax, dulcolax)  -monitor closely for delirium, would limit narcotic use  -PT/OT/CM, pt is NWB LLE, has been bed bound for months as per family    Aspiration risk  -consult SLP (can be done after surgery)  -aspiration precaution    Hypernatremia, resolved  -due to dehydration with poor po intake  -cont' IVF for now  -monitor lab    Hypokalemia  -repleted, monitor    UTI  -urine cx showed gram neg mikie  -cont' empiric rocephin     Alzheimer dementia  -high risk for delirium, sundowning  -limit narcotic use if possible/avoid benzos  -cont' aricept  - palliative care team to help with care decisions      Left LE DVT 2017   -S/p IVC  -not on anticoagulation due to high risk of falls      Severe protein calory malnutrition  -albumin 1.9   -nutritional consult   -added ensure         Code Status: DNR   Surrogate Decision Maker: son      DVT Prophylaxis: will be per ortho per protocol   GI Prophylaxis: not indicated     Baseline:lives with family; bed bound since April s/p fall; dementia; lives with son at home      Subjective:     Chief Complaint / Reason for Physician Visit  Pt is in bed, awake. No acute complaints. Discussed with RN events overnight.      Review of Systems:  Symptom Y/N Comments  Symptom Y/N Comments   Fever/Chills    Chest Pain     Poor Appetite    Edema     Cough    Abdominal Pain     Sputum    Joint Pain     SOB/JOE    Pruritis/Rash     Nausea/vomit    Tolerating PT/OT     Diarrhea    Tolerating Diet     Constipation    Other       Could NOT obtain due to: dementia     Objective:     VITALS:   Last 24hrs VS reviewed since prior progress note. Most recent are:  Patient Vitals for the past 24 hrs:   Temp Pulse Resp BP SpO2   07/28/17 0412 97.8 °F (36.6 °C) 82 18 120/62 99 %   07/27/17 2336 97.6 °F (36.4 °C) 80 17 125/78 100 %   07/27/17 2130 97.5 °F (36.4 °C) 78 16 120/82 100 %   07/27/17 2100 97.5 °F (36.4 °C) 76 16 105/77 100 %   07/27/17 2030 - 81 16 116/79 99 %   07/27/17 2000 97.5 °F (36.4 °C) 89 16 120/82 100 %   07/27/17 1945 97.6 °F (36.4 °C) 94 13 139/79 99 %   07/27/17 1930 - 93 13 134/84 98 %   07/27/17 1915 - 98 12 142/84 98 %   07/27/17 1900 - (!) 102 13 133/72 100 %   07/27/17 1850 - (!) 102 15 139/77 100 %   07/27/17 1845 - (!) 101 23 140/80 100 %   07/27/17 1840 - 99 14 140/69 100 %   07/27/17 1838 97.6 °F (36.4 °C) 99 15 132/79 100 %   07/27/17 1837 - - - 132/79 -   07/27/17 1606 98.5 °F (36.9 °C) 81 18 125/56 100 %   07/27/17 1548 99 °F (37.2 °C) 78 18 139/62 94 %   07/27/17 1133 98 °F (36.7 °C) 77 18 133/70 97 %   07/27/17 0753 98 °F (36.7 °C) 80 18 138/77 97 %       Intake/Output Summary (Last 24 hours) at 07/28/17 0740  Last data filed at 07/27/17 2249   Gross per 24 hour   Intake          1163.75 ml   Output               50 ml   Net          1113.75 ml        PHYSICAL EXAM:  General: Thin female, cooperative, no acute distress    EENT:  EOMI. Anicteric sclerae. MMM  Resp:  CTA bilaterally, no wheezing or rales. No accessory muscle use  CV:  Regular  rhythm,  No edema  GI:  Soft, Non distended, Non tender.  +Bowel sounds  Neurologic:  Alert and oriented X 1, normal speech  Psych:   Unable to do but does not appear to be anxious or agitated  Skin:  No rashes.   No jaundice    Reviewed most current lab test results and cultures  YES  Reviewed most current radiology test results   YES  Review and summation of old records today    NO  Reviewed patient's current orders and MAR    YES  PMH/SH reviewed - no change compared to H&P  ________________________________________________________________________  Care Plan discussed with:    Comments   Patient x    Family      RN x    Care Manager     Consultant                        Multidiciplinary team rounds were held today with , nursing, pharmacist and clinical coordinator. Patient's plan of care was discussed; medications were reviewed and discharge planning was addressed. ________________________________________________________________________  Total NON critical care TIME:  35   Minutes    Total CRITICAL CARE TIME Spent:   Minutes non procedure based      Comments   >50% of visit spent in counseling and coordination of care     ________________________________________________________________________  Anil Colmenares MD     Procedures: see electronic medical records for all procedures/Xrays and details which were not copied into this note but were reviewed prior to creation of Plan. LABS:  I reviewed today's most current labs and imaging studies.   Pertinent labs include:  Recent Labs      07/28/17   0414  07/27/17   0359  07/26/17   1512   WBC  8.9  11.2*  10.3   HGB  10.5*  12.3  14.3   HCT  32.8*  39.3  43.9   PLT  155  170  207     Recent Labs      07/28/17   0414  07/27/17   0359  07/26/17   1622  07/26/17   1512   NA  143  144   --   148*   K  3.5  3.7   --   2.9*   CL  110*  111*   --   112*   CO2  27  28   --   32   GLU  74  91   --   117*   BUN  17  25*   --   36*   CREA  0.36*  0.37*   --   0.58   CA  8.1*  8.3*   --   8.8   MG   --   2.2   --    --    PHOS   --   2.4*   --    --    ALB   --    --    --   3.2*   TBILI   --    --    --   0.6   SGOT   --    --    --   26   ALT   --    --    --   30   INR   --    --   1.0   --        Signed: Anil Colmenares MD

## 2017-07-28 NOTE — PROGRESS NOTES
Bedside and Verbal shift change report given to Magnolia RN (oncoming nurse) by Dyan Howard RN (offgoing nurse). Report included the following information SBAR, Kardex, OR Summary, Procedure Summary, Intake/Output, MAR and Recent Results.

## 2017-07-28 NOTE — PROGRESS NOTES
Re: Lovenox (P&T/MEC approved dose change has been made on this patient)    Indication: VTE prevention  Crcl:   MD ordered dose: 40 mg sq daily  Pharmacy ordered dose change: 20 mg sq daily for low body weight of 40.8 kg    [place], [facility]

## 2017-07-28 NOTE — INTERDISCIPLINARY ROUNDS
Bedside interdisciplinary rounds were held today to discuss patient plan of care and outcomes. The following members were present: Nurse Practitioner, Nurse, Clinical Care Leader, Pharmacy, Physical Therapy, and Case Management. Plan:  + voiding, incontinent. Continue NWB LLE. Discharge plan pending - patient confused with no family present. Believe baseline to be bedbound.

## 2017-07-28 NOTE — OP NOTES
[address], [facility]   OP NOTE       Name:  Francia Baptiste   MR#:  [id number]   :  1935   Account #:  [de-identified]    Surgery Date:  2017   Date of Adm:  2017       PREOPERATIVE DIAGNOSIS: Comminuted and displaced left   midshaft femur fracture. POSTOPERATIVE DIAGNOSIS: Comminuted and displaced left   midshaft femur fracture. PROCEDURES PERFORMED: Retrograde intramedullary nailing, left   femur. SURGEON: Mike Rojas Session, DO     ASSISTANT: [id number] staff     ANESTHESIA: General.    ESTIMATED BLOOD LOSS: 50 mL. COMPLICATIONS: None. SPECIMENS REMOVED: None. DISPOSITION: Stable to PACU. INDICATIONS FOR PROCEDURE: The patient is a 60-year-old   female who presented to [facility] after a fall   out of bed. She lives at home with family and has been bedridden   since earlier in the year when she sustained a femoral   neck/intertrochanteric hip fracture that was treated conservatively. She   has been in very poor health, according to her family. They did   consider hospice at one point in time. They note that she is currently   not ambulatory. She had an obvious rotational deformity of the leg, and   workup in the ER revealed evidence of a spiral midshaft femur fracture   of the left. Complicating this matter was her proximal fracture that was   in stages of healing, as she had significant callus formation and   heterotopic bone formation at the hip. I did have a discussion with the   patient and with her family regarding appropriate treatment options, as   the patient has significant dementia. She had minimal range of motion   of the hip on the left secondary to her previous hip fracture that was   treated conservatively.  However, due to the significant unstable nature   of her left femur fracture, we did feel that it would be necessary to   surgically treat this in order to stabilize the hip for comfort care. We   discussed risks and benefits of the procedure. Risks of infection, blood   loss, neurovascular injury, anesthesia risk, and risks secondary to the   patient's comorbidities were explained. 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 The family agreed with this decision, and she was admitted to   the hospital by the medical team. She was medically optimized and   cleared prior to the procedure. The patient was seen in the preoperative holding area prior to her   procedure. She was made n.p.o. after midnight prior to the procedure. She was taken to the OR on 07/27/2017. DESCRIPTION OF PROCEDURE: The patient was taken to the OR   and transferred to the operating table after she was given sedation and   intubated by Anesthesia. Sterile prepping and draping was performed   after she was intubated by Anesthesia. After sterile prepping and   draping, a time-out was called. The patient was identified by name,   date of birth, operative site, and operative procedure. After all were in   agreement, the left femur was the operative extremity. A radiolucent   triangle was used to obtain some traction onto the femur. This also   allowed us to flex the knee approximately 30 degrees. We made an incision at the mid portion of the patellar tendon and   carefully dissected through the mid portion of the patellar tendon to   gain access to the insertion point and starting point of the retrograde   intramedullary mikie. We used a guide pin to find appropriate placement   of this starting point on AP and lateral views. We used x-ray to confirm. Once this was confirmed, we over-reamed with the entry reamer. We   then obtained our reduction at the fracture site. Once this reduction   was obtained, we reamed to the appropriate size.  She had a stove   pipe canal and had no catie on reaming. Therefore, we chose a size   13 nail. We measured the length for the nail. A size 280 mm retrograde   nail by [employer] was chosen. This gave us adequate restoration of   length and stabilization. This also helped us to avoid her hypertrophic   nonunion proximally at the hip. We placed this nail without difficulty. It   gave us an adequate reduction of our fracture site. We then placed the   distal locking screws. We placed a spiral blade in place of the most   distal locking screw. This gave us extra fixation of her osteoporotic   bone. We then moved proximally, and through perfect [place] technique,   we placed a proximal locking screw from anterior to posterior. Care   was taken to avoid neurovascular structures during the approach. Once this was placed, we took final x-rays to confirm appropriate   reduction of the fracture and stabilization. We thoroughly irrigated the incision sites. A combination of 0 Vicryl, 2-  0 Vicryl, and staples were used to close the wounds. The patient was   awakened by Anesthesia after we placed her in sterile dressings. She   was transferred to the PACU in stable condition. POSTOPERATIVE PLAN: We will follow her closely while she is in the   hospital. We will continue to monitor her progress and start her on DVT   prophylaxis. We will continue a discussion with the family regarding   further treatment options. She was stable throughout this procedure. The procedure for her proximal femur and hypertrophic nonunion of the   hip will be a more invasive procedure. Once she heals this fracture,   and if the family wishes to pursue this treatment, then we will refer her   to an orthopedic trauma surgeon. However, they do continue with   concerns regarding her overall medical status and ability to undergo a   significant procedure such as that.          DO LUIS E Burnett   D:  07/27/2017   21:03   T:  07/28/2017   07:28   Job #:  [id number]

## 2017-07-28 NOTE — PROGRESS NOTES
Speech Pathology bedside swallow evaluation/discharge  Patient: Lacey Mix (80 y.o. female)  Date: 2017  Primary Diagnosis: Femur fracture (HCC)  Fracture left femur  Procedure(s) (LRB):  FEMUR INTRA MEDULLARY NAIL LEFT (Left) 1 Day Post-Op   Precautions: fall       ASSESSMENT :  Based on the objective data described below, the patient presents with mild oral dysphagia characterized by prolonged oral manipulation. Functional clearance when given sufficient time. Suspected functional pharyngeal swallow. Increased aspiration risk given confusion, though more likely that intake will be an issue. She thought I was giving her my food and continuously asked, Kathy Alvarez you sure? \" prior to refusing further intake. Skilled therapy provided by a speech-language pathologist is not indicated at this time. PLAN :  Recommendations:  Continue mechanical soft, thin liquids  Nothing further for SLP to add  Discharge Recommendations: None     SUBJECTIVE:   Patient stated Oh c'est livier re: the food. OBJECTIVE:     Past Medical History:   Diagnosis Date    Alzheimer's dementia     Diagnosed while in rehab recovering from colon resection.  Chronic pain     lower back    History of colonic polyps     Tubular adenoma excised colonoscopically on 2016.  Hypertension     Lipoma of colon     Resected on 2016. Past Surgical History:   Procedure Laterality Date    HX  SECTION      HX COLONOSCOPY  2016    Dr. Yuri Jioner.  HX OTHER SURGICAL  2016    Reduction of intussusception, sigmoid colon resection, and creation of end colostomy and Osito's pouch to treat colonic obstruction secondary to colorectal intussusception of a sigmoid colon lipoma; Dr. Yuri Joiner.      Prior Level of Function/Home Situation:    Home Situation  Home Environment: Private residence  One/Two Story Residence: One story  Living Alone: No  Support Systems: Family member(s)  Patient Expects to be Discharged to[de-identified] Unknown  Current DME Used/Available at Home: None  Diet prior to admission: unknown, patient is unable to provide history  Current Diet:  Mechanical soft   Cognitive and Communication Status:  Neurologic State: Alert  Orientation Level: Disoriented to person  Cognition: No command following, Memory loss           Oral Assessment:  Oral Assessment  Labial: No impairment  Oral Hygiene: dry  P.O. Trials:  Patient Position: upright in bed  Vocal quality prior to P.O.: No impairment  Consistency Presented: Thin liquid;Mechanical soft  How Presented: Self-fed/presented;Spoon;Straw (spoon handed to patietn)     Bolus Acceptance: No impairment  Bolus Formation/Control: Impaired  Type of Impairment:  (prolonged)  Propulsion: Delayed (# of seconds)  Oral Residue: None  Initiation of Swallow: No impairment  Laryngeal Elevation: Functional  Aspiration Signs/Symptoms: None                Oral Phase Severity: Mild  Pharyngeal Phase Severity : No impairment  NOMS:   The NOMS functional outcome measure was used to quantify this patient's level of swallowing impairment. Based on the NOMS, the patient was determined to be at level 5 for swallow function     G Codes: In compliance with CMSs Claims Based Outcome Reporting, the following G-code set was chosen for this patient based the use of the NOMS functional outcome to quantify this patient's level of swallowing impairment. Using the NOMS, the patient was determined to be at level 5 for swallow function which correlates with the CJ= 20-39% level of severity.     Based on the objective assessment provided within this note, the current, goal, and discharge g-codes are as follows:    Swallow  Swallowing:   Swallow Current Status CJ= 20-39%   Swallow Goal Status CJ= 20-39%   Swallow D/C Status CJ= 20-39%        NOMS Swallowing Levels:  Level 1 (CN): NPO  Level 2 (CM): NPO but takes consistency in therapy  Level 3 (CL): Takes less than 50% of nutrition p.o. and continues with nonoral feedings; and/or safe with mod cues; and/or max diet restriction  Level 4 (CK): Safe swallow but needs mod cues; and/or mod diet restriction; and/or still requires some nonoral feeding/supplements  Level 5 (CJ): Safe swallow with min diet restriction; and/or needs min cues  Level 6 (CI): Independent with p.o.; rare cues; usually self cues; may need to avoid some foods or needs extra time  Level 7 ([address] [place] [address]): Independent for all p.o.  JASEN. (2003). National Outcomes Measurement System (NOMS): Adult Speech-Language Pathology User's Guide. Pain:         After treatment:   [] Patient left in no apparent distress sitting up in chair  [] Patient left in no apparent distress in bed  [x] Call bell left within reach  [x] Nursing notified  [] Caregiver present  [] Bed alarm activated    COMMUNICATION/EDUCATION:   The patients plan of care including findings, recommendations, and recommended diet changes were discussed with: Registered Nurse. Patient was educated regarding purpose of SLP visit. She did not seem to understand. [] Posted safety precautions in patient's room. [] Patient/family have participated as able and agree with findings and recommendations. [x] Patient is unable to participate in plan of care at this time.     Thank you for this referral.  Michelle Brown, ALEJANDRO  Time Calculation: 20 mins

## 2017-07-28 NOTE — CONSULTS
Palliative Medicine Consult  Kenn: [phone] ([phone])    Patient Name: Bora Law  YOB: 1935    Date of Initial Consult: 7/28/2017  Reason for Consult: Care  decisions  Requesting Provider: Dr. Teresa Otero  Primary Care Physician: Carlos Xiao MD      SUMMARY:   Bora Law is a 80 y.o. with a past history of alzheimer's dementia, chronic back pain, colon polyps, lipoma of colon, left leg DVT 4/2017 4/2017 after left hip fracture s/p IVC filter,  who was admitted on 7/26/2017 from home with a diagnosis of left femur fracture after falling out of bed. She has been living with family. Niece and nephew primary caregivers. They tell us that she has been bedridden since fracture in April. She went to rehab at [facility] and did not participate in physical therapy. Current medical issues leading to Palliative Medicine involvement include: left femur pain, weakness, debility, hyponatremia, FTT, hypovolemia, UTI and hypoalbuminemia. Psychosocial: Lives with niece and nephew Aimee Hurtado and Yanet Ball. In April had left hip fracture and had a left leg DVT. Has been bed bound since. PALLIATIVE DIAGNOSES:   1. Debility  2. Alzheimer's Dementia  3. Recent hip fracture in April now presents with left femur fracture  4. Bed ridden  5. Weakness  6. FTT       PLAN:   1. Patient confused and unable to participate in conversation. Called [facility]K and care taker- Yanet Ball- [phone]. Asked if we could meet to discuss [address]. She tells me she does not have a car to get to hospital today. She agreed to meet on Monday at 2 PM to discuss [address]. She shared concerns that patient is \"old school\" and would not tell us if she was in pain. That she never asks for pain mediation even when she was suffering. James asked me to schedule pain medications. I explained that this is difficult as we don't want to oversedate her which may inadvertently affect her breathing.  She really wanted her to be at least offered something. She is receiving RTC Tylenol. Ordered very low does oxycodone 2.5 mg every 12 hours. Stressed in order to hold if she is drowsy or sedated. She seemed content with that. Niece was most concerned about anticoagulation as patient had a DVT last time she broke her hip. I explained she was on medication for this and most likely would continue she is bedouin. I told her we would discuss further on Monday. 2. Spoke to niece about about Code Status. She confirmed that she would not want to be resuscitated. Will need DDNR. 3. I spoke to patient's nurse Rich Walker RN and explained why I started her on low dose opioids. 4. Patient was pleasantly confused. She spoke to us in [facility]. Difficult to understand. She was pulling off her clothes. We gave her a busy muff and she was very appreciative. Pat. 5. She did not seem to be in any pain. SCD on right leg. Right leg bandaged. 6. Initial consult note routed to primary continuity provider  7. Communicated plan of care with: Palliative IDT       GOALS OF CARE / TREATMENT PREFERENCES:   [====Goals of Care====]  GOALS OF CARE:  Patient / health care proxy stated goals:  To be determined after we meet with family      TREATMENT PREFERENCES:   Code Status: DNR    Advance Care Planning:  Advance Care Planning 7/28/2017   Patient's Healthcare Decision Maker is: Legal Next of [address] 71   Primary Decision Maker Name [id number] [facility]/Linsey Wawa- niece and nephew   Primary Decision Maker Phone Number -   Primary Decision Maker Relationship to Patient Other relative   Confirm Advance Directive None   Patient Would Like to Complete Advance Directive Unable   Does the patient have other document types -       Other:    The palliative care team has discussed with patient / health care proxy about goals of care / treatment preferences for patient.  [====Goals of Care====]         HISTORY:     History obtained from: chart and niece over the phone    Mayank Dominguez confused unable to articulate complaints as she was speaking [place]. Could ascertain that she had pain possibly earlier today but now better. HPI/SUBJECTIVE:    The patient is:   [] Verbal and participatory  [] Non-participatory due to: alzheimer's dementia     Clinical Pain Assessment (nonverbal scale for severity on nonverbal patients):    Adult Non-Verbal Pain Assessment    Face  [x] 0   No particular expression or smile  [] 1   Occasional grimace, tearing, frowning, wrinkled forehead  [] 2   Frequent grimace, tearing, frowning, wrinkled forehead    Activity (movement)  [x] 0   Lying quietly, normal position  [] 1   Seeking attention through movement or slow, cautious movement  [] 2   Restless, excessive activity and/or withdrawal reflexes    Guarding  [x] 0   Lying quietly, no positioning of hands over areas of body  [] 1   Splinting areas of the body, tense  [] 2   Rigid, stiff    Physiology (vital signs)  [x] 0   Stable vital signs  [] 1   Change in any of the following: SBP > 20mm Hg; HR > 20/minute  [] 2   Change in any of the following: SBP > 30mm Hg; HR > 25/minute    Respiratory  [] 0   Baseline RR/SpO2, compliant with ventilator  [] 1   RR > 10 above baseline, or 5% drop SpO2, mild asynchrony with ventilator  [] 2   RR > 20 above baseline, or 10% drop SpO2, asynchrony with ventilator    Total Non-Verbal Pain Score: 0     FUNCTIONAL ASSESSMENT:     Palliative Performance Scale (PPS):  PPS: 40       PSYCHOSOCIAL/SPIRITUAL SCREENING:     Advance Care Planning:  Advance Care Planning 7/28/2017   Patient's Healthcare Decision Maker is: Legal Next of Kin   Primary Decision Maker Name KAMI CASTILLO/Linsey Castillo- niece and nephew   Primary Decision Maker Phone Number -   Primary Decision Maker Relationship to Patient Other relative   Confirm Advance Directive None   Patient Would Like to Complete Advance Directive Unable   Does the patient have other document types -        Any spiritual / Latter day concerns:  [] Yes /  [x] No    Caregiver Burnout:  [] Yes /  [] No /  [x] No Caregiver Present      Anticipatory grief assessment:   [x] Normal  / [] Maladaptive       ESAS Anxiety: Anxiety: 0    ESAS Depression: Depression: 0        REVIEW OF SYSTEMS:     Positive and pertinent negative findings in ROS are noted above in HPI. The following systems were [x] reviewed / [] unable to be reviewed as noted in HPI  Other findings are noted below. Systems: constitutional, ears/nose/mouth/throat, respiratory, gastrointestinal, genitourinary, musculoskeletal, integumentary, neurologic, psychiatric, endocrine. Positive findings noted below. Modified ESAS Completed by: provider   Fatigue: 1 Drowsiness: 0   Depression: 0 Pain:  (non verbal score=0)   Anxiety: 0 Nausea: 0   Anorexia: 4 Dyspnea: 0     Constipation: No              PHYSICAL EXAM:     From RN flowsheet:  Wt Readings from Last 3 Encounters:   07/27/17 90 lb (40.8 kg)   04/21/17 103 lb 13.4 oz (47.1 kg)   01/19/17 101 lb (45.8 kg)     Blood pressure (!) 129/97, pulse 93, temperature 98.1 °F (36.7 °C), resp. rate 18, height 5' 4\" (1.626 m), weight 90 lb (40.8 kg), SpO2 95 %.     Pain Scale 1: Numeric (0 - 10)  Pain Intensity 1: 0     Pain Location 1: Leg  Pain Orientation 1: Left  Pain Description 1: Aching  Pain Intervention(s) 1: Medication (see MAR)  Last bowel movement, if known:     Constitutional: Elderly, frail, pleasantly confused woman lying in bed in NAD  Eyes: pupils equal, anicteric  ENMT: no nasal discharge, moist mucous membranes  Cardiovascular: regular rhythm, distal pulses intact  Respiratory: breathing not labored, symmetric  Gastrointestinal: soft non-tender, +bowel sounds  Musculoskeletal: no deformity, no tenderness to palpation, left leg is bandaged, right leg in SCDs  Skin: warm, dry  Neurologic: following somecommands, moving all extremities  Psychiatric: full affect, no hallucinations, speaking in [place] mostly, not sure if she understands what we are saying to her  Other:       HISTORY:     Principal Problem:    Femur fracture ([address] (2017)      Past Medical History:   Diagnosis Date    Alzheimer's dementia     Diagnosed while in rehab recovering from colon resection.  Chronic pain     lower back    History of colonic polyps     Tubular adenoma excised colonoscopically on 2016.  Hypertension     Lipoma of colon     Resected on 2016. Past Surgical History:   Procedure Laterality Date    HX  SECTION      HX COLONOSCOPY  2016    Dr. Michael Poe.  HX OTHER SURGICAL  2016    Reduction of intussusception, sigmoid colon resection, and creation of end colostomy and Osito's pouch to treat colonic obstruction secondary to colorectal intussusception of a sigmoid colon lipoma; Dr. Michael Poe. Family History   Problem Relation Age of Onset    Cancer Sister      pancreatic    Anesth Problems Neg Hx       History reviewed, no pertinent family history.   Social History   Substance Use Topics    Smoking status: Never Smoker    Smokeless tobacco: Never Used    Alcohol use No     No Known Allergies   Current Facility-Administered Medications   Medication Dose Route Frequency    famotidine (PEPCID) tablet 20 mg  20 mg Oral BID    oxyCODONE IR (ROXICODONE) tablet 2.5 mg  2.5 mg Oral Q12H    sodium chloride (NS) flush 5-10 mL  5-10 mL IntraVENous Q8H    sodium chloride (NS) flush 5-10 mL  5-10 mL IntraVENous PRN    naloxone (NARCAN) injection 0.4 mg  0.4 mg IntraVENous PRN    calcium-vitamin D (OS-SERINA) 500 mg-200 unit tablet  1 Tab Oral TID WITH MEALS    senna-docusate (PERICOLACE) 8.6-50 mg per tablet 1 Tab  1 Tab Oral BID    polyethylene glycol (MIRALAX) packet 17 g  17 g Oral DAILY    [START ON 2017] bisacodyl (DULCOLAX) suppository 10 mg  10 mg Rectal DAILY PRN    enoxaparin ++ partial dose++ 20 mg  20 mg SubCUTAneous DAILY    sodium chloride (NS) flush 5-10 mL  5-10 mL IntraVENous PRN    cefTRIAXone (ROCEPHIN) 1 g in 0.9% sodium chloride (MBP/ADV) 50 mL  1 g IntraVENous Q24H    dextrose 5 % - 0.45% NaCl infusion  100 mL/hr IntraVENous PRN    donepezil (ARICEPT) tablet 10 mg  10 mg Oral QHS    sodium chloride (NS) flush 5-10 mL  5-10 mL IntraVENous PRN    acetaminophen (TYLENOL) tablet 650 mg  650 mg Oral Q6H    ondansetron (ZOFRAN) injection 4 mg  4 mg IntraVENous Q4H PRN    docusate sodium (COLACE) capsule 100 mg  100 mg Oral BID    0.9% sodium chloride infusion  75 mL/hr IntraVENous CONTINUOUS    sodium chloride (NS) flush 5-10 mL  5-10 mL IntraVENous Q8H    sodium chloride (NS) flush 5-10 mL  5-10 mL IntraVENous PRN    oxyCODONE IR (ROXICODONE) tablet 2.5 mg  2.5 mg Oral Q4H PRN    oxyCODONE IR (ROXICODONE) tablet 5 mg  5 mg Oral Q4H PRN    naloxone (NARCAN) injection 0.4 mg  0.4 mg IntraVENous PRN    senna-docusate (PERICOLACE) 8.6-50 mg per tablet 2 Tab  2 Tab Oral BID          LAB AND IMAGING FINDINGS:     Lab Results   Component Value Date/Time    WBC 8.9 07/28/2017 04:14 AM    HGB 10.5 07/28/2017 04:14 AM    PLATELET 714 00/94/9929 04:14 AM     Lab Results   Component Value Date/Time    Sodium 143 07/28/2017 04:14 AM    Potassium 3.5 07/28/2017 04:14 AM    Chloride 110 07/28/2017 04:14 AM    CO2 27 07/28/2017 04:14 AM    BUN 17 07/28/2017 04:14 AM    Creatinine 0.36 07/28/2017 04:14 AM    Calcium 8.1 07/28/2017 04:14 AM    Magnesium 2.2 07/27/2017 03:59 AM    Phosphorus 2.4 07/27/2017 03:59 AM      Lab Results   Component Value Date/Time    AST (SGOT) 26 07/26/2017 03:12 PM    Alk.  phosphatase 111 07/26/2017 03:12 PM    Protein, total 7.1 07/26/2017 03:12 PM    Albumin 3.2 07/26/2017 03:12 PM    Globulin 3.9 07/26/2017 03:12 PM     Lab Results   Component Value Date/Time    INR 1.0 07/26/2017 04:22 PM    Prothrombin time 10.2 07/26/2017 04:22 PM    aPTT 26.0 01/05/2016 03:07 AM      Lab Results   Component Value Date/Time    Iron 34 04/18/2017 06:06 AM    Iron 35 04/18/2017 06:06 AM    TIBC 205 04/18/2017 06:06 AM    Iron % saturation 17 04/18/2017 06:06 AM    Ferritin 178 04/18/2017 06:06 AM      No results found for: PH, PCO2, PO2  No components found for: [place]   Lab Results   Component Value Date/Time     04/18/2017 06:06 AM    CK - MB 4.0 04/18/2017 06:06 AM                Total time: 50 min  Counseling / coordination time, spent as noted above: 35 min  > 50% counseling / coordination?: yes    Prolonged service was provided for  []30 min   []75 min in face to face time in the presence of the patient, spent as noted above. Time Start:   Time End:   Note: this can only be billed with 61277 (initial) or 60361 (follow up). If multiple start / stop times, list each separately.

## 2017-07-28 NOTE — PROGRESS NOTES
Brief Nutrition Note    Chart reviewed. Nutrition consulted for dietary supplements s/p Retrograde intramedullary nailing, left femur. SLP unable to conduct eval today, pt remains NPO. Ensure to be add TID once diet is advanced. Will follow up once diet is advanced.      Mitzy Durbin RDN  Pager: [phone]

## 2017-07-29 NOTE — PROGRESS NOTES
Hospitalist Progress Note    NAME: Sophie Meyers   :  1935   MRN:  [id number]       Assessment / Plan:  Acute fracture of the left mid shaft femur  Healing left hip fracture  -s/p L retrograde intramedullary nailing  -pain and DVT prophylaxis as per ortho team, has not required any narcotics overnight. Pain appears to be well controlled with just tylenol  -bowel regimen added (miralax, dulcolax)  -monitor closely for delirium, would limit narcotic use  -PT/OT/CM, pt is NWB LLE, has been bed bound for months as per family    Aspiration risk  -consult SLP (can be done after surgery)  -aspiration precaution    Hypernatremia, resolved  -due to dehydration with poor po intake  -cont' IVF for now  -monitor lab    Hypokalemia  -repleted, monitor    UTI  -urine cx showed gram neg mikie  -cont' empiric rocephin     Alzheimer dementia  -high risk for delirium,   -limit narcotic use if possible/avoid benzos  -cont' aricept  - palliative care team to help with care decisions, meeting with rakesh monday     Left LE DVT 2017   -S/p IVC  -not on anticoagulation due to high risk of falls      Severe protein calory malnutrition  -albumin 1.9   -nutritional consult   -added ensure         Code Status: DNR   Surrogate Decision Maker: son      DVT Prophylaxis: will be per ortho per protocol   GI Prophylaxis: not indicated     Baseline:lives with family; bed bound since April s/p fall; dementia; lives with son at home      Subjective:     Chief Complaint / Reason for Physician Visit  Pt is in bed, awake. No acute complaints. Discussed with RN events overnight.      Review of Systems:  Symptom Y/N Comments  Symptom Y/N Comments   Fever/Chills    Chest Pain     Poor Appetite    Edema     Cough    Abdominal Pain     Sputum    Joint Pain     SOB/JOE    Pruritis/Rash     Nausea/vomit    Tolerating PT/OT     Diarrhea    Tolerating Diet     Constipation    Other       Could NOT obtain due to: dementia Objective:     VITALS:   Last 24hrs VS reviewed since prior progress note. Most recent are:  Patient Vitals for the past 24 hrs:   Temp Pulse Resp BP SpO2   07/29/17 0759 98.2 °F (36.8 °C) 76 18 144/78 98 %   07/29/17 0457 97.7 °F (36.5 °C) 89 18 131/65 100 %   07/29/17 0014 97.5 °F (36.4 °C) 92 18 134/68 100 %   07/28/17 1909 99.4 °F (37.4 °C) 94 18 138/74 100 %   07/28/17 1544 98.1 °F (36.7 °C) 93 18 (!) 129/97 95 %   07/28/17 1143 98 °F (36.7 °C) 77 18 130/66 96 %       Intake/Output Summary (Last 24 hours) at 07/29/17 1034  Last data filed at 07/29/17 0829   Gross per 24 hour   Intake              100 ml   Output                0 ml   Net              100 ml        PHYSICAL EXAM:  General: Thin female, cooperative, no acute distress    EENT:  EOMI. Anicteric sclerae. MMM  Resp:  CTA bilaterally, no wheezing or rales. No accessory muscle use  CV:  Regular  rhythm,  No edema  GI:  Soft, Non distended, Non tender.  +Bowel sounds  Neurologic:  Alert and oriented X 1, normal speech  Psych:   Unable to do but does not appear to be anxious or agitated  Skin:  No rashes. No jaundice    Reviewed most current lab test results and cultures  YES  Reviewed most current radiology test results   YES  Review and summation of old records today    NO  Reviewed patient's current orders and MAR    YES  PMH/ reviewed - no change compared to H&P  ________________________________________________________________________  Care Plan discussed with:    Comments   Patient x    Family      RN x    Care Manager     Consultant                        Multidiciplinary team rounds were held today with , nursing, pharmacist and clinical coordinator. Patient's plan of care was discussed; medications were reviewed and discharge planning was addressed.      ________________________________________________________________________  Total NON critical care TIME:  35   Minutes    Total CRITICAL CARE TIME Spent:   Minutes non procedure based      Comments   >50% of visit spent in counseling and coordination of care     ________________________________________________________________________  Iván Prado MD     Procedures: see electronic medical records for all procedures/Xrays and details which were not copied into this note but were reviewed prior to creation of Plan. LABS:  I reviewed today's most current labs and imaging studies.   Pertinent labs include:  Recent Labs      07/29/17 0457 07/28/17 0414 07/27/17   0359   WBC  8.7  8.9  11.2*   HGB  9.6*  10.5*  12.3   HCT  28.9*  32.8*  39.3   PLT  143*  155  170     Recent Labs      07/29/17 0457 07/28/17   0414  07/27/17 0359  07/26/17   1622  07/26/17   1512   NA  141  143  144   --   148*   K  3.9  3.5  3.7   --   2.9*   CL  108  110*  111*   --   112*   CO2  28  27  28   --   32   GLU  96  74  91   --   117*   BUN  14  17  25*   --   36*   CREA  0.32*  0.36*  0.37*   --   0.58   CA  8.8  8.1*  8.3*   --   8.8   MG   --    --   2.2   --    --    PHOS   --    --   2.4*   --    --    ALB   --    --    --    --   3.2*   TBILI   --    --    --    --   0.6   SGOT   --    --    --    --   26   ALT   --    --    --    --   30   INR   --    --    --   1.0   --        Signed: Iván Prado MD

## 2017-07-29 NOTE — PROGRESS NOTES
Orthopedic NP Progress Note  Post Op day: 2 Days Post-Op    July 29, 2017 [address]    Attending Physician: Treatment Team: Attending Provider: Mayra Abarca MD; Consulting Provider: Dexter Escobar PA-C; Consulting Provider: Shelli Hardwick MD; Consulting Provider: Lissette Barker MD; Consulting Provider: Adalid Aguilar MD; Physician: Ching Pennington DO; Utilization Review: Sarah Carrasco     Vital Signs:    Patient Vitals for the past 8 hrs:   BP Temp Pulse Resp SpO2   07/29/17 0759 144/78 98.2 °F (36.8 °C) 76 18 98 %   07/29/17 0457 131/65 97.7 °F (36.5 °C) 89 18 100 %     BMI (calculated): 15.4 (07/27/17 1606)    Intake/Output:     07/27 1901 - 07/29 0700  In: 263.8 [P.O.:120; I.V.:143.8]  Out: -     Pain Control:   Pain Assessment  Pain Scale 1: Numeric (0 - 10)  Pain Intensity 1: 0  Pain Location 1: Leg  Pain Orientation 1: Left  Pain Description 1: Aching  Pain Intervention(s) 1: Medication (see MAR)    LAB:    Recent Labs      07/29/17 0457   HCT  28.9*   HGB  9.6*     Lab Results   Component Value Date/Time    Sodium 141 07/29/2017 04:57 AM    Potassium 3.9 07/29/2017 04:57 AM    Chloride 108 07/29/2017 04:57 AM    CO2 28 07/29/2017 04:57 AM    Glucose 96 07/29/2017 04:57 AM    BUN 14 07/29/2017 04:57 AM    Creatinine 0.32 07/29/2017 04:57 AM    Calcium 8.8 07/29/2017 04:57 AM       Subjective: Princess Bates is a 80 y.o. female s/p a  Procedure(s) with comments:  FEMUR INTRA MEDULLARY NAIL LEFT - FEMUR INTRA MEDULLARY NAILING [address] [place] [address] synthes   Procedure(s): FEMUR INTRA MEDULLARY NAIL LEFT. Tolerating diet. Resting in bed      Objective: General: alert, cooperative, no distress. Cardiac: Normal S1&S2, no murmur. Resp: BBS clear, no wheezing. Gastrointestinal:  Soft, non-tender. Neuro/Vascular: CNS Intact. Sensation stable. Brisk cap refill, 2+ pulses UE/LE  Musculoskeletal:  +ROM UE/LE with splint to LLE. Callie's sign negative in bilateral lower extremities. Calves soft, supple, non-tender upon palpation or with passive stretch. Skin: warm and dry   Dressing: clean, dry, and intact    Don - n  Drain - n       PT/OT:   Gait:                      Assessment:    s/p Procedure(s): FEMUR INTRA MEDULLARY NAIL LEFT    Principal Problem:    Femur fracture ([address] (7/26/2017)         Plan:     -  Continue PT/OT - NWB LLE, pt is bed bond   -  Continue established methods of pain control  -  VTE Prophylaxes - TEDS &/or SCDs with lovenox   -  pepcid for GI Prophylaxes        Discharge To:  SNF when accepted     Dr. Omayra Hahn aware and agree with plan.      Signed By: Efrain Broderick NP    Orthopedic Nurse Practitioner

## 2017-07-29 NOTE — PROGRESS NOTES
Bedside shift change report given to Myriam (oncoming nurse) by Sathish Mckeon (offgoing nurse). Report included the following information SBAR, OR Summary, Procedure Summary, Intake/Output, MAR, Accordion and Recent Results.

## 2017-07-30 NOTE — PROGRESS NOTES
Bedside shift change report given to Bronwyn (oncoming nurse) by Brittney Leone (offgoing nurse). Report included the following information SBAR, Procedure Summary, MAR, Accordion and Recent Results.

## 2017-07-30 NOTE — PROGRESS NOTES
Orthopedic NP Progress Note  Post Op day: 3 Days Post-Op    July 30, 2017 10:36 AM     Awilda Vasquez    Attending Physician: Treatment Team: Attending Provider: Everardo Grissom MD; Consulting Provider: Alma Michelle PA-C; Consulting Provider: Dana Johnson MD; Consulting Provider: Luke Kimble MD; Consulting Provider: Hipolito Umanzor MD; Physician: Saima Fortune DO; Utilization Review: Rainell Denver     Vital Signs:    Patient Vitals for the past 8 hrs:   BP Temp Pulse Resp SpO2   07/30/17 0900 105/64 97 °F (36.1 °C) 92 16 96 %     BMI (calculated): 15.4 (07/27/17 1606)    Intake/Output:     07/28 1901 - 07/30 0700  In: 100 [P.O.:100]  Out: -     Pain Control:   Pain Assessment  Pain Scale 1: Numeric (0 - 10)  Pain Intensity 1: 0  Pain Location 1: Leg  Pain Orientation 1: Left  Pain Description 1: Aching  Pain Intervention(s) 1: Medication (see MAR)    LAB:    Recent Labs      07/29/17   0457   HCT  28.9*   HGB  9.6*     Lab Results   Component Value Date/Time    Sodium 141 07/29/2017 04:57 AM    Potassium 3.9 07/29/2017 04:57 AM    Chloride 108 07/29/2017 04:57 AM    CO2 28 07/29/2017 04:57 AM    Glucose 96 07/29/2017 04:57 AM    BUN 14 07/29/2017 04:57 AM    Creatinine 0.32 07/29/2017 04:57 AM    Calcium 8.8 07/29/2017 04:57 AM       Subjective: Awilda Vasquez is a 80 y.o. female s/p a  Procedure(s) with comments:  FEMUR INTRA MEDULLARY NAIL LEFT - FEMUR INTRA MEDULLARY NAILING [address] [place] [address]   Procedure(s): FEMUR INTRA MEDULLARY NAIL LEFT. Tolerating diet. Resting in bed quietly     Objective: General: awakens , cooperative, no distress. Neuro/Vascular: CNS Intact. Sensation stable. Brisk cap refill, + pulses UE/LE  Musculoskeletal:  +ROM UE/LE. Callie's sign negative in bilateral lower extremities. Calves soft, supple, non-tender upon palpation or with passive stretch. Skin: Incision - clean, dry and intact. No significant erythema or swelling.     Dressing: min bloody drainage Don - n  Drain - n       PT/OT:   Gait:                      Assessment:    s/p Procedure(s): FEMUR INTRA MEDULLARY NAIL LEFT    Principal Problem:    Femur fracture ([address] (7/26/2017)         Plan:     -  Continue PT/OT - NWB LLE, pt is bed bond   -  Continue established methods of pain control  -  VTE Prophylaxes - TEDS &/or SCDs with lovenox   -  pepcid for GI Prophylaxes         Discharge To:  SNF when accepted, has plan for meeting with palliative care Monday      Dr. Gilberto Chacon aware and agree with plan.      Signed By: Patrick Marquez NP    Orthopedic Nurse Practitioner

## 2017-07-30 NOTE — PROGRESS NOTES
Hospitalist Progress Note    NAME: Bev Travis   :  1935   MRN:  [id number]       Assessment / Plan:  Acute fracture of the left mid shaft femur  Healing left hip fracture  -s/p L retrograde intramedullary nailing  -pain and DVT prophylaxis as per ortho team, has not required any narcotics overnight. Pain appears to be well controlled with just tylenol  -bowel regimen added (miralax, dulcolax)  -monitor closely for delirium, would limit narcotic use  -PT/OT/CM, pt is NWB LLE, has been bed bound for months as per family    Aspiration risk  -consult SLP (can be done after surgery)  -aspiration precaution    Hypernatremia, resolved  -due to dehydration with poor po intake  -cont' IVF for now  -monitor lab    Hypokalemia  -repleted, monitor    UTI  -urine cx showed gram neg mikie  -cont' empiric rocephin     Alzheimer dementia  -high risk for delirium,   -limit narcotic use if possible/avoid benzos  -cont' aricept  - palliative care team to help with care decisions, meeting with rakesh monday     Left LE DVT 2017   -S/p IVC  -not on anticoagulation due to high risk of falls      Severe protein calory malnutrition  -albumin 1.9   -nutritional consult   -added ensure         Code Status: DNR   Surrogate Decision Maker: son      DVT Prophylaxis: will be per ortho per protocol   GI Prophylaxis: not indicated     Baseline:lives with family; bed bound since April s/p fall; dementia; lives with son at home      Subjective:     Chief Complaint / Reason for Physician Visit  Pt is in bed, awake. No acute complaints. Discussed with RN events overnight.      Review of Systems:  Symptom Y/N Comments  Symptom Y/N Comments   Fever/Chills    Chest Pain     Poor Appetite    Edema     Cough    Abdominal Pain     Sputum    Joint Pain     SOB/JOE    Pruritis/Rash     Nausea/vomit    Tolerating PT/OT     Diarrhea    Tolerating Diet     Constipation    Other       Could NOT obtain due to: dementia Objective:     VITALS:   Last 24hrs VS reviewed since prior progress note. Most recent are:  Patient Vitals for the past 24 hrs:   Temp Pulse Resp BP SpO2   07/30/17 0900 97 °F (36.1 °C) 92 16 105/64 96 %   07/29/17 2337 98.3 °F (36.8 °C) 96 18 117/72 100 %   07/29/17 2000 98.2 °F (36.8 °C) 98 18 122/60 100 %   07/29/17 1615 97.8 °F (36.6 °C) 94 18 134/65 100 %   07/29/17 1226 97.2 °F (36.2 °C) 89 18 125/69 98 %     No intake or output data in the 24 hours ending 07/30/17 1124     PHYSICAL EXAM:  General: Thin female, cooperative, no acute distress    EENT:  EOMI. Anicteric sclerae. MMM  Resp:  CTA bilaterally, no wheezing or rales. No accessory muscle use  CV:  Regular  rhythm,  No edema  GI:  Soft, Non distended, Non tender.  +Bowel sounds  Neurologic:  Alert and oriented X 1, normal speech  Psych:   Unable to do but does not appear to be anxious or agitated  Skin:  No rashes. No jaundice    Reviewed most current lab test results and cultures  YES  Reviewed most current radiology test results   YES  Review and summation of old records today    NO  Reviewed patient's current orders and MAR    YES  PMH/ reviewed - no change compared to H&P  ________________________________________________________________________  Care Plan discussed with:    Comments   Patient x    Family      RN x    Care Manager     Consultant                        Multidiciplinary team rounds were held today with , nursing, pharmacist and clinical coordinator. Patient's plan of care was discussed; medications were reviewed and discharge planning was addressed.      ________________________________________________________________________  Total NON critical care TIME:  35   Minutes    Total CRITICAL CARE TIME Spent:   Minutes non procedure based      Comments   >50% of visit spent in counseling and coordination of care     ________________________________________________________________________  Jl Heredia MD     Procedures: see electronic medical records for all procedures/Xrays and details which were not copied into this note but were reviewed prior to creation of Plan. LABS:  I reviewed today's most current labs and imaging studies.   Pertinent labs include:  Recent Labs      07/29/17 0457 07/28/17 0414   WBC  8.7  8.9   HGB  9.6*  10.5*   HCT  28.9*  32.8*   PLT  143*  155     Recent Labs      07/29/17 0457 07/28/17 0414   NA  141  143   K  3.9  3.5   CL  108  110*   CO2  28  27   GLU  96  74   BUN  14  17   CREA  0.32*  0.36*   CA  8.8  8.1*       Signed: Bala Talamantes MD

## 2017-07-30 NOTE — PROGRESS NOTES
Orthopedic End of Shift Note    Bedside shift change report given to Radha Barker (oncoming nurse) by Jamal Barker (offgoing nurse). Report included the following information SBAR, Kardex, OR Summary, Intake/Output, MAR and Recent Results. POD# 2    Significant issues during shift: none at this time    Issues for Physician to address none at this time    Nausea/Vomiting [] yes [x] no     Don Catheter [] in [] removed    Bowel Movements [x] yes [] no     Foot Pumps or SCD [x] yes [] no    Ice Pack [x] yes    [] no    Incentive Spirometer [] yes [x] no Volume:  unable   Supplemental O2 [] yes [x] no Sat off O2:  98%     Patient Vitals for the past 12 hrs:   Temp Pulse Resp BP SpO2   07/30/17 0900 97 °F (36.1 °C) 92 16 105/64 96 %     Lab Results   Component Value Date/Time    HGB 9.6 07/29/2017 04:57 AM    HCT 28.9 07/29/2017 04:57 AM     Lab Results   Component Value Date/Time    INR 1.0 07/26/2017 04:22 PM    INR 1.0 01/05/2016 03:07 AM     No intake or output data in the 24 hours ending 07/30/17 1909  Wound Sacral/coccyx Mid (Active)   Number of days:103       Wound Buttocks Right (Active)   Number of days:103       Wound Leg Left (Active)   DRESSING STATUS Clean, dry, and intact 7/29/2017  8:00 PM   DRESSING TYPE 4 x 4;Cast padding;Elastic bandage;Staples;Special tape (comment) 7/29/2017  8:00 PM   Drainage Amount  Moderate 7/28/2017  1:53 AM   Drainage Color Sanguinous 7/28/2017  1:53 AM   Wound Odor None 7/28/2017  1:53 AM   Number of days:3            Peripheral Intravenous Line:  Peripheral IV 07/30/17 Right Antecubital (Active)   Site Assessment Clean, dry, & intact 7/30/2017  6:33 AM   Phlebitis Assessment 0 7/30/2017  6:33 AM   Infiltration Assessment 0 7/30/2017  6:33 AM   Dressing Status Clean, dry, & intact 7/30/2017  6:33 AM   Dressing Type Tape;Transparent 7/30/2017  6:33 AM   Hub Color/Line Status Blue;Capped; Patent 7/30/2017  6:33 AM     Drain(s): none   Post op End of Shift Nursing Note    Patient tolerating advanced dysphagia diet. Patient has to be fed meals.   Drinks all of ensure each meal.        IV Flds [] in [] removed    Don Catheter [] in  [] removed     Nausea [] yes [] no     Vomiting [] yes [] no    Bowel sounds [] absent [] hypoactive [] active    Bowel Movements [] yes [] no     Dressing present [] yes [] no     Wound concerns [] yes  [] no     Drains [] yes [] no    Foot Pumps or SCD [] yes [] no    Ice Pack [] yes    [] no    Incentive Spirometer [] yes [] no    Supplemental O2 [] yes [] no

## 2017-07-30 NOTE — PROGRESS NOTES
0730- Bedside and Verbal shift change report given to Armando TORRES RN (oncoming nurse) by Patt Irvin RN (offgoing nurse). Report included the following information SBAR, Kardex, ED Summary, Procedure Summary, Intake/Output, MAR, Accordion, Recent Results and Med Rec Status.

## 2017-07-31 NOTE — PROGRESS NOTES
Spoke with Mike from [address] and page sent out to the team related to the niece is in the room for a palliative care meeting about the patient.

## 2017-07-31 NOTE — CONSULTS
Palliative Medicine Consult  [place]: [phone]    Patient Name: Isa Olmos  YOB: 1935    Date of Initial Consult: 7/28/2017  Reason for Consult: Care  decisions  Requesting Provider: Dr. Baldomero Joshua  Primary Care Physician: Chelsea Pandey MD      SUMMARY:   Isa Olmos is a 80 y.o. with a past history of alzheimer's dementia, chronic back pain, colon polyps, lipoma of colon, left leg DVT 4/2017 4/2017 after left hip fracture s/p IVC filter,  who was admitted on 7/26/2017 from home with a diagnosis of left femur fracture after falling out of bed. She has been living with family. Niece and nephew primary caregivers. They tell us that she has been bedridden since fracture in April. She went to rehab at [facility] and did not participate in physical therapy. Current medical issues leading to Palliative Medicine involvement include: left femur pain, weakness, debility, hyponatremia, FTT, hypovolemia, UTI and hypoalbuminemia. Psychosocial: Lives with niece and nephew Hill Henson and Charles Pineda. In April had left hip fracture and had a left leg DVT. Has been bed bound since. PALLIATIVE DIAGNOSES:   1. Debility  2. Alzheimer's Dementia  3. Recent hip fracture in April now presents with left femur fracture  4. Bed ridden  5. Weakness  6. FTT       PLAN:   1. Patient confused and unable to participate in conversation. Had meeting with [facility] and care taker- Charles Edwin- [phone]. There was some confusion of the timing of the meeting but we were eventually able to get together. I had previously introduced myself and palliative care services to her. Kacie Francisco wants to see her Aunt work with PT. She feels that Ms. Garsia would not want to work with a stranger and would be more receptive if she was present. Asked PT to see patient today was told they were too busy. That they could come back tomorrow at 230 for them to work with her and have Kacie Singh present.     2. Tolerating low dose opioids for pain. She seems comfortable. She is still receiving RTC Tylenol. Continue with very low does oxycodone 2.5 mg every 12 hours. Stressed in order to hold if she is drowsy or sedated. She seemed content with that. Rakesh was most concerned about anticoagulation as patient had a DVT last time she broke her hip. I explained she was on medication for this and most likely would continue she is bedouin. I told her we would discuss further on Monday. 3. Spoke to rakesh about about Code Status. She confirmed that she would not want to be resuscitated. Gave rakesh DDNR paperwork to share with her  who biologically is the [facility]. Vanda Briseno said she would bring back tomorrow for us to complete. 3. Vanda Briseno tells me they have been inc contact with a hospice [facility] but can not remember the name of it. She will tell me tomorrow. 5. I spoke to patient's nurse Federico Veras RN, and Dr. Erika Avila about patient. 6. Patient is still pleasantly confused. She spoke to us in [facility]. She did not seem to be in any pain. SCD on right leg. Right leg bandaged. 7. Initial consult note routed to primary continuity provider  8. Communicated plan of care with: Palliative IDT       GOALS OF CARE / TREATMENT PREFERENCES:   [====Goals of Care====]  GOALS OF CARE:  Patient / health care proxy stated goals:  To get her back to her baseline possibly to rehab or go home with hospice      TREATMENT PREFERENCES:   Code Status: DNR    Advance Care Planning:  Advance Care Planning 7/28/2017   Patient's Healthcare Decision Maker is: Legal Next of Collin 71   Primary Decision Maker Name [id number] [facility]Linsey Smith- niece and nephew   Primary Decision Maker Phone Number -   Primary Decision Maker Relationship to Patient Other relative   Confirm Advance Directive None   Patient Would Like to Complete Advance Directive Unable   Does the patient have other document types -       Other:    The palliative care team has discussed with patient / health care proxy about goals of care / treatment preferences for patient.  [====Goals of Care====]         HISTORY:     History obtained from: chart and niece     [address] confused unable to articulate complaints as she was speaking [place]. Appears comfortable    HPI/SUBJECTIVE:    The patient is:   [] Verbal and participatory  [] Non-participatory due to: alzheimer's dementia     Clinical Pain Assessment (nonverbal scale for severity on nonverbal patients):    Adult Non-Verbal Pain Assessment    Face  [x] 0   No particular expression or smile  [] 1   Occasional grimace, tearing, frowning, wrinkled forehead  [] 2   Frequent grimace, tearing, frowning, wrinkled forehead    Activity (movement)  [x] 0   Lying quietly, normal position  [] 1   Seeking attention through movement or slow, cautious movement  [] 2   Restless, excessive activity and/or withdrawal reflexes    Guarding  [x] 0   Lying quietly, no positioning of hands over areas of body  [] 1   Splinting areas of the body, tense  [] 2   Rigid, stiff    Physiology (vital signs)  [x] 0   Stable vital signs  [] 1   Change in any of the following: SBP > 20mm Hg; HR > 20/minute  [] 2   Change in any of the following: SBP > 30mm Hg; HR > 25/minute    Respiratory  [] 0   Baseline RR/SpO2, compliant with ventilator  [] 1   RR > 10 above baseline, or 5% drop SpO2, mild asynchrony with ventilator  [] 2   RR > 20 above baseline, or 10% drop SpO2, asynchrony with ventilator    Total Non-Verbal Pain Score: 0     FUNCTIONAL ASSESSMENT:     Palliative Performance Scale (PPS):  PPS: 40       PSYCHOSOCIAL/SPIRITUAL SCREENING:     Advance Care Planning:  Advance Care Planning 7/28/2017   Patient's Healthcare Decision Maker is: Legal Next of Kin   Primary Decision Maker Name KAMI CASTILLO/Linsey Castillo- niece and nephew   Primary Decision Maker Phone Number -   Primary Decision Maker Relationship to Patient Other relative   Confirm Advance Directive None   Patient Would Like to Complete Advance Directive Unable   Does the patient have other document types -        Any spiritual / Gnosticism concerns:  [] Yes /  [x] No    Caregiver Burnout:  [] Yes /  [x] No /  [] No Caregiver Present      Anticipatory grief assessment:   [x] Normal  / [] Maladaptive       ESAS Anxiety: Anxiety: 0    ESAS Depression: Depression: 0        REVIEW OF SYSTEMS:     Positive and pertinent negative findings in ROS are noted above in HPI. The following systems were [x] reviewed / [] unable to be reviewed as noted in HPI  Other findings are noted below. Systems: constitutional, ears/nose/mouth/throat, respiratory, gastrointestinal, genitourinary, musculoskeletal, integumentary, neurologic, psychiatric, endocrine. Positive findings noted below. Modified ESAS Completed by: provider   Fatigue: 1 Drowsiness: 0   Depression: 0 Pain:  (non verbal score=0)   Anxiety: 0 Nausea: 0   Anorexia: 4 Dyspnea: 0     Constipation: No     Stool Occurrence(s): 1        PHYSICAL EXAM:     From RN flowsheet:  Wt Readings from Last 3 Encounters:   07/27/17 90 lb (40.8 kg)   04/21/17 103 lb 13.4 oz (47.1 kg)   01/19/17 101 lb (45.8 kg)     Blood pressure 135/71, pulse 84, temperature 98.3 °F (36.8 °C), resp. rate 18, height 5' 4\" (1.626 m), weight 90 lb (40.8 kg), SpO2 97 %.     Pain Scale 1: FLACC  Pain Intensity 1: 0     Pain Location 1: Leg  Pain Orientation 1: Left  Pain Description 1: Aching  Pain Intervention(s) 1: Medication (see MAR)  Last bowel movement, if known:     Constitutional: Elderly, frail, pleasantly confused woman lying in bed in NAD  Eyes: pupils equal, anicteric  ENMT: no nasal discharge, moist mucous membranes  Cardiovascular: regular rhythm, distal pulses intact  Respiratory: breathing not labored, symmetric  Gastrointestinal: soft non-tender, +bowel sounds  Musculoskeletal: no deformity, no tenderness to palpation, left leg is bandaged, right leg in SCDs  Skin: warm, dry  Neurologic: following somecommands, moving all extremities  Psychiatric: full affect, no hallucinations, speaking in [place] mostly, not sure if she understands what we are saying to her  Other:       HISTORY:     Principal Problem:    Femur fracture ([address] (2017)      Past Medical History:   Diagnosis Date    Alzheimer's dementia     Diagnosed while in rehab recovering from colon resection.  Chronic pain     lower back    History of colonic polyps     Tubular adenoma excised colonoscopically on 2016.  Hypertension     Lipoma of colon     Resected on 2016. Past Surgical History:   Procedure Laterality Date    HX  SECTION      HX COLONOSCOPY  2016    Dr. Bob Monet.  HX OTHER SURGICAL  2016    Reduction of intussusception, sigmoid colon resection, and creation of end colostomy and Osito's pouch to treat colonic obstruction secondary to colorectal intussusception of a sigmoid colon lipoma; Dr. Bob Monet. Family History   Problem Relation Age of Onset    Cancer Sister      pancreatic    Anesth Problems Neg Hx       History reviewed, no pertinent family history.   Social History   Substance Use Topics    Smoking status: Never Smoker    Smokeless tobacco: Never Used    Alcohol use No     No Known Allergies   Current Facility-Administered Medications   Medication Dose Route Frequency    famotidine (PEPCID) tablet 20 mg  20 mg Oral BID    oxyCODONE IR (ROXICODONE) tablet 2.5 mg  2.5 mg Oral Q12H    sodium chloride (NS) flush 5-10 mL  5-10 mL IntraVENous Q8H    sodium chloride (NS) flush 5-10 mL  5-10 mL IntraVENous PRN    naloxone (NARCAN) injection 0.4 mg  0.4 mg IntraVENous PRN    calcium-vitamin D (OS-SERINA) 500 mg-200 unit tablet  1 Tab Oral TID WITH MEALS    polyethylene glycol (MIRALAX) packet 17 g  17 g Oral DAILY    bisacodyl (DULCOLAX) suppository 10 mg  10 mg Rectal DAILY PRN    enoxaparin ++ partial dose++ 20 mg  20 mg SubCUTAneous DAILY    sodium chloride (NS) flush 5-10 mL 5-10 mL IntraVENous PRN    dextrose 5 % - 0.45% NaCl infusion  100 mL/hr IntraVENous PRN    donepezil (ARICEPT) tablet 10 mg  10 mg Oral QHS    sodium chloride (NS) flush 5-10 mL  5-10 mL IntraVENous PRN    acetaminophen (TYLENOL) tablet 650 mg  650 mg Oral Q6H    ondansetron (ZOFRAN) injection 4 mg  4 mg IntraVENous Q4H PRN    docusate sodium (COLACE) capsule 100 mg  100 mg Oral BID    0.9% sodium chloride infusion  75 mL/hr IntraVENous CONTINUOUS    sodium chloride (NS) flush 5-10 mL  5-10 mL IntraVENous Q8H    sodium chloride (NS) flush 5-10 mL  5-10 mL IntraVENous PRN    oxyCODONE IR (ROXICODONE) tablet 2.5 mg  2.5 mg Oral Q4H PRN    oxyCODONE IR (ROXICODONE) tablet 5 mg  5 mg Oral Q4H PRN    naloxone (NARCAN) injection 0.4 mg  0.4 mg IntraVENous PRN    senna-docusate (PERICOLACE) 8.6-50 mg per tablet 2 Tab  2 Tab Oral BID          LAB AND IMAGING FINDINGS:     Lab Results   Component Value Date/Time    WBC 8.7 07/29/2017 04:57 AM    HGB 9.6 07/29/2017 04:57 AM    PLATELET 424 67/69/6447 04:57 AM     Lab Results   Component Value Date/Time    Sodium 137 07/31/2017 09:36 AM    Potassium 3.8 07/31/2017 09:36 AM    Chloride 100 07/31/2017 09:36 AM    CO2 35 07/31/2017 09:36 AM    BUN 9 07/31/2017 09:36 AM    Creatinine 0.44 07/31/2017 09:36 AM    Calcium 8.8 07/31/2017 09:36 AM    Magnesium 2.2 07/27/2017 03:59 AM    Phosphorus 2.4 07/27/2017 03:59 AM      Lab Results   Component Value Date/Time    AST (SGOT) 26 07/26/2017 03:12 PM    Alk.  phosphatase 111 07/26/2017 03:12 PM    Protein, total 7.1 07/26/2017 03:12 PM    Albumin 3.2 07/26/2017 03:12 PM    Globulin 3.9 07/26/2017 03:12 PM     Lab Results   Component Value Date/Time    INR 1.0 07/26/2017 04:22 PM    Prothrombin time 10.2 07/26/2017 04:22 PM    aPTT 26.0 01/05/2016 03:07 AM      Lab Results   Component Value Date/Time    Iron 34 04/18/2017 06:06 AM    Iron 35 04/18/2017 06:06 AM    TIBC 205 04/18/2017 06:06 AM    Iron % saturation 17 04/18/2017 06:06 AM    Ferritin 178 04/18/2017 06:06 AM      No results found for: PH, PCO2, PO2  No components found for: [place]   Lab Results   Component Value Date/Time     04/18/2017 06:06 AM    CK - MB 4.0 04/18/2017 06:06 AM                Total time: 35 min  Counseling / coordination time, spent as noted above: 20 min  > 50% counseling / coordination?: yes    Prolonged service was provided for  []30 min   []75 min in face to face time in the presence of the patient, spent as noted above. Time Start:   Time End:   Note: this can only be billed with 13431 (initial) or 51941 (follow up). If multiple start / stop times, list each separately.

## 2017-07-31 NOTE — PROGRESS NOTES
POD 3 Days Post-Op    Procedure:  Procedure(s) with comments:  FEMUR INTRA MEDULLARY NAIL LEFT - FEMUR INTRA MEDULLARY NAILING RETROGRADE,left synthes    Subjective:     No complaints. Awaiting snf and meeting with palliative care. Objective:     Blood pressure 137/79, pulse 86, temperature 98.4 °F (36.9 °C), resp. rate 16, height 5' 4\" (1.626 m), weight 40.8 kg (90 lb), SpO2 98 %. Temp (24hrs), Av.9 °F (36.6 °C), Min:97 °F (36.1 °C), Max:98.4 °F (36.9 °C)      Physical Exam:   Incision remains clean, dry and intact. Sensation is intact to light touch.   NVSI    Labs:   Lab Results   Component Value Date/Time    HGB 9.6 2017 04:57 AM         Assessment:     Principal Problem:    Femur fracture ([address] (2017)        Procedure(s) with comments:  FEMUR INTRA MEDULLARY NAIL LEFT - FEMUR INTRA MEDULLARY NAILING RETROGRADE,left synthes    Plan/Recommendations/Medical Decision Making:     - pain control   - ice   - nwb LLE, currently bed bound  - dvt prophylaxis - lovenox  - d/c planning - plan to be made after meeting with palliative care          Mian Story DO

## 2017-07-31 NOTE — ROUTINE PROCESS
Bedside and Verbal shift change report given to Armando  (oncoming nurse) by Miguel Vasquez RN (offgoing nurse). Report included the following information SBAR, Kardex, OR Summary, Procedure Summary, Intake/Output, MAR, Accordion, Recent Results and Med Rec Status.

## 2017-07-31 NOTE — INTERDISCIPLINARY ROUNDS
Bedside interdisciplinary rounds were held today to discuss patient plan of care and outcomes. The following members were present: Nurse Practitioner, Nurse, Clinical Care Leader, Pharmacy, Physical Therapy, and Case Management. Plan:  NWB to RLE. Lovenox for DVT prevention. Referral likely to be sent to [facility] following family meeting with Palliative this afternoon.

## 2017-07-31 NOTE — PROGRESS NOTES
Problem: Self Care Deficits Care Plan (Adult)  Goal: *Acute Goals and Plan of Care (Insert Text)  Occupational Therapy Goals  Initiated 7/31/2017  1. Patient will perform self feeding with supervision/set-up within 7 day(s). 2. Patient will perform grooming via supported sitting with supervision/set-up within 7 day(s). 3. Patient will perform EOB static sitting balance in prep for ADLs with minimal assistance/contact guard assist within 7 day(s). 4. Patient will perform upper body bathing in supported sitting with moderate assistance within 7 day(s). OCCUPATIONAL THERAPY EVALUATION  Patient: Margret Reddy (80 y.o. female)  Date: 7/31/2017  Primary Diagnosis: Femur fracture (HCC)  Fracture left femur  Procedure(s) (LRB):  FEMUR INTRA MEDULLARY NAIL LEFT (Left) 4 Days Post-Op   Precautions:   DNR, Bed Alarm, Fall      ASSESSMENT :  Based on the objective data described below, the patient presents with dementia, grossly impaired ROM and strength in L LE (non sx hip fx 4/2017 with pt being bed bound since), poor static sitting balance, confusion and need for A for all ADLs at bed level. Pt cooperative this date, able to communicate with broken [place]. She required total A x 2 for bed mobility, poor sitting balance with strong posterior lean. Pt unable to assist with any trunk flexion despite manual cues, required A to be scooted backwards x 2 to avoid sliding off EOB. Once supine, assisted pt with self feeding, she required total A for container management, mod A for feeding due to confusion and time (however with encouragement, pt able to feed self with supervision/setup). Pt is a poor historian due to her dementia so unable to determine PLOF since April as pt has been at home and bed bound since non surgical repair of L hip fx. Recommend SNF rehab, noted palliative care following with family meeting scheduled. Patient will benefit from skilled intervention to address the above impairments.   Patients rehabilitation potential is considered to be Guarded  Factors which may influence rehabilitation potential include:   [ ]             None noted  [X]             Mental ability/status  [X]             Medical condition  [ ]             Home/family situation and support systems  [ ]             Safety awareness  [ ]             Pain tolerance/management  [ ]             Other:        PLAN :  Recommendations and Planned Interventions:  [ ]               Self Care Training                  [X]        Therapeutic Activities  [ ]               Functional Mobility Training    [ ]        Cognitive Retraining  [ ]               Therapeutic Exercises           [ ]        Endurance Activities  [ ]               Balance Training                   [ ]        Neuromuscular Re-Education  [ ]               Visual/Perceptual Training     [ ]   Home Safety Training  [ ]               Patient Education                 [X]        Family Training/Education  [ ]               Other (comment):     Frequency/Duration: Patient will be followed by occupational therapy 2 times a week to address goals. Discharge Recommendations: Michael Izaguirre  Further Equipment Recommendations for Discharge: TBD       SUBJECTIVE:   Patient stated georgette cespedes.       OBJECTIVE DATA SUMMARY:   HISTORY:   Past Medical History:   Diagnosis Date    Alzheimer's dementia       Diagnosed while in rehab recovering from colon resection.  Chronic pain       lower back    History of colonic polyps       Tubular adenoma excised colonoscopically on 2016.  Hypertension      Lipoma of colon       Resected on 2016. Past Surgical History:   Procedure Laterality Date    HX  SECTION        HX COLONOSCOPY   2016     Dr. Cata Washburn.     HX OTHER SURGICAL   2016     Reduction of intussusception, sigmoid colon resection, and creation of end colostomy and Osito's pouch to treat colonic obstruction secondary to colorectal intussusception of a sigmoid colon lipoma; Dr. Cata Washburn. Prior Level of Function/Home Situation: non surgical fx L hip, 4/2017 and bed bound since. Expanded or extensive additional review of patient history:      Home Situation  Home Environment: Private residence  One/Two Story Residence: One story  Living Alone: No  Support Systems: Family member(s)  Patient Expects to be Discharged to[de-identified] Unknown  Current DME Used/Available at Home: None  [X]  Right hand dominant             [ ]  Left hand dominant     EXAMINATION OF PERFORMANCE DEFICITS:  Cognitive/Behavioral Status:  Neurologic State: Confused  Orientation Level: Disoriented to situation;Disoriented to time;Oriented to person  Cognition: Decreased attention/concentration;Decreased command following; Follows commands              Skin: intact     Edema: none noted     Hearing: Auditory  Auditory Impairment: None  Hearing Aids/Status: Does not own     Vision/Perceptual:    Tracking: Able to track stimulus in all quadrants w/o difficulty                                 Range of Motion:     AROM: Generally decreased, functional (B UEs, grossly decreased B LEs)                          Strength:     Strength: Generally decreased, functional, B UEs                 Coordination:  Coordination: Generally decreased, functional  Fine Motor Skills-Upper: Left Intact; Right Intact    Gross Motor Skills-Upper: Left Intact; Right Intact     Tone & Sensation:                                Balance:  Sitting: Impaired; With support  Sitting - Static: Poor (constant support)     Functional Mobility and Transfers for ADLs:  Bed Mobility:  Supine to Sit: Total assistance;Assist x2; Additional time  Sit to Supine: Total assistance;Assist x2     Transfers:        ADL Assessment:  Feeding:  Moderate assistance (able to progress to supervision)     Oral Facial Hygiene/Grooming: Minimum assistance (supported sitting)     Bathing: Maximum assistance     Upper Body Dressing: Maximum assistance     Lower Body Dressing: Total assistance     Toileting: Total assistance                 ADL Intervention and task modifications:  Feeding  Container Management: Total assistance (dependent)  Cutting Food: Total assistance (dependent)  Utensil Management: Minimum assistance  Food to Mouth: Moderate assistance                 Functional Measure:  Barthel Index:      Bathin  Bladder: 0  Bowels: 0  Groomin  Dressin  Feedin  Mobility: 0  Stairs: 0  Toilet Use: 0  Transfer (Bed to Chair and Back): 0  Total: 5         Barthel and G-code impairment scale:  Percentage of impairment CH  0% CI  1-19% CJ  20-39% CK  40-59% CL  60-79% CM  80-99% CN  100%   Barthel Score 0-100 100 99-80 79-60 59-40 20-39 1-19    0   Barthel Score 0-20 20 17-19 13-16 9-12 5-8 1-4 0      The Barthel ADL Index: Guidelines  1. The index should be used as a record of what a patient does, not as a record of what a patient could do. 2. The main aim is to establish degree of independence from any help, physical or verbal, however minor and for whatever reason. 3. The need for supervision renders the patient not independent. 4. A patient's performance should be established using the best available evidence. Asking the patient, friends/relatives and nurses are the usual sources, but direct observation and common sense are also important. However direct testing is not needed. 5. Usually the patient's performance over the preceding 24-48 hours is important, but occasionally longer periods will be relevant. 6. Middle categories imply that the patient supplies over 50 per cent of the effort. 7. Use of aids to be independent is allowed. Mally Hernandez., Barthel, D.W. ([id number]). Functional evaluation: the Barthel Index. [address] [facility] (14)2. EAMON Guido, Pastora Lal., Cleo Stack., Kacie [place], [facility] ().  Measuring the change indisability after inpatient rehabilitation; comparison of the responsiveness of the Barthel Index and Functional [place] Measure. Journal of Neurology, Neurosurgery, and Psychiatry, 66(4), 253-364. WARD Garcia, LAZARO Iverson, & Yun Stafford M.A. (2004.) Assessment of post-stroke quality of life in cost-effectiveness studies: The usefulness of the Barthel Index and the EuroQoL-5D. Quality of Life Research, 13, 383-79            G codes: In compliance with CMSs Claims Based Outcome Reporting, the following G-code set was chosen for this patient based on their primary functional limitation being treated: The outcome measure chosen to determine the severity of the functional limitation was the barthel index with a score of 5/100 which was correlated with the impairment scale. · Self Care:               - CURRENT STATUS:    CM - 80%-99% impaired, limited or restricted               - GOAL STATUS:           CL - 60%-79% impaired, limited or restricted               - D/C STATUS:                       ---------------To be determined---------------      Occupational Therapy Evaluation Charge Determination   History Examination Decision-Making   LOW Complexity : Brief history review  HIGH Complexity : 5 or more performance deficits relating to physical, cognitive , or psychosocial skils that result in activity limitations and / or participation restrictions HIGH Complexity : Patient presents with comorbidities that affect occupational performance. Signifigant modification of tasks or assistance (eg, physical or verbal) with assessment (s) is necessary to enable patient to complete evaluation       Based on the above components, the patient evaluation is determined to be of the following complexity level: LOW   Pain:                    Activity Tolerance:   VSS  Please refer to the flowsheet for vital signs taken during this treatment.   After treatment:   [ ] Patient left in no apparent distress sitting up in chair  [X] Patient left in no apparent distress in bed  [X] Call bell left within reach  [X] Nursing notified  [ ] Caregiver present  [ ] Bed alarm activated      COMMUNICATION/EDUCATION:   The patients plan of care was discussed with: Physical Therapist, Registered Nurse and . [ ] Home safety education was provided and the patient/caregiver indicated understanding. [ ] Patient/family have participated as able in goal setting and plan of care. [ ] Patient/family agree to work toward stated goals and plan of care. [ ] Patient understands intent and goals of therapy, but is neutral about his/her participation. [X] Patient is unable to participate in goal setting and plan of care. This patients plan of care is appropriate for delegation to [facility].      Thank you for this referral.  Darian Mena OT  Time Calculation: 24 mins

## 2017-07-31 NOTE — PROGRESS NOTES
Problem: Mobility Impaired (Adult and Pediatric)  Goal: *Acute Goals and Plan of Care (Insert Text)  Physical Therapy Goals  Initiated 7/31/2017  1. Patient will move from supine to sit and sit to supine , scoot up and down and roll side to side in bed with moderate assistance within 7 day(s). 3. Patient will maintain seated position EOB with moderate assistance within 7 day(s). 4. Patient will participate in LE/UE strengthening program while supine in bed with supervision/set-up within 7 day(s). PHYSICAL THERAPY EVALUATION  Patient: Raudel Bhat (80 y.o. female)  Date: 7/31/2017  Primary Diagnosis: Femur fracture (HCC)  Fracture left femur  Procedure(s) (LRB):  FEMUR INTRA MEDULLARY NAIL LEFT (Left) 4 Days Post-Op   Precautions:   DNR, Bed Alarm, Fall      ASSESSMENT :  Based on the objective data described below, the patient presents with impaired functional mobility secondary to baseline dementia, grossly decreased strength and ROM, poor sitting balance, and increased pain levels. Pt received supine in bed, alert and cooperative, and communicating with broken English. Pt required totalAx2 throughout all aspects of mobility including bed mobility and static seated position EOB. Pt extremely rigid as well as resistant to all aspects of mobility. Sitting balance POOR EOB despite totalA with pt exhibiting strong posterior lean/strong trunk extension which placed pt as increased risk for sliding off edge of bed. Given poor sitting balance EOB w/ pt requiring totalA, further mobility deemed unsafe and pt returned to supine position in bed w/ totalAx2. Noted that palliative care is meeting with family to discuss goals of care. Discharge recommendations TBD however pt would likely benefit from SNF prior to returning home in order to decrease caregiver burden. Patient will benefit from skilled intervention to address the above impairments.   Patients rehabilitation potential is considered to be Fair  Factors which may influence rehabilitation potential include:   [ ]         None noted  [X]         Mental ability/status  [X]         Medical condition  [ ]         Home/family situation and support systems  [ ]         Safety awareness  [X]         Pain tolerance/management  [ ]         Other:        PLAN :  Recommendations and Planned Interventions:  [X]           Bed Mobility Training             [ ]    Neuromuscular Re-Education  [X]           Transfer Training                   [ ]    Orthotic/Prosthetic Training  [X]           Gait Training                         [ ]    Modalities  [X]           Therapeutic Exercises           [ ]    Edema Management/Control  [X]           Therapeutic Activities            [X]    Patient and Family Training/Education  [ ]           Other (comment):     Frequency/Duration: Patient will be followed by physical therapy  2 times a week to address goals. Discharge Recommendations: [facility]agu[facility]  Further Equipment Recommendations for Discharge: TBD by facility       SUBJECTIVE:   Patient stated Irene Violet.       OBJECTIVE DATA SUMMARY:   HISTORY:    Past Medical History:   Diagnosis Date    Alzheimer's dementia       Diagnosed while in rehab recovering from colon resection.  Chronic pain       lower back    History of colonic polyps       Tubular adenoma excised colonoscopically on 2016.  Hypertension      Lipoma of colon       Resected on 2016. Past Surgical History:   Procedure Laterality Date    HX  SECTION        HX COLONOSCOPY   2016     Dr. Crystal Fletcher.  HX OTHER SURGICAL   2016     Reduction of intussusception, sigmoid colon resection, and creation of end colostomy and Osito's pouch to treat colonic obstruction secondary to colorectal intussusception of a sigmoid colon lipoma; Dr. Crystal Fletcher.      Prior Level of Function/Home Situation: Unable to obtain social history/PLOF due to baseline dementia however per chart review, pt has been nonambulatory since 2017 and bed bound. Lives with son. Personal factors and/or comorbidities impacting plan of care:      Home Situation  Home Environment: Private residence  One/Two Story Residence: One story  Living Alone: No  Support Systems: Family member(s)  Patient Expects to be Discharged to[de-identified] Unknown  Current DME Used/Available at Home: None     EXAMINATION/PRESENTATION/DECISION MAKING:   Critical Behavior:  Neurologic State: Confused  Orientation Level: Disoriented to situation, Disoriented to time, Oriented to person  Cognition: Decreased attention/concentration, Decreased command following, Follows commands     Hearing: Auditory  Auditory Impairment: None  Hearing Aids/Status: Does not own  Skin:  Intact  Edema: none noted   Range Of Motion:  AROM: Generally decreased, functional (B UEs, grossly decreased B LEs)                       Strength:    Strength: Generally decreased, functional                    Tone & Sensation:                                  Coordination:  Coordination: Generally decreased, functional  Vision:   Tracking: Able to track stimulus in all quadrants w/o difficulty  Functional Mobility:  Bed Mobility:     Supine to Sit: Total assistance;Assist x2; Additional time  Sit to Supine: Total assistance;Assist x2     Transfers:                             Balance:   Sitting: Impaired; With support  Sitting - Static: Poor (constant support)  Ambulation/Gait Training:                                                                 Ambulation did not occur.      Functional Measure:  Barthel Index:      Bathin  Bladder: 0  Bowels: 0  Groomin  Dressin  Feedin  Mobility: 0  Stairs: 0  Toilet Use: 0  Transfer (Bed to Chair and Back): 0  Total: 5         Barthel and G-code impairment scale:  Percentage of impairment CH  0% CI  1-19% CJ  20-39% CK  40-59% CL  60-79% CM  80-99% CN  100%   Barthel Score 0-100 100 99-80 79-60 59-40 20-39 1-19    0 Barthel Score 0-20 20 17-19 13-16 9-12 5-8 1-4 0      The Barthel ADL Index: Guidelines  1. The index should be used as a record of what a patient does, not as a record of what a patient could do. 2. The main aim is to establish degree of independence from any help, physical or verbal, however minor and for whatever reason. 3. The need for supervision renders the patient not independent. 4. A patient's performance should be established using the best available evidence. Asking the patient, friends/relatives and nurses are the usual sources, but direct observation and common sense are also important. However direct testing is not needed. 5. Usually the patient's performance over the preceding 24-48 hours is important, but occasionally longer periods will be relevant. 6. Middle categories imply that the patient supplies over 50 per cent of the effort. 7. Use of aids to be independent is allowed. Carmina Paez., Barthel, D.W. ([id number]). Functional evaluation: the Barthel Index. [address] [facility] (14)2. EAMON Avila, Divya Byrne., [facility], [address] [place] [address] (1999). Measuring the change indisability after inpatient rehabilitation; comparison of the responsiveness of the Barthel Index and Functional [place] Measure. Journal of Neurology, Neurosurgery, and Psychiatry, 66(4), 826-374. Geoffrey Orta NFernandoJ.A, LAZARO Iverson, & Jr Blackmon, M.A. (2004.) Assessment of post-stroke quality of life in cost-effectiveness studies: The usefulness of the Barthel Index and the EuroQoL-5D. Quality of Life Research, 13, 585-03         G codes: In compliance with CMSs Claims Based Outcome Reporting, the following G-code set was chosen for this patient based on their primary functional limitation being treated: The outcome measure chosen to determine the severity of the functional limitation was the Barthel Index with a score of 5/100 which was correlated with the impairment scale.       · Mobility - Walking and Moving Around:               - CURRENT STATUS:    CM - 80%-99% impaired, limited or restricted               - GOAL STATUS:           CL - 60%-79% impaired, limited or restricted               - D/C STATUS:                       ---------------To be determined---------------      Physical Therapy Evaluation Charge Determination   History Examination Presentation Decision-Making   MEDIUM  Complexity : 1-2 comorbidities / personal factors will impact the outcome/ POC  MEDIUM Complexity : 3 Standardized tests and measures addressing body structure, function, activity limitation and / or participation in recreation  MEDIUM Complexity : Evolving with changing characteristics  MEDIUM Complexity : FOTO score of 26-74      Based on the above components, the patient evaluation is determined to be of the following complexity level: MEDIUM     Pain:                    Activity Tolerance:   VSS throughout on RA  Please refer to the flowsheet for vital signs taken during this treatment. After treatment:   [ ]         Patient left in no apparent distress sitting up in chair  [X]         Patient left in no apparent distress in bed  [X]         Call bell left within reach  [X]         Nursing notified  [ ]         Caregiver present  [X]         Bed alarm activated      COMMUNICATION/EDUCATION:   The patients plan of care was discussed with: Occupational Therapist, Registered Nurse and . [X]         Fall prevention education was provided and the patient/caregiver indicated understanding. [X]         Patient/family have participated as able in goal setting and plan of care. [X]         Patient/family agree to work toward stated goals and plan of care. [ ]         Patient understands intent and goals of therapy, but is neutral about his/her participation. [ ]         Patient is unable to participate in goal setting and plan of care.      Thank you for this referral.  Shanel Gomes, PT, DPT   Time Calculation: 19 mins

## 2017-07-31 NOTE — PROGRESS NOTES
Ortho/ NeuroSurgery NP Note    POD# 4  s/p FEMUR INTRA MEDULLARY NAIL LEFT     Pt resting in bed. No complaints. Dementia at baseline. VSS Afebrile. Don removed. Incontinent of urine. Labs  Lab Results   Component Value Date/Time    HGB 9.6 07/29/2017 04:57 AM        Body mass index is 15.45 kg/(m^2). BMI greater than 30 is classified as obesity and greater than 40 is classified as morbid obesity. Dressing c.d.i, cryotherapy  Calves soft and supple; No pain with passive stretch  Sensation and motor intact  SCDs for mechanical DVT proph while in bed     PLAN:  1) PT BID  2) Lovenox for DVT Prophylaxis  3) Pepcid for GI Prophylaxis. 4) Plan d/c to SNF. Orthopedically stable for discharge.      Rosalva Henderson NP

## 2017-07-31 NOTE — PROGRESS NOTES
Cm spoke with pt daughter Flako Baxter regarding pt transitional care. Pt had previous SNF admissions with [facility] however pt doesn't want to send pt back there. Pt family selected [facility] from SNF list.Family has meeting scheduled with palliative care at 1 PM. Cm will wait send SNF referral after todays  palliate care meeting with pt and family.   Carlos Schmidt  Ext [phone]

## 2017-07-31 NOTE — PROGRESS NOTES
Hospitalist Progress Note    NAME: Isa Olmos   :  1935   MRN:  [id number]       Assessment / Plan:  Acute fracture of the left mid shaft femur  Healing left hip fracture  -s/p L retrograde intramedullary nailing  -pain and DVT prophylaxis as per ortho team, has not required any narcotics overnight. Pain appears to be well controlled with just tylenol  -cont' bowel regimen  -monitor closely for delirium, would limit narcotic use  -PT/OT/CM, pt is NWB LLE, has been bed bound for months as per family  -discharge after meeting with palliative to help with decision care    Aspiration risk  -consult SLP (can be done after surgery)  -aspiration precaution    Hypernatremia, resolved  -due to dehydration with poor po intake  -cont' IVF for now  -monitor lab    Hypokalemia  -repleted, monitor    UTI  -urine cx showed gram neg mikie  -completed empiric rocephin     Alzheimer dementia  -high risk for delirium,   -limit narcotic use if possible/avoid benzos  -cont' aricept  - palliative care team to help with care decisions, meeting with rakesh monday     Left LE DVT 2017   -S/p IVC  -not on anticoagulation due to high risk of falls      Severe protein calory malnutrition  -albumin 1.9   -nutritional consult   -added ensure         Code Status: DNR   Surrogate Decision Maker: son      DVT Prophylaxis: will be per ortho per protocol   GI Prophylaxis: not indicated     Baseline:lives with family; bed bound since April s/p fall; dementia; lives with son at home      Subjective:     Chief Complaint / Reason for Physician Visit  Pt is in bed, awake. No acute complaints. Discussed with RN events overnight.      Review of Systems:  Symptom Y/N Comments  Symptom Y/N Comments   Fever/Chills    Chest Pain     Poor Appetite    Edema     Cough    Abdominal Pain     Sputum    Joint Pain     SOB/JOE    Pruritis/Rash     Nausea/vomit    Tolerating PT/OT     Diarrhea    Tolerating Diet     Constipation    Other Could NOT obtain due to: dementia     Objective:     VITALS:   Last 24hrs VS reviewed since prior progress note. Most recent are:  Patient Vitals for the past 24 hrs:   Temp Pulse Resp BP SpO2   07/31/17 0740 98 °F (36.7 °C) 80 18 135/77 96 %   07/31/17 0027 98.4 °F (36.9 °C) 83 16 135/80 97 %   07/30/17 2015 98.4 °F (36.9 °C) 86 16 137/79 98 %     No intake or output data in the 24 hours ending 07/31/17 0903     PHYSICAL EXAM:  General: Thin female, cooperative, no acute distress    EENT:  EOMI. Anicteric sclerae. MMM  Resp:  CTA bilaterally, no wheezing or rales. No accessory muscle use  CV:  Regular  rhythm,  No edema  GI:  Soft, Non distended, Non tender.  +Bowel sounds  Neurologic:  Alert and oriented X 1, normal speech  Psych:   Unable to do but does not appear to be anxious or agitated  Skin:  No rashes. No jaundice    Reviewed most current lab test results and cultures  YES  Reviewed most current radiology test results   YES  Review and summation of old records today    NO  Reviewed patient's current orders and MAR    YES  PMH/ reviewed - no change compared to H&P  ________________________________________________________________________  Care Plan discussed with:    Comments   Patient x    Family      RN x    Care Manager     Consultant                        Multidiciplinary team rounds were held today with , nursing, pharmacist and clinical coordinator. Patient's plan of care was discussed; medications were reviewed and discharge planning was addressed.      ________________________________________________________________________  Total NON critical care TIME:  35   Minutes    Total CRITICAL CARE TIME Spent:   Minutes non procedure based      Comments   >50% of visit spent in counseling and coordination of care     ________________________________________________________________________  Katia Plunkett MD     Procedures: see electronic medical records for all procedures/Xrays and details which were not copied into this note but were reviewed prior to creation of Plan. LABS:  I reviewed today's most current labs and imaging studies.   Pertinent labs include:  Recent Labs      07/29/17 0457   WBC  8.7   HGB  9.6*   HCT  28.9*   PLT  143*     Recent Labs      07/29/17 0457   NA  141   K  3.9   CL  108   CO2  28   GLU  96   BUN  14   CREA  0.32*   CA  8.8       Signed: Jack Shrestha MD

## 2017-08-01 PROBLEM — N39.0 UTI (URINARY TRACT INFECTION): Status: ACTIVE | Noted: 2017-01-01

## 2017-08-01 NOTE — PROGRESS NOTES
1708 Discharge instructions discussed with kishor. IV removed. Belongings with nurse. Robbie Mosqueda 1 Patient discharged to front entrance via stretcher by transporters. Called patient's niece to inform them that the ambulance was on the way, inquired about how many steps are in the front entrance.

## 2017-08-01 NOTE — ROUTINE PROCESS
Bedside shift change report given to *** (oncoming nurse) by *** (offgoing nurse). Report included the following information SBAR, Kardex, OR Summary, Procedure Summary, Intake/Output, MAR, Accordion, Recent Results and Med Rec Status.

## 2017-08-01 NOTE — PROGRESS NOTES
Nutrition Assessment:    INTERVENTIONS/RECOMMENDATIONS:   Meals/Snacks: General/healthful diet:  Continue dysphagia 2 diet as tolerated, per SLP. Supplements: Commercial supplement:  Continue Ensure as ordered. ASSESSMENT:   7/31:  Chart reviewed; s/p POD#4. Pt with a [facility] soft diet ordered + Ensure shakes. Palliative care meeting today. Pt remains pleasantly confused. 7/27:  Chart reviewed, medically noted for fracture of the left mid shaft femur, past hip fracture from April, Alzheimer dementia, severe kcal and protein malnutrition and PMH provided below. No family at bedside this morning and pt noted for poor historian. Unable to obtain nutrition history however chart review shows ~13% wt loss over the past 3 months since her hip fracture in April. Printed material on strategies and recipes to increase kcal and protein intake were left in room. Will return once family is present to go over material and get better idea of pts eating patterns PTA. Diet Order:  (NDD2)  % Eaten:  Patient Vitals for the past 72 hrs:   % Diet Eaten   07/29/17 0829 70 %     Pertinent Medications: [x] Reviewed []Other:  Pertinent Labs: [x]Reviewed  []Other:  Food Allergies: [x]None []Other:     Last BM:    [x]Active     []Hyperactive  []Hypoactive       [] Absent  BS  Skin:    [] Intact   [x] Incision  [] Breakdown   []Edema   []Other:    Anthropometrics: Height: 5' 4\" (162.6 cm) Weight: 40.8 kg (90 lb)    IBW (%IBW):   ( ) UBW (%UBW):   (  %)    BMI: Body mass index is 15.45 kg/(m^2).     This BMI is indicative of:  []Underweight   []Normal   []Overweight   [] Obesity   [] Extreme Obesity (BMI>40)  Last Weight Metrics:  Weight Loss Metrics 7/27/2017 4/21/2017 1/19/2017 6/16/2016 6/14/2016 6/9/2016 4/20/2016   Today's Wt 90 lb 103 lb 13.4 oz 101 lb 101 lb 13.6 oz - 101 lb 100 lb   BMI 15.45 kg/m2 20.28 kg/m2 19.73 kg/m2 - 18.62 kg/m2 18.47 kg/m2 19.53 kg/m2       Estimated Nutrition Needs (Based on): 1300 Kcals/day (MSJ: 245 x 1.5 due to low BMI and hip frx) , 60 g (1.5 g/kg) Protein  Carbohydrate: At Least 130 g/day  Fluids: 1300 mL/day    NUTRITION DIAGNOSES:   Problem:  Increased nutrient needs (protein ) Underweight    Etiology: related to hip fracture Alzheimer dementia   Signs/Symptoms: as evidenced by pt going for surgical fixation today (7/27) BMI of 15    NUTRITION INTERVENTIONS:  Meals/Snacks: General/healthful diet   Supplements: Commercial supplement              GOAL:   Consume >25% of meals and ONS in 2-4 days once diet advanced    NUTRITION MONITORING AND EVALUATION      Food/Nutrient Intake Outcomes:  Total energy intake  Physical Signs/Symptoms Outcomes: Weight/weight change, Electrolyte and renal profile, GI profile, Glucose profile, GI    Previous Goal Met:   [] Met              [x] Progressing Towards Goal              [] Not Progressing Towards Goal   Previous Recommendations:   [x] Implemented          [] Not Implemented          [] Not Applicable    LEARNING NEEDS (Diet, Food/Nutrient-Drug Interaction):    [x] None Identified   [] Identified and Education Provided/Documented   [] Identified and Pt declined/was not appropriate     Cultural, Confucianist, OR Ethnic Dietary Needs:    [x] None Identified   [] Identified and Addressed     [x] Interdisciplinary Care Plan Reviewed/Documented    [x] Discharge Planning:  Continue Dysphagia Diet as tolerated, Ensure shakes   [] Participated in Interdisciplinary Rounds    NUTRITION RISK:    [x] High              [] Moderate           []  Low  []  Minimal/Uncompromised      Tone Garza, [address]  Pager [phone]  Weekend Pager [phone]

## 2017-08-01 NOTE — PROGRESS NOTES
Ortho/ NeuroSurgery NP Note    POD# 4  s/p FEMUR INTRA MEDULLARY NAIL LEFT     Pt resting in bed. No complaints. Dementia at baseline. Speaks english and Romanian. Responds primarily in [place]  VSS Afebrile. Don removed. Incontinent of urine. Labs  Lab Results   Component Value Date/Time    HGB 9.6 07/29/2017 04:57 AM        Body mass index is 15.45 kg/(m^2). BMI greater than 30 is classified as obesity and greater than 40 is classified as morbid obesity. Dressing c.d.i, cryotherapy  Calves soft and supple; No pain with passive stretch  Sensation and motor difficult to assess due to mentation. Does not follow commands  SCDs for mechanical DVT proph while in bed     PLAN:  1) PT BID, NWB  2) Lovenox 20 mg for DVT Prophylaxis  3) Pepcid for GI Prophylaxis. 4) Plan d/c to SNF - waiting on bed from [facility]. Orthopedically stable for discharge.      Adarsh Dallas NP

## 2017-08-01 NOTE — DISCHARGE INSTRUCTIONS
HOSPITALIST DISCHARGE INSTRUCTIONS    NAME: Lacey Mix   :  1935   MRN:  [id number]     Date/Time:  2017 8:04 AM    ADMIT DATE: 2017   DISCHARGE DATE: 2017     Attending Physician: Jeff Dickson MD    DISCHARGE DIAGNOSIS:  Left Femoral Fracture, Healing Left hip Fracture, UTI, Dementia, Left DVT, Hypernatremia, Severe Malnutrition      Medications: Per above medication reconciliation. Pain Management: per above medications    Recommended diet: Dysphagia, mechanical altered    Recommended activity: Activity as tolerated    Wound care: See surgical/procedure care instructions    Indwelling devices:  None    Supplemental Oxygen: None    Required Lab work: Per SNF routine    Glucose management:  None    Code status: DNR        Outside physician follow up: Follow-up Information     Follow up With Details Comments [address] Hortensia Melendez MD   [address]  [phone]        F/U PCP  F/U Orthopedics    Follow up appointments  Call [place] [address] at [phone] to schedule follow up appt with Dr. Shannan Ballard in  10 - 14 days.       ORTHOPAEDIC DISCHARGE INSTRUCTIONS    When to call your Orthopaedic Surgeon:  -unrelieved pain  -Signs of infection-if your incision is red; continues to have drainage; drainage has a foul odor or if you have a persistent fever over 101 degrees  -Signs of a blood clot in your leg-calf pain, tenderness, redness, swelling of lower leg  When to call your Primary Care Physician:  -Concerns about medical conditions such as diabetes, high blood pressure, asthma, congestive heart failure  -Call if blood sugars are elevated, persistent headache or dizziness, coughing or congestion, constipation or diarrhea, burning with urination, abnormal heart rate  When to call 911and go to the nearest emergency room  -acute onset of chest pain, shortness of breath, difficulty breathing    Activity - may weight bear as tolerated on Right leg, use hip precautions    Incision Care - keep dressing clean and dry, change daily, may remove and shower in 3 days    Preventing blood clots - Lovenox 20 mg daily for 21 days    Pain management  -take pain medication as prescribed; decrease the amount you use as your pain lessens  -avoid alcoholic beverages while taking pain medication  -Please be aware that many medications contain Tylenol. We do not want you to over medicate so please read the information below as a guide. Do not take more than 4 Grams of Tylenol in a 24 hour period. (There are 1000 milligrams in one Gram)  Percocet contains 325 mg of Tylenol per tablet (do not take more than 12 tablets in 24 hours)  Lortab contains 500 mg of Tylenol per tablet (do not take more than 8 tablets in 24 hours)  Norco contains 325 mg of Tylenol per tablet (do not take more than 12 tablets in 24 hours). -You may place an ice bag on your affected extremity     Diet  -resume usual diet; drink plenty of fluids; eat foods high in fiber  -you may want to take a stool softener (such as Senokot-S or Colace) to prevent constipation while you are taking pain medication. If constipation occurs, take a laxative (such as Dulcolax tablets, Milk of Magnesia, or a suppository)      Physical Therapy-per physicians order  Weight bearing status: Non weight bearing on LLE       Physical Therapy  -assessment and evaluation-bed mobility; functional transfers (bed, chair, bathroom, stairs); ambulation with equipment, safety and ability to get out of house in the event of an emergency  -review and maintain weight bearing status  -discuss pain management  -review how to do ADLs       Skilled nursing facility/ SNF MD responsible for above on discharge. Information obtained by :  I understand that if any problems occur once I am at home I am to contact my physician. I understand and acknowledge receipt of the instructions indicated above. Physician's or R.N.'s Signature                                                                  Date/Time                                                                                                                                              Patient or Repres

## 2017-08-01 NOTE — PROGRESS NOTES
Palliative Medicine Consult  Kenn: [phone] ([phone])    Patient Name: Jacqueline Carney  YOB: 1935    Date of Initial Consult: 7/28/2017  Reason for Consult: Care  decisions  Requesting Provider: Dr. Margaret Ferreira  Primary Care Physician: Shi Monsalve MD      SUMMARY:   Jacqueline Carney is a 80 y.o. with a past history of alzheimer's dementia, chronic back pain, colon polyps, lipoma of colon, left leg DVT 4/2017 4/2017 after left hip fracture s/p IVC filter,  who was admitted on 7/26/2017 from home with a diagnosis of left femur fracture after falling out of bed. She has been living with family. Niece and nephew primary caregivers. They tell us that she has been bedridden since fracture in April. She went to rehab at [facility] and did not participate in physical therapy. Current medical issues leading to Palliative Medicine involvement include: left femur pain, weakness, debility, hyponatremia, FTT, hypovolemia, UTI and hypoalbuminemia. Psychosocial: Lives with niece and nephew Annie Pradhan and Landencachorro Lio. In April had left hip fracture and had a left leg DVT. Has been bed bound since. PALLIATIVE DIAGNOSES:   1. Debility  2. Alzheimer's Dementia  3. Recent hip fracture in April now presents with left femur fracture  4. Bed ridden  5. Weakness  6. FTT       PLAN:   1. Patient confused and unable to participate in conversation, pleasant. Niece to come in this afternoon with signed DDNR from patient's biological nephew and only NOK. 2. Patient to be discharged to [facility] this afternoon. 3. I called niece in-law NOK and care taker- Brittney Lio- [phone]. She still plans to come in today at 0 PM.  Shefali Siddiqui NP from out team is planning on meeting with her. She does not have any questions. Patient day +5 s/p ORIF.    4. Tolerating low dose opioids for pain. Nurses are holding when she is sleeping. Held this AM dosage. She seems comfortable.  She is still receiving RTC Tylenol. Continue with very low does oxycodone 2.5 mg every 12 hours. 5. lete. 10. Niece still considering hospice after rehab.   7. I spoke to patient's nurse. 8. Patient is still pleasantly confused. She spoke to us in [facility]. She did not seem to be in any pain. SCD on right leg. Right leg bandaged. 9. Initial consult note routed to primary continuity provider  10. Communicated plan of care with: Palliative IDT       GOALS OF CARE / TREATMENT PREFERENCES:   [====Goals of Care====]  GOALS OF CARE:  Patient / health care proxy stated goals: To get her back to her baseline possibly to rehab or go home with hospice      TREATMENT PREFERENCES:   Code Status: DNR    Advance Care Planning:  Advance Care Planning 7/28/2017   Patient's Healthcare Decision Maker is: Legal Next of Collin 71   Primary Decision Maker Name [email or web address] [facility]/Linsey [facility]- niece and nephew   Primary Decision Maker Phone Number -   Primary Decision Maker Relationship to Patient Other relative   Confirm Advance Directive None   Patient Would Like to Complete Advance Directive Unable   Does the patient have other document types -       Other:    The palliative care team has discussed with patient / health care proxy about goals of care / treatment preferences for patient.  [====Goals of Care====]         HISTORY:     History obtained from: chart and niece     [address] [place] confused unable to articulate complaints as she was speaking [facility]. Appears comfortable    HPI/SUBJECTIVE:    The patient is:   [] Verbal and participatory  [] Non-participatory due to: alzheimer's dementia     Clinical Pain Assessment (nonverbal scale for severity on nonverbal patients):    Adult Non-Verbal Pain Assessment    Face  [x] 0   No particular expression or smile  [] 1   Occasional grimace, tearing, frowning, wrinkled forehead  [] 2   Frequent grimace, tearing, frowning, wrinkled forehead    Activity (movement)  [x] 0   Lying quietly, normal position  [] 1 Seeking attention through movement or slow, cautious movement  [] 2   Restless, excessive activity and/or withdrawal reflexes    Guarding  [x] 0   Lying quietly, no positioning of hands over areas of body  [] 1   Splinting areas of the body, tense  [] 2   Rigid, stiff    Physiology (vital signs)  [x] 0   Stable vital signs  [] 1   Change in any of the following: SBP > 20mm Hg; HR > 20/minute  [] 2   Change in any of the following: SBP > 30mm Hg; HR > 25/minute    Respiratory  [] 0   Baseline RR/SpO2, compliant with ventilator  [] 1   RR > 10 above baseline, or 5% drop SpO2, mild asynchrony with ventilator  [] 2   RR > 20 above baseline, or 10% drop SpO2, asynchrony with ventilator    Total Non-Verbal Pain Score: 0     FUNCTIONAL ASSESSMENT:     Palliative Performance Scale (PPS):  PPS: 40       PSYCHOSOCIAL/SPIRITUAL SCREENING:     Advance Care Planning:  Advance Care Planning 7/28/2017   Patient's Healthcare Decision Maker is: Legal Next of Collin 71   Primary Decision Maker Name KAMI CASTILLO/Linsey Castillo- niece and nephew   Primary Decision Maker Phone Number -   Primary Decision Maker Relationship to Patient Other relative   Confirm Advance Directive None   Patient Would Like to Complete Advance Directive Unable   Does the patient have other document types -        Any spiritual / Confucianist concerns:  [] Yes /  [x] No    Caregiver Burnout:  [] Yes /  [x] No /  [] No Caregiver Present      Anticipatory grief assessment:   [x] Normal  / [] Maladaptive       ESAS Anxiety: Anxiety: 0    ESAS Depression: Depression: 0        REVIEW OF SYSTEMS:     Positive and pertinent negative findings in ROS are noted above in HPI. The following systems were [x] reviewed / [] unable to be reviewed as noted in HPI  Other findings are noted below. Systems: constitutional, ears/nose/mouth/throat, respiratory, gastrointestinal, genitourinary, musculoskeletal, integumentary, neurologic, psychiatric, endocrine.  Positive findings noted below.  Modified ESAS Completed by: provider   Fatigue: 1 Drowsiness: 0   Depression: 0 Pain:  (non verbal score=0)   Anxiety: 0 Nausea: 0   Anorexia: 4 Dyspnea: 0     Constipation: No     Stool Occurrence(s): 1        PHYSICAL EXAM:     From RN flowsheet:  Wt Readings from Last 3 Encounters:   17 90 lb (40.8 kg)   17 103 lb 13.4 oz (47.1 kg)   17 101 lb (45.8 kg)     Blood pressure 121/62, pulse 87, temperature 98.7 °F (37.1 °C), resp. rate 17, height 5' 4\" (1.626 m), weight 90 lb (40.8 kg), SpO2 98 %. Pain Scale 1: FACES  Pain Intensity 1: 7     Pain Location 1: Leg  Pain Orientation 1: Left  Pain Description 1: Aching  Pain Intervention(s) 1: Medication (see MAR)  Last bowel movement, if known:     Constitutional: Elderly, frail, pleasantly confused woman lying in bed in NAD  Eyes: pupils equal, anicteric  ENMT: no nasal discharge, moist mucous membranes  Cardiovascular: regular rhythm, distal pulses intact  Respiratory: breathing not labored, symmetric  Gastrointestinal: soft non-tender, +bowel sounds  Musculoskeletal: no deformity, no tenderness to palpation, left leg is bandaged, right leg in SCDs  Skin: warm, dry  Neurologic: following somecommands, moving all extremities  Psychiatric: full affect, no hallucinations, speaking in [place] mostly, not sure if she understands what we are saying to her  Other:       HISTORY:     Principal Problem:    Femur fracture ([address] (2017)    Active Problems:    Hypertension (2016)      Dementia (2017)      UTI (urinary tract infection) (2017)      Past Medical History:   Diagnosis Date    Alzheimer's dementia     Diagnosed while in rehab recovering from colon resection.  Chronic pain     lower back    History of colonic polyps     Tubular adenoma excised colonoscopically on 2016.  Hypertension     Lipoma of colon     Resected on 2016.       Past Surgical History:   Procedure Laterality Date    HX  SECTION      HX COLONOSCOPY  4/20/2016    Dr. Veronica Corey.  HX OTHER SURGICAL  1/5/2016    Reduction of intussusception, sigmoid colon resection, and creation of end colostomy and Osito's pouch to treat colonic obstruction secondary to colorectal intussusception of a sigmoid colon lipoma; Dr. Veronica Corey. Family History   Problem Relation Age of Onset    Cancer Sister      pancreatic    Anesth Problems Neg Hx       History reviewed, no pertinent family history.   Social History   Substance Use Topics    Smoking status: Never Smoker    Smokeless tobacco: Never Used    Alcohol use No     No Known Allergies   Current Facility-Administered Medications   Medication Dose Route Frequency    famotidine (PEPCID) tablet 20 mg  20 mg Oral BID    oxyCODONE IR (ROXICODONE) tablet 2.5 mg  2.5 mg Oral Q12H    sodium chloride (NS) flush 5-10 mL  5-10 mL IntraVENous Q8H    sodium chloride (NS) flush 5-10 mL  5-10 mL IntraVENous PRN    naloxone (NARCAN) injection 0.4 mg  0.4 mg IntraVENous PRN    calcium-vitamin D (OS-SERINA) 500 mg-200 unit tablet  1 Tab Oral TID WITH MEALS    polyethylene glycol (MIRALAX) packet 17 g  17 g Oral DAILY    bisacodyl (DULCOLAX) suppository 10 mg  10 mg Rectal DAILY PRN    enoxaparin ++ partial dose++ 20 mg  20 mg SubCUTAneous DAILY    sodium chloride (NS) flush 5-10 mL  5-10 mL IntraVENous PRN    dextrose 5 % - 0.45% NaCl infusion  100 mL/hr IntraVENous PRN    donepezil (ARICEPT) tablet 10 mg  10 mg Oral QHS    sodium chloride (NS) flush 5-10 mL  5-10 mL IntraVENous PRN    acetaminophen (TYLENOL) tablet 650 mg  650 mg Oral Q6H    ondansetron (ZOFRAN) injection 4 mg  4 mg IntraVENous Q4H PRN    docusate sodium (COLACE) capsule 100 mg  100 mg Oral BID    0.9% sodium chloride infusion  75 mL/hr IntraVENous CONTINUOUS    sodium chloride (NS) flush 5-10 mL  5-10 mL IntraVENous Q8H    sodium chloride (NS) flush 5-10 mL  5-10 mL IntraVENous PRN    oxyCODONE IR (ROXICODONE) tablet 2.5 mg 2.5 mg Oral Q4H PRN    oxyCODONE IR (ROXICODONE) tablet 5 mg  5 mg Oral Q4H PRN    naloxone (NARCAN) injection 0.4 mg  0.4 mg IntraVENous PRN    senna-docusate (PERICOLACE) 8.6-50 mg per tablet 2 Tab  2 Tab Oral BID          LAB AND IMAGING FINDINGS:     Lab Results   Component Value Date/Time    WBC 8.7 07/29/2017 04:57 AM    HGB 9.6 07/29/2017 04:57 AM    PLATELET 992 01/40/3849 04:57 AM     Lab Results   Component Value Date/Time    Sodium 137 07/31/2017 09:36 AM    Potassium 3.8 07/31/2017 09:36 AM    Chloride 100 07/31/2017 09:36 AM    CO2 35 07/31/2017 09:36 AM    BUN 9 07/31/2017 09:36 AM    Creatinine 0.44 07/31/2017 09:36 AM    Calcium 8.8 07/31/2017 09:36 AM    Magnesium 2.2 07/27/2017 03:59 AM    Phosphorus 2.4 07/27/2017 03:59 AM      Lab Results   Component Value Date/Time    AST (SGOT) 26 07/26/2017 03:12 PM    Alk. phosphatase 111 07/26/2017 03:12 PM    Protein, total 7.1 07/26/2017 03:12 PM    Albumin 3.2 07/26/2017 03:12 PM    Globulin 3.9 07/26/2017 03:12 PM     Lab Results   Component Value Date/Time    INR 1.0 07/26/2017 04:22 PM    Prothrombin time 10.2 07/26/2017 04:22 PM    aPTT 26.0 01/05/2016 03:07 AM      Lab Results   Component Value Date/Time    Iron 34 04/18/2017 06:06 AM    Iron 35 04/18/2017 06:06 AM    TIBC 205 04/18/2017 06:06 AM    Iron % saturation 17 04/18/2017 06:06 AM    Ferritin 178 04/18/2017 06:06 AM      No results found for: PH, PCO2, PO2  No components found for: [place]   Lab Results   Component Value Date/Time     04/18/2017 06:06 AM    CK - MB 4.0 04/18/2017 06:06 AM                Total time: 35 min  Counseling / coordination time, spent as noted above: 20 min  > 50% counseling / coordination?: yes    Prolonged service was provided for  []30 min   []75 min in face to face time in the presence of the patient, spent as noted above. Time Start:   Time End:   Note: this can only be billed with 50774 (initial) or 66343 (follow up).   If multiple start / stop times, list each separately.

## 2017-08-01 NOTE — DISCHARGE SUMMARY
Hospitalist Discharge Summary     Patient ID:  Ko Bro  [id number]  80 y.o.  1935    PCP on record: Duarte Rubio MD    Admit date: 7/26/2017  Discharge date and time: 8/1/2017      DISCHARGE DIAGNOSIS:    Left Femoral Fracture, Healing Left hip Fracture, UTI, Dementia, Left DVT, Hypernatremia, Severe Malnutrition      CONSULTATIONS:  IP CONSULT TO ORTHOPEDIC SURGERY  IP CONSULT TO PALLIATIVE CARE - PROVIDER  IP CONSULT TO ANESTHESIOLOGY    Excerpted HPI from H&P of Dori Butts MD:  Annette Cage is a 80 y.o.  female who presents with above complaint. Pt was brought in by family after she sustained a fall at home. Pt has Alzheimer's dementia and cannot contribute to the history. History was obtained from family at bedside. Patient's nephew heard a \"thump\" and went to check on pt. Pt was found lying on the floor. She rolled out of her bed. No LOC was reported. No hitting head. EMS gave pt intranasal fentanyl en route with relief in pain. Pt is c/o left leg pain after her fall. Pt was admitted to [facility] in April with left hp fracture. She was seen by ortho at that time. Her fracture was considered to be subacute and was managed conservatively. She was also Dx with L LE DVT. Temporary IVC was placed. She was DC off anticoagulation due to high risk of falling. No other complaints was voiced by pt. Her MS is at baseline per family. Vs: 98.6 °F (37 °C) - 107 - 131/84 - 16 - 98% RA  We were asked to admit for work up and evaluation of the above problems. ______________________________________________________________________  DISCHARGE SUMMARY/HOSPITAL COURSE:  for full details see H&P, daily progress notes, labs, consult notes. Acute fracture of the left mid shaft femur  Healing left hip fracture  -s/p L retrograde intramedullary nailing  -pain and DVT prophylaxis as per ortho team, has not required any narcotics overnight.   Pain appears to be well controlled with just tylenol  -cont' bowel regimen  -monitor closely for delirium, would limit narcotic use  -PT/OT/CM, pt is NWB LLE, has been bed bound for months as per family  -discharge after meeting with palliative to help with decision care  Aspiration risk  -consult SLP (can be done after surgery)  -aspiration precaution  Hypernatremia, resolved  -due to dehydration with poor po intake  -cont' IVF for now  -monitor lab  Hypokalemia  -repleted, monitor  UTI: isolated E. Coli, received CTX, will D/c on Ceftin for 5 days  Alzheimer dementia  -high risk for delirium, sundowning  -limit narcotic use if possible/avoid benzos  -cont' aricept  - palliative care team to help with care decisions, meeting with rakesh Pena  Left LE DVT 04/2017   -S/p IVC  -not on anticoagulation due to high risk of falls    Severe protein calory malnutrition  -albumin 1.9   -nutritional consult   -added ensure   Code Status: DNR   Surrogate Decision Maker: son   DVT Prophylaxis: will be per ortho per protocol   GI Prophylaxis: not indicated   Baseline:lives with family; bed bound since April s/p fall; dementia; lives with son at home     D/c to SNF today and F/U with PCP and Orthopedics as outpatient  _______________________________________________________________________  Patient seen and examined by me on discharge day. Pertinent Findings:  Gen:    Not in distress  Chest: Coarse BS  CVS:   Regular rhythm. No edema  Abd:  Soft, not distended, not tender  Neuro:  Alert, GCS [id number]  _______________________________________________________________________  DISCHARGE MEDICATIONS:   Current Discharge Medication List      START taking these medications    Details   calcium-vitamin D (OYSTER SHELL) 500 mg(1,250mg) -200 unit per tablet Take 1 Tab by mouth three (3) times daily (with meals). Qty: 90 Tab, Refills: 0      docusate sodium (COLACE) 100 mg capsule Take 1 Cap by mouth two (2) times a day for 90 days.   Qty: 60 Cap, Refills: 0 enoxaparin (LOVENOX) 60 mg/0.6 mL injection 20 mg by SubCUTAneous route daily for 21 days. Qty: 4.2 mL, Refills: 0      famotidine (PEPCID) 20 mg tablet Take 1 Tab by mouth two (2) times a day. Qty: 60 Tab, Refills: 1      polyethylene glycol (MIRALAX) 17 gram packet Take 1 Packet by mouth daily. Qty: 30 Each, Refills: 1      cefUROXime (CEFTIN) 500 mg tablet Take 1 Tab by mouth two (2) times a day for 5 days. Qty: 10 Tab, Refills: 0      oxyCODONE IR (ROXICODONE) 5 mg immediate release tablet Take 0.5 Tabs by mouth every four (4) hours as needed. Max Daily Amount: 15 mg.  Qty: 30 Tab, Refills: 0         CONTINUE these medications which have CHANGED    Details   acetaminophen (TYLENOL) 325 mg tablet Take 2 Tabs by mouth every six (6) hours as needed for Pain. Qty: 40 Tab, Refills: 0         CONTINUE these medications which have NOT CHANGED    Details   donepezil (ARICEPT) 5 mg tablet Take 10 mg by mouth nightly. COD LIVER OIL/ZINC OXIDE (DESITIN EX) by Apply Externally route nightly. Patient's niece stated that she applies this to her lower back and buttocks for sores and itching. FOLIC ACID/MULTIVIT-MIN/LUTEIN (CENTRUM SILVER PO) Take 1 Tab by mouth nightly. STOP taking these medications       senna-docusate (PERICOLACE) 8.6-50 mg per tablet Comments:   Reason for Stopping:               My Recommended Diet, Activity, Wound Care, and follow-up labs are listed in the patient's Discharge Insturctions which I have personally completed and reviewed.     _______________________________________________________________________  DISPOSITION:    Home with Family:    Home with HH/PT/OT/RN:    SNF/LTC: y   JARRET:    OTHER:        Condition at Discharge:  Stable  _______________________________________________________________________  Follow up with:   PCP : Lenny Hampton MD  Follow-up Information     Follow up With Details [address], [place]   [address] [facility] [address]  [phone]        F/U PCP  F/U Orthopedics        Total time in minutes spent coordinating this discharge (includes going over instructions, follow-up, prescriptions, and preparing report for sign off to her PCP) :  35 minutes    Signed:  Perlita Ag MD

## 2017-11-28 ENCOUNTER — HOME CARE VISIT (OUTPATIENT)
Dept: HOSPICE | Facility: HOSPICE | Age: 82
End: 2017-11-28
Payer: MEDICARE

## 2022-02-18 NOTE — BRIEF OP NOTE
BRIEF OPERATIVE NOTE    Date of Procedure: 7/27/2017   Preoperative Diagnosis: Fracture left femur  Postoperative Diagnosis: Fracture left femur    Procedure(s): FEMUR INTRA MEDULLARY NAIL LEFT, retrograde  Surgeon(s) and Role:     * Cj Serrano DO - Primary         Assistant Staff:       Surgical Staff:  Circ-1: Stefania Marquez RN  Radiology Technician: Cate Guy  Scrub Tech-1: Radha Pinto  Scrub Tech-Relief: Leslie Henry  Scrub RN-Relief: Javier Valdez RN  Surg Asst-1: Henry Silveira  Surg Asst-Relief: Leslie Henry; Javier Valdez RN  Event Time In   Incision Start 1657   Incision Close 1823     Anesthesia: General   Estimated Blood Loss: 50cc  Specimens: * No specimens in log *   Findings: comminuted fem shaft fx   Complications: none  Implants:   Implant Name Type Inv.  Item Serial No.  Lot No. LRB No. Used Action   13MM TI CANNULATED RETROGRADE FEMORAL NAIL-EX/280MM   04.013.736S [employer] [place] 04.013.736S Left 1 Implanted   ENDCAP T40 0MM NL IM FEM EXT S --  - [id number]  ENDCAP T40 0MM NL IM FEM EXT S --  04.013.000S [employer] [place] 04.013.000S Left 1 Implanted   TI SPIRAL BLD 70MM-STER F/TI RETROGRADE FEMORAL NAILS-EX   04.013.046S [employer] [place] 04.013.046S Left 1 Implanted   SCR BNE LCK T25 F/IM NL 5X66MM --  - [id number]  SCR BNE LCK T25 F/IM NL 5X66MM --  04.005.556 [employer] [place] 04.005.556 Left 1 Implanted   SCR BNE LCK T25 F/IM NL 5X32MM --  - [id number]   SCR BNE LCK T25 F/IM NL 5X32MM --  [phone] [employer] [place] 04.005.522 Left 1 Implanted <-- Click to add NO pertinent Family History

## 2022-06-17 NOTE — PALLIATIVE CARE
Consult to hospice called. Received consult for palliative care. Patient in OR for surgery to repair femur. We will see tomorrow. Thank you for the consult.     Altagracia Austin, [facility], [address], [facility]  [phone]
